# Patient Record
Sex: MALE | Race: WHITE | Employment: UNEMPLOYED | URBAN - METROPOLITAN AREA
[De-identification: names, ages, dates, MRNs, and addresses within clinical notes are randomized per-mention and may not be internally consistent; named-entity substitution may affect disease eponyms.]

---

## 2018-06-04 ENCOUNTER — HOSPITAL ENCOUNTER (INPATIENT)
Age: 58
LOS: 9 days | Discharge: HOME HEALTH | DRG: 640 | End: 2018-06-13
Attending: INTERNAL MEDICINE | Admitting: INTERNAL MEDICINE
Payer: COMMERCIAL

## 2018-06-04 DIAGNOSIS — K70.11 ASCITES DUE TO ALCOHOLIC HEPATITIS: Primary | ICD-10-CM

## 2018-06-04 PROBLEM — E11.10 DKA (DIABETIC KETOACIDOSES): Status: ACTIVE | Noted: 2018-06-04

## 2018-06-04 LAB
ADMINISTERED INITIALS, ADMINIT: NORMAL
D50 ADMINISTERED, D50ADM: 0 ML
D50 ORDER, D50ORD: 0 ML
GLUCOSE, GLC: 337 MG/DL
HIGH TARGET, HITG: 180 MG/DL
INSULIN ADMINSTERED, INSADM: 5.5 UNITS/HOUR
INSULIN ORDER, INSORD: 5.5 UNITS/HOUR
LOW TARGET, LOT: 140 MG/DL
MINUTES UNTIL NEXT BG, NBG: 60 MIN
MULTIPLIER, MUL: 0.02
ORDER INITIALS, ORDINIT: NORMAL

## 2018-06-04 PROCEDURE — 74011250636 HC RX REV CODE- 250/636: Performed by: INTERNAL MEDICINE

## 2018-06-04 PROCEDURE — 74011000258 HC RX REV CODE- 258: Performed by: INTERNAL MEDICINE

## 2018-06-04 PROCEDURE — 74011636637 HC RX REV CODE- 636/637: Performed by: INTERNAL MEDICINE

## 2018-06-04 PROCEDURE — 65610000006 HC RM INTENSIVE CARE

## 2018-06-04 PROCEDURE — 82962 GLUCOSE BLOOD TEST: CPT

## 2018-06-04 RX ORDER — SODIUM CHLORIDE 9 MG/ML
125 INJECTION, SOLUTION INTRAVENOUS CONTINUOUS
Status: DISCONTINUED | OUTPATIENT
Start: 2018-06-04 | End: 2018-06-04

## 2018-06-04 RX ORDER — MAGNESIUM SULFATE 100 %
4 CRYSTALS MISCELLANEOUS AS NEEDED
Status: DISCONTINUED | OUTPATIENT
Start: 2018-06-04 | End: 2018-06-13 | Stop reason: HOSPADM

## 2018-06-04 RX ORDER — DEXTROSE 50 % IN WATER (D50W) INTRAVENOUS SYRINGE
25-50 AS NEEDED
Status: DISCONTINUED | OUTPATIENT
Start: 2018-06-04 | End: 2018-06-13 | Stop reason: HOSPADM

## 2018-06-04 RX ADMIN — SODIUM CHLORIDE 5.5 UNITS/HR: 900 INJECTION, SOLUTION INTRAVENOUS at 23:26

## 2018-06-04 RX ADMIN — SODIUM CHLORIDE 125 ML/HR: 900 INJECTION, SOLUTION INTRAVENOUS at 23:06

## 2018-06-04 NOTE — IP AVS SNAPSHOT
[address] [address] [place] Patient: Devin Davis MRN: [id number] :1960 About your hospitalization You were admitted on:  2018 You last received care in the:  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 Currently on waiting list for PCP in [place]. Discharge Orders None A check nathaniel indicates which time of day the medication should be taken. My Medications START taking these medications Instructions Each Dose to Equal  
 Morning Noon Evening Bedtime  
 folic acid 1 mg tablet Commonly known as:  [employer] Start taking on:  2018 Your last dose was: Your next dose is: Take 1 Tab by mouth daily. 1 mg  
    
   
   
   
  
 glipiZIDE 10 mg tablet Commonly known as:  Alyssa Shown Your last dose was: Your next dose is: Take 1 Tab by mouth Before breakfast and dinner. 10 mg  
    
   
   
   
  
 lactulose 10 gram/15 mL solution Commonly known as:  Checo Ollie Your last dose was: Your next dose is: Take 30 mL by mouth two (2) times a day. Indications: Hepatic Encephalopathy, hold it if he has more than 6 BMs a day 30 mL  
    
   
   
   
  
 levothyroxine 25 mcg tablet Commonly known as:  SYNTHROID Start taking on:  2018 Your last dose was: Your next dose is: Take 1 Tab by mouth Daily (before breakfast). 25 mcg  
    
   
   
   
  
 metFORMIN 500 mg tablet Commonly known as:  GLUCOPHAGE Your last dose was: Your next dose is: Take 1 Tab by mouth two (2) times daily (with meals). 500 mg  
    
   
   
   
  
 midodrine 5 mg tablet Commonly known as:  Candance Passe Your last dose was: Your next dose is: Take 1 Tab by mouth two (2) times daily (with meals) for 30 days. 5 mg  
    
   
   
   
  
 pantoprazole 40 mg tablet Commonly known as:  PROTONIX Your last dose was: Your next dose is: Take 1 Tab by mouth Before breakfast and dinner. 40 mg  
    
   
   
   
  
 rifAXIMin 550 mg tablet Commonly known as:  Jovani Patiño Your last dose was: Your next dose is: Take 1 Tab by mouth two (2) times a day. Indications: Hepatic Encephalopathy 550 mg SITagliptin 50 mg tablet Commonly known as:  Deepti Melena Start taking on:  6/14/2018 Your last dose was: Your next dose is: Take 1 Tab by mouth daily. 50 mg  
    
   
   
   
  
 spironolactone 25 mg tablet Commonly known as:  ALDACTONE Start taking on:  6/14/2018 Your last dose was: Your next dose is: Take 0.5 Tabs by mouth daily. 12.5 mg  
    
   
   
   
  
 therapeutic multivitamin tablet Commonly known as:  [facility] Start taking on:  6/14/2018 Your last dose was: Your next dose is: Take 1 Tab by mouth daily. 1 Tab  
    
   
   
   
  
 thiamine 100 mg tablet Commonly known as:  B-1 Start taking on:  6/14/2018 Your last dose was: Your next dose is: Take 1 Tab by mouth daily. 100 mg  
    
   
   
   
  
 traMADol 50 mg tablet Commonly known as:  ULTRAM  
   
Your last dose was: Your next dose is: Take 1 Tab by mouth two (2) times daily as needed. Max Daily Amount: 100 mg. Indications: Pain 50 mg Where to Get Your Medications Information on where to get these meds will be given to you by the nurse or doctor. ! Ask your nurse or doctor about these medications  
  folic acid 1 mg tablet  
 glipiZIDE 10 mg tablet  
 lactulose 10 gram/15 mL solution  
 levothyroxine 25 mcg tablet  
 metFORMIN 500 mg tablet  
 midodrine 5 mg tablet  
 pantoprazole 40 mg tablet  
 rifAXIMin 550 mg tablet SITagliptin 50 mg tablet  
 spironolactone 25 mg tablet  
 therapeutic multivitamin tablet  
 thiamine 100 mg tablet  
 traMADol 50 mg tablet Opioid Education Prescription Opioids: What You Need to Know: 
 
Prescription opioids can be used to help relieve moderate-to-severe pain and are often prescribed following a surgery or injury, or for certain health conditions. These medications can be an important part of treatment but also come with serious risks. Opioids are strong pain medicines. Examples include hydrocodone, oxycodone, fentanyl, and morphine. Heroin is an example of an illegal opioid. It is important to work with your health care provider to make sure you are getting the safest, most effective care. WHAT ARE THE RISKS AND SIDE EFFECTS OF OPIOID USE? Prescription opioids carry serious risks of addiction and overdose, especially with prolonged use. An opioid overdose, often marked by slow breathing, can cause sudden death. The use of prescription opioids can have a number of side effects as well, even when taken as directed. · Tolerance-meaning you might need to take more of a medication for the same pain relief · Physical dependence-meaning you have symptoms of withdrawal when the medication is stopped. Withdrawal symptoms can include nausea, sweating, chills, diarrhea, stomach cramps, and muscle aches.   Withdrawal can last up to several weeks, depending on which drug you took and how long you took it. · Increased sensitivity to pain · Constipation · Nausea, vomiting, and dry mouth · Sleepiness and dizziness · Confusion · Depression · Low levels of testosterone that can result in lower sex drive, energy, and strength · Itching and sweating RISKS ARE GREATER WITH:      
· History of drug misuse, substance use disorder, or overdose · Mental health conditions (such as depression or anxiety) · Sleep apnea · Older age (72 years or older) · Pregnancy Avoid alcohol while taking prescription opioids. Also, unless specifically advised by your health care provider, medications to avoid include: · Benzodiazepines (such as Xanax or Valium) · Muscle relaxants (such as Soma or Flexeril) · Hypnotics (such as Ambien or Lunesta) · Other prescription opioids KNOW YOUR OPTIONS Talk to your health care provider about ways to manage your pain that don't involve prescription opioids. Some of these options may actually work better and have fewer risks and side effects. Options may include: 
· Pain relievers such as acetaminophen, ibuprofen, and naproxen · Some medications that are also used for depression or seizures · Physical therapy and exercise · Counseling to help patients learn how to cope better with triggers of pain and stress. · Application of heat or cold compress · Massage therapy · Relaxation techniques Be Informed Make sure you know the name of your medication, how much and how often to take it, and its potential risks & side effects. IF YOU ARE PRESCRIBED OPIOIDS FOR PAIN: 
· Never take opioids in greater amounts or more often than prescribed. Remember the goal is not to be pain-free but to manage your pain at a tolerable level. · Follow up with your primary care provider to: · Work together to create a plan on how to manage your pain. · Talk about ways to help manage your pain that don't involve prescription opioids. · Talk about any and all concerns and side effects. · Help prevent misuse and abuse. · Never sell or share prescription opioids · Help prevent misuse and abuse. · Store prescription opioids in a secure place and out of reach of others (this may include visitors, children, friends, and family). · Safely dispose of unused/unwanted prescription opioids: Find your community drug take-back program or your pharmacy mail-back program, or flush them down the toilet, following guidance from the Food and Drug Administration (www.fda.gov/Drugs/ResourcesForYou). · Visit www.cdc.gov/drugoverdose to learn about the risks of opioid abuse and overdose. · If you believe you may be struggling with addiction, tell your health care provider and ask for guidance or call [employer] at 8-563-350-IXPX. Discharge Instructions Diabetic Ketoacidosis (DKA): Care Instructions Your Care Instructions Diabetic ketoacidosis (DKA) happens when the body does not have enough insulin and can't get the sugar it needs for energy. When the body can't use sugar for energy, it starts to use fat for energy. This process makes fatty acids called ketones. The ketones build up in the blood and change the chemical balance in your body. This problem can be very dangerous and needs to be treated. Without treatment, it can lead to a coma or death. DKA occurs most often in people with type 1 diabetes. But people with type 2 diabetes also can get it. DKA can be caused by many things. It can happen if you don't take enough insulin. It can also happen if you have an infection or illness like the flu. Sometimes it happens if you are very dehydrated. DKA can only be treated with insulin and fluids. These are often given in a vein (IV). Follow-up care is a key part of your treatment and safety. Be sure to make and go to all appointments, and call your doctor if you are having problems. It's also a good idea to know your test results and keep a list of the medicines you take. How can you care for yourself at home? To reduce your chance of ketoacidosis: 
Take your insulin and other diabetes medicines on time and in the right dose. If an infection caused your DKA and your doctor prescribed antibiotics, take them as directed. Do not stop taking them just because you feel better. You need to take the full course of antibiotics. Test your blood sugar before meals and at bedtime or as often as your doctor advises. This is the best way to know when your blood sugar is high so you can treat it early. Watching for symptoms is not as helpful. This is because you may not have symptoms until your blood sugar is very high. Or you may not notice them. Teach others at work and at home how to check your blood sugar. Make sure that someone else knows how do it in case you can't. Wear or carry medical identification at all times. This is very important in case you are too sick or injured to speak for yourself. Talk to your doctor about when you can start to exercise again. Eat regular meals that spread your calories and carbohydrate throughout the day. This will help keep your blood sugar steady. When you are sick: 
Take your insulin and diabetes medicines. This is important even if you are vomiting and having trouble eating or drinking. Your blood sugar may go up because you are sick. If you are eating less than normal, you may need to change your dose of insulin. Talk with your doctor about a plan when you are well. Then you will know what to do when you are sick. Drink extra fluids to prevent dehydration. These include water, broth, and sugar-free drinks.  If you don't drink enough, the insulin from your shot may not get into your blood. So your blood sugar may go up. Try to eat as you normally do, with a focus on healthy food choices. Check your blood sugar at least every 3 to 4 hours. Check it more often if it's rising fast. If your doctor has told you to take an extra insulin dose for high blood sugar levels (for example, above 240 mg/dL) be sure to take the right amount. If you're not sure how much to take, call your doctor. Check your temperature and pulse often. If your temperature goes up, call your doctor. You may be getting worse. If you take insulin, check your urine or blood for ketones, especially when you have high blood sugar (for example, above 240 mg/dL). Call your doctor if your ketone level is moderate or high. If you know your blood sugar is high, treat it before it gets worse. If you missed your usual dose of insulin or other diabetes medicine, take the missed dose or take the amount your doctor told you to take if this happens. If you and your doctor decide on a dose of extra-fast-acting insulin, give yourself the right dose. If you take insulin and your doctor has not told you how much fast-acting insulin to take based on your blood sugar level, call your doctor. Drink extra water or sugar-free drinks to prevent dehydration. Wait 30 minutes after you take extra insulin or missed medicines. Then check your blood sugar again. If symptoms of high blood sugar get worse or your blood sugar level keeps rising, call your doctor. If you start to feel sleepy or confused, call 911. When should you call for help? Call 911 anytime you think you may need emergency care. For example, call if: You passed out (lost consciousness). You are confused or cannot think clearly. Your blood sugar is very high or very low. Watch closely for changes in your health, and be sure to contact your doctor if: 
Your blood sugar stays outside the level your doctor set for you. You have any problems. Where can you learn more? Go to http://carissa-thierry.info/. Meghann Loud in the search box to learn more about \"Diabetic Ketoacidosis (DKA): Care Instructions. \" Current as of: March 13, 2017 Content Version: 11.4 © 1064-7708 [email or web address]. Care instructions adapted under license by [employer] (which disclaims liability or warranty for this information). If you have questions about a medical condition or this instruction, always ask your healthcare professional. [address] any warranty or liability for your use of this information. Gastrointestinal Bleeding: Care Instructions Your Care Instructions The digestive or gastrointestinal tract goes from the mouth to the anus. It is often called the GI tract. Bleeding can happen anywhere in the GI tract. It may be caused by an ulcer, an infection, or cancer. It may also be caused by medicines such as aspirin or ibuprofen. Light bleeding may not cause any symptoms at first. But if you continue to bleed for a while, you may feel very weak or tired. Sudden, heavy bleeding means you need to see a doctor right away. This kind of bleeding can be very dangerous. But it can usually be cured or controlled. The doctor may do some tests to find the cause of your bleeding. Follow-up care is a key part of your treatment and safety. Be sure to make and go to all appointments, and call your doctor if you are having problems. It's also a good idea to know your test results and keep a list of the medicines you take. How can you care for yourself at home? · Be safe with medicines. Take your medicines exactly as prescribed. Call your doctor if you think you are having a problem with your medicine. You will get more details on the specific medicines your doctor prescribes.  
· Do not take aspirin or other anti-inflammatory medicines, such as naproxen (Aleve) or ibuprofen (Advil, Motrin), without talking to your doctor first. Ask your doctor if it is okay to use acetaminophen (Tylenol). · Do not drink alcohol. · The bleeding may make you lose iron. So it's important to eat foods that have a lot of iron. These include red meat, shellfish, poultry, and eggs. They also include beans, raisins, whole-grain breads, and leafy green vegetables. If you want help planning meals, you can make an appointment with a dietitian. When should you call for help? Call 911 anytime you think you may need emergency care. For example, call if: 
? · You have sudden, severe belly pain. ? · You vomit blood or what looks like coffee grounds. ? · You passed out (lost consciousness). ? · Your stools are maroon or very bloody. ?Call your doctor now or seek immediate medical care if: 
? · You are dizzy or lightheaded, or you feel like you may faint. ? · Your stools are black and look like tar, or they have streaks of blood. ? · You have belly pain. ? · You vomit or have nausea. ? · You have trouble swallowing, or it hurts when you swallow. ? Watch closely for changes in your health, and be sure to contact your doctor if: 
? · You do not get better as expected. Where can you learn more? Go to http://carissa-thierry.info/. Enter H439 in the search box to learn more about \"Gastrointestinal Bleeding: Care Instructions. \" Current as of: March 20, 2017 Content Version: 11.4 © 3708-0462 [employer]. Care instructions adapted under license by [employer] (which disclaims liability or warranty for this information). If you have questions about a medical condition or this instruction, always ask your healthcare professional. [address] [address] any warranty or liability for your use of this information. MyChart Announcement  We are excited to announce that we are making your provider's discharge notes available to you in [facility]. You will see these notes when they are completed and signed by the physician that discharged you from your recent hospital stay. If you have any questions or concerns about any information you see in [facility], please call the Health Information Department where you were seen or reach out to your Primary Care Provider for more information about your plan of care. Introducing \[facility] [place] [facility]\"" & HEALTH SERVICES! [facility] introduces [facility] patient portal. Now you can access parts of your medical record, email your doctor's office, and request medication refills online. 1. In your internet browser, go to [email or web address] 2. Click on the First Time User? Click Here link in the Sign In box. You will see the New Member Sign Up page. 3. Enter your [facility] Access Code exactly as it appears below. You will not need to use this code after youve completed the sign-up process. If you do not sign up before the expiration date, you must request a new code. · [facility] Access Code: [email or web address] Expires: 9/6/2018 10:08 AM 
 
4. Enter the last four digits of your Social Security Number (xxxx) and Date of Birth (mm/dd/yyyy) as indicated and click Submit. You will be taken to the next sign-up page. 5. Create a [facility] ID. This will be your [facility] login ID and cannot be changed, so think of one that is secure and easy to remember. 6. Create a [facility] password. You can change your password at any time. 7. Enter your Password Reset Question and Answer. This can be used at a later time if you forget your password. 8. Enter your e-mail address. You will receive e-mail notification when new information is available in [address] 9. Click Sign Up. You can now view and download portions of your medical record. 10. Click the Download Summary menu link to download a portable copy of your medical information. If you have questions, please visit the Frequently Asked Questions section of the MyChart website. Remember, [employer] is NOT to be used for urgent needs. For medical emergencies, dial 911. Now available from your iPhone and Android! Introducing Leeroy Kim As a [employer] patient, I wanted to make you aware of our electronic visit tool called Leeroy Kim. [employer] 24/7 allows you to connect within minutes with a medical provider 24 hours a day, seven days a week via a mobile device or tablet or logging into a secure website from your computer. You can access Leeroy Kim from anywhere in the [place]. A virtual visit might be right for you when you have a simple condition and feel like you just dont want to get out of bed, or cant get away from work for an appointment, when your regular [employer] provider is not available (evenings, weekends or holidays), or when youre out of town and need minor care. Electronic visits cost only $49 and if the [employer] 24/7 provider determines a prescription is needed to treat your condition, one can be electronically transmitted to a nearby pharmacy*. Please take a moment to enroll today if you have not already done so. The enrollment process is free and takes just a few minutes. To enroll, please download the [employer] 24/7 hitesh to your tablet or phone, or visit [email or web address] to enroll on your computer. And, as an [address] [place] [address] patient with a [employer] account, the results of your visits will be scanned into your electronic medical record and your primary care provider will be able to view the scanned results. We urge you to continue to see your regular [employer] provider for your ongoing medical care.   And while your primary care provider may not be the one available when you seek a Leeroy Kim virtual visit, the peace of mind you get from getting a real diagnosis real time can be priceless. For more information on Leeroy Shawfin, view our Frequently Asked Questions (FAQs) at [email or web address]. Sincerely, 
 
Nico Whatley MD 
Chief Medical Officer Maria Del Rosario Parsons *:  certain medications cannot be prescribed via Leeroy Alexanderlove Unresulted tests-please follow up with your PCP on these results Procedure/Test Authorizing Provider ALBUMIN, FLUID Hector Torres MD  
 AMMONIA Alban Mendez MD  
 AMMONIA January-Dayan Ogoy V, PA-C  
 CBC W/O DIFF Ronda Wiley MD  
 CBC W/O DIFF Alban Mendez MD  
 CBC WITH AUTOMATED DIFF Alban Mendez MD  
 CBC WITH AUTOMATED DIFF Cedrick Damon MD  
 CBC WITH AUTOMATED DIFF Cedrick Damon MD  
 CELL COUNT AND DIFF, BODY FLUID Joshua Moreno MD  
 CREATININE, UR, Tala Bowens MD  
 CULTURE, BLOOD January-Jill Ogoy V, PA-C  
 CULTURE, BLOOD January-Jill Ogoy V, PA-C  
 DRUG SCREEN, URINE January-Jill Ogoy V, PA-C Laya Velázquez MD  
 GLUCOSE, RANDOM Cedrick Damon MD  
 [facility] Historical Provider  HEMOGLOBIN A1C Yvonne Garcia MD  
 HEPATITIS PANEL, ACUTE January-Jill Ogoy V, PA-C  
 HGB & HCT Alban Mendez MD  
 IRON Erle Harry, MD MAGNESIUM Vincent Booze, MD Dawayne Opitz, MD Dawayne Opitz, MD  
 METABOLIC PANEL, BASIC Marcelino Self MD  
 METABOLIC PANEL, BASIC Marcelino Self MD  
 METABOLIC PANEL, Domenico Zhao MD  
 METABOLIC PANEL, Paris Freeman MD  
 METABOLIC PANEL, Brandie Desai MD  
 METABOLIC PANEL, Brandie Desai MD  
 METABOLIC PANEL, COMPREHENSIVE January-Dayan Ogoy V, PA-C  
 METABOLIC PANEL, Chandrika Melara MD  
 MRSA SCREEN - PCR (NASAL) January-Jill Ogoy V, PA-C  
 OSMOLALITY, SERUM/PLASMA Virgil Guzman, MD  
 OSMOLALITY, UR Virgil Guzman, MD Do Haji, MD Do Haji, MD Do Haji, MD Do Haji, MD Do Haji, MD Do Haji, MD Do Haji, MD Do Haji, MD Do Haji, MD  
 POTASSIUM, UR, Shira Tejada MD  
 PROTEIN TOTAL, FLUID Alfonso Martin MD  
 PROTHROMBIN TIME + INR Eloise Whiteside MD  
 PROTHROMBIN TIME + INR January-Jill Ogoy V, PA-C  
 PROTHROMBIN TIME + INR Jennie Veary, PA-C  
 PROTHROMBIN TIME + INR Mitchell Pack, PA-C  
 PTT January-Jill Ogoy V, PA-C  
 SODIUM, UR, Shira Tejada MD  
 T4, FREE January-Jill Ogoy V, PA-C  
 TSH Guillermina Acosta MD  
  ABD LTD Canda Net, PA-C  
 MD Margaret Solorio MD  
 XR CHEST PA LAT Scott Valenzuela MD  
  
Providers Seen During Your Hospitalization Provider Specialty Primary office phone Cristina Schumacher MD Internal Medicine [phone] Eloise Whiteside MD Infectious Diseases [phone] Rafael Dee MD Internal Medicine [phone] Scott Valenzuela MD Internal Medicine [phone] Your Primary Care Physician (PCP) Primary Care Physician Office Phone Office Fax NONE ** None ** ** None ** You are allergic to the following Allergen Reactions Bee Venom Protein (Honey Bee) Itching Recent Documentation Height Weight BMI  
  
  
 1.803 m 91.7 kg 28.2 kg/m2 Emergency Contacts Name Discharge Info [facility] [3[facility] Sister [23] [phone] Patient Belongings The following personal items are in your possession at time of discharge: 
  Dental Appliances: None  Visual Aid: None      Home Medications: None   Jewelry: None  Clothing: Footwear, Pants, Shirt, Socks, Undergarments    Other Valuables: None  Personal Items Sent to Safe:  (NONE) Please provide this summary of care documentation to your next provider. Signatures-by signing, you are acknowledging that this After Visit Summary has been reviewed with you and you have received a copy. Patient Signature:  ____________________________________________________________ Date:  ____________________________________________________________  
  
[facility] Provider Signature:  ____________________________________________________________ Date:  ____________________________________________________________

## 2018-06-04 NOTE — IP AVS SNAPSHOT
Summary of Care Report The Summary of Care report has been created to help improve care coordination. Users with access to [employer] or [employer] [address] (Web-based application) may access additional patient information including the Discharge Summary. If you are not currently a [facility] user and need more information, please call the number listed below in the [place] [address] section and ask to be connected with Medical Records. Facility Information Name Address Phone [place] [address] [facility] [address] [phone] Patient Information Patient Name Sex  Beverley Old ([id number]) Male 1960 Discharge Information Admitting Provider Service Area Unit Angeli Marquez MD / [address] Telemetry / [phone] Discharge Provider Discharge Date/Time Discharge Disposition Destination (none) 2018 (Pending) [facility] (none) Patient Language Language ENGLISH [13] Hospital Problems as of 2018  Reviewed: 2018  2:43 PM by Lynn Villarreal CRNA Class Noted - Resolved Last Modified POA Active Problems Type 2 diabetes mellitus with hyperosmolarity ([address]  2018 - Present 2018 by Angeli Marquez MD Unknown Entered by Angeli Marquez MD  
  Alcoholic cirrhosis ([address]  2018 - Present 2018 by Angeli Marquez MD Unknown Entered by Angeli Marquez MD  
  Alcohol withdrawal syndrome, with delirium ([address]  2018 - Present 2018 by Angeli Marquez MD Unknown Entered by Angeli Marquez MD  
  * (Principal)Hyponatremia  2018 - Present 2018 by Angeli Marquez MD Unknown Entered by Angeli Marquez MD  
  Nausea  2018 - Present 2018 by Angeli Marquez MD Unknown   Entered by Angeli Marquez MD  
 Other hemochromatosis  6/5/2018 - Present 6/5/2018 by Duc Hernandez MD Unknown Entered by Duc Hernandez MD  
  Weakness generalized  6/5/2018 - Present 6/5/2018 by Duc Hernandez MD Unknown Entered by Duc Hernandez MD  
  Ascites due to alcoholic hepatitis  9/9/3694 - Present 6/5/2018 by Duc Hernandez MD Unknown Entered by Duc Hernandez MD  
  Thrombocytopenia ([address]  6/5/2018 - Present 6/5/2018 by Duc Hernandez MD Unknown Entered by Duc Hernandez MD  
  
Non-Hospital Problems as of 6/13/2018  Reviewed: 6/6/2018  2:43 PM by Royal Daugherty CRNA None You are allergic to the following Allergen Reactions Bee Venom Protein (Honey Bee) Itching Current Discharge Medication List  
  
START taking these medications Dose & Instructions Dispensing Information Comments  
 folic acid 1 mg tablet Commonly known as:  [employer] Start taking on:  6/14/2018 Dose:  1 mg Take 1 Tab by mouth daily. Quantity:  30 Tab Refills:  0  
   
 glipiZIDE 10 mg tablet Commonly known as:  Kade Reap Dose:  10 mg Take 1 Tab by mouth Before breakfast and dinner. Quantity:  60 Tab Refills:  0  
   
 lactulose 10 gram/15 mL solution Commonly known as:  Maryla Mood Dose:  30 mL Take 30 mL by mouth two (2) times a day. Indications: Hepatic Encephalopathy, hold it if he has more than 6 BMs a day Quantity:  2000 mL Refills:  0  
   
 levothyroxine 25 mcg tablet Commonly known as:  SYNTHROID Start taking on:  6/14/2018 Dose:  25 mcg Take 1 Tab by mouth Daily (before breakfast). Quantity:  30 Tab Refills:  0  
   
 metFORMIN 500 mg tablet Commonly known as:  GLUCOPHAGE Dose:  500 mg Take 1 Tab by mouth two (2) times daily (with meals). Quantity:  60 Tab Refills:  0  
   
 midodrine 5 mg tablet Commonly known as:  Adore Gentile Dose:  5 mg Take 1 Tab by mouth two (2) times daily (with meals) for 30 days. Quantity:  60 Tab Refills:  0  
   
 pantoprazole 40 mg tablet Commonly known as:  PROTONIX Dose:  40 mg Take 1 Tab by mouth Before breakfast and dinner. Quantity:  60 Tab Refills:  0  
   
 rifAXIMin 550 mg tablet Commonly known as:  [place] Dose:  550 mg Take 1 Tab by mouth two (2) times a day. Indications: Hepatic Encephalopathy Quantity:  60 Tab Refills:  0 SITagliptin 50 mg tablet Commonly known as:  Noreene Sly Start taking on:  6/14/2018 Dose:  50 mg Take 1 Tab by mouth daily. Quantity:  30 Tab Refills:  0  
   
 spironolactone 25 mg tablet Commonly known as:  ALDACTONE Start taking on:  6/14/2018 Dose:  12.5 mg Take 0.5 Tabs by mouth daily. Quantity:  15 Tab Refills:  0  
   
 therapeutic multivitamin tablet Commonly known as:  [facility] Start taking on:  6/14/2018 Dose:  1 Tab Take 1 Tab by mouth daily. Quantity:  30 Tab Refills:  0  
   
 thiamine 100 mg tablet Commonly known as:  B-1 Start taking on:  6/14/2018 Dose:  100 mg Take 1 Tab by mouth daily. Quantity:  30 Tab Refills:  0  
   
 traMADol 50 mg tablet Commonly known as:  ULTRAM  
 Dose:  50 mg Take 1 Tab by mouth two (2) times daily as needed. Max Daily Amount: 100 mg. Indications: Pain Quantity:  10 Tab Refills:  0 Surgery Information ID Date/Time Status Primary Surgeon All Procedures Location [id number] 6/7/2018 Jaison Zaldivar MD ENDOSCOPY WITH POSSIBLE with gold probe [address] ENDOSCOPY Follow-up Information Follow up With Details Comments Contact Info Urgent Care/PCP  please follow-up at urgent care in 1 week. Currently on waiting list for PCP in [place]. Discharge Instructions Diabetic Ketoacidosis (DKA): Care Instructions Your Care Instructions Diabetic ketoacidosis (DKA) happens when the body does not have enough insulin and can't get the sugar it needs for energy. When the body can't use sugar for energy, it starts to use fat for energy. This process makes fatty acids called ketones. The ketones build up in the blood and change the chemical balance in your body. This problem can be very dangerous and needs to be treated. Without treatment, it can lead to a coma or death. DKA occurs most often in people with type 1 diabetes. But people with type 2 diabetes also can get it. DKA can be caused by many things. It can happen if you don't take enough insulin. It can also happen if you have an infection or illness like the flu. Sometimes it happens if you are very dehydrated. DKA can only be treated with insulin and fluids. These are often given in a vein (IV). Follow-up care is a key part of your treatment and safety. Be sure to make and go to all appointments, and call your doctor if you are having problems. It's also a good idea to know your test results and keep a list of the medicines you take. How can you care for yourself at home? To reduce your chance of ketoacidosis: 
Take your insulin and other diabetes medicines on time and in the right dose. If an infection caused your DKA and your doctor prescribed antibiotics, take them as directed. Do not stop taking them just because you feel better. You need to take the full course of antibiotics. Test your blood sugar before meals and at bedtime or as often as your doctor advises. This is the best way to know when your blood sugar is high so you can treat it early. Watching for symptoms is not as helpful. This is because you may not have symptoms until your blood sugar is very high. Or you may not notice them. Teach others at work and at home how to check your blood sugar. Make sure that someone else knows how do it in case you can't. Wear or carry medical identification at all times. This is very important in case you are too sick or injured to speak for yourself. Talk to your doctor about when you can start to exercise again. Eat regular meals that spread your calories and carbohydrate throughout the day. This will help keep your blood sugar steady. When you are sick: 
Take your insulin and diabetes medicines. This is important even if you are vomiting and having trouble eating or drinking. Your blood sugar may go up because you are sick. If you are eating less than normal, you may need to change your dose of insulin. Talk with your doctor about a plan when you are well. Then you will know what to do when you are sick. Drink extra fluids to prevent dehydration. These include water, broth, and sugar-free drinks. If you don't drink enough, the insulin from your shot may not get into your blood. So your blood sugar may go up. Try to eat as you normally do, with a focus on healthy food choices. Check your blood sugar at least every 3 to 4 hours. Check it more often if it's rising fast. If your doctor has told you to take an extra insulin dose for high blood sugar levels (for example, above 240 mg/dL) be sure to take the right amount. If you're not sure how much to take, call your doctor. Check your temperature and pulse often. If your temperature goes up, call your doctor. You may be getting worse. If you take insulin, check your urine or blood for ketones, especially when you have high blood sugar (for example, above 240 mg/dL). Call your doctor if your ketone level is moderate or high. If you know your blood sugar is high, treat it before it gets worse. If you missed your usual dose of insulin or other diabetes medicine, take the missed dose or take the amount your doctor told you to take if this happens. If you and your doctor decide on a dose of extra-fast-acting insulin, give yourself the right dose. If you take insulin and your doctor has not told you how much fast-acting insulin to take based on your blood sugar level, call your doctor. Drink extra water or sugar-free drinks to prevent dehydration. Wait 30 minutes after you take extra insulin or missed medicines. Then check your blood sugar again. If symptoms of high blood sugar get worse or your blood sugar level keeps rising, call your doctor. If you start to feel sleepy or confused, call 911. When should you call for help? Call 911 anytime you think you may need emergency care. For example, call if: You passed out (lost consciousness). You are confused or cannot think clearly. Your blood sugar is very high or very low. Watch closely for changes in your health, and be sure to contact your doctor if: 
Your blood sugar stays outside the level your doctor set for you. You have any problems. Where can you learn more? Go to http://carissa-thierry.info/. Mandi Cornejo in the search box to learn more about \"Diabetic Ketoacidosis (DKA): Care Instructions. \" Current as of: March 13, 2017 Content Version: 11.4 © 8686-1269 [employer]. Care instructions adapted under license by [employer] (which disclaims liability or warranty for this information). If you have questions about a medical condition or this instruction, always ask your healthcare professional. [place] [address] any warranty or liability for your use of this information. Gastrointestinal Bleeding: Care Instructions Your Care Instructions The digestive or gastrointestinal tract goes from the mouth to the anus. It is often called the GI tract. Bleeding can happen anywhere in the GI tract. It may be caused by an ulcer, an infection, or cancer. It may also be caused by medicines such as aspirin or ibuprofen. Light bleeding may not cause any symptoms at first. But if you continue to bleed for a while, you may feel very weak or tired. Sudden, heavy bleeding means you need to see a doctor right away.  This kind of bleeding can be very dangerous. But it can usually be cured or controlled. The doctor may do some tests to find the cause of your bleeding. Follow-up care is a key part of your treatment and safety. Be sure to make and go to all appointments, and call your doctor if you are having problems. It's also a good idea to know your test results and keep a list of the medicines you take. How can you care for yourself at home? · Be safe with medicines. Take your medicines exactly as prescribed. Call your doctor if you think you are having a problem with your medicine. You will get more details on the specific medicines your doctor prescribes. · Do not take aspirin or other anti-inflammatory medicines, such as naproxen (Aleve) or ibuprofen (Advil, Motrin), without talking to your doctor first. Ask your doctor if it is okay to use acetaminophen (Tylenol). · Do not drink alcohol. · The bleeding may make you lose iron. So it's important to eat foods that have a lot of iron. These include red meat, shellfish, poultry, and eggs. They also include beans, raisins, whole-grain breads, and leafy green vegetables. If you want help planning meals, you can make an appointment with a dietitian. When should you call for help? Call 911 anytime you think you may need emergency care. For example, call if: 
? · You have sudden, severe belly pain. ? · You vomit blood or what looks like coffee grounds. ? · You passed out (lost consciousness). ? · Your stools are maroon or very bloody. ?Call your doctor now or seek immediate medical care if: 
? · You are dizzy or lightheaded, or you feel like you may faint. ? · Your stools are black and look like tar, or they have streaks of blood. ? · You have belly pain. ? · You vomit or have nausea. ? · You have trouble swallowing, or it hurts when you swallow. ? Watch closely for changes in your health, and be sure to contact your doctor if: ? · You do not get better as expected. Where can you learn more? Go to http://carissa-thierry.info/. Enter M671 in the search box to learn more about \"Gastrointestinal Bleeding: Care Instructions. \" Current as of: March 20, 2017 Content Version: 11.4 © 8413-2377 [employer]. Care instructions adapted under license by [employer] (which disclaims liability or warranty for this information). If you have questions about a medical condition or this instruction, always ask your healthcare professional. [place] [address] any warranty or liability for your use of this information. Chart Review Routing History No Routing History on File

## 2018-06-05 ENCOUNTER — APPOINTMENT (OUTPATIENT)
Dept: ULTRASOUND IMAGING | Age: 58
DRG: 640 | End: 2018-06-05
Attending: INTERNAL MEDICINE
Payer: COMMERCIAL

## 2018-06-05 ENCOUNTER — APPOINTMENT (OUTPATIENT)
Dept: ULTRASOUND IMAGING | Age: 58
DRG: 640 | End: 2018-06-05
Attending: PHYSICIAN ASSISTANT
Payer: COMMERCIAL

## 2018-06-05 PROBLEM — K70.30 ALCOHOLIC CIRRHOSIS (HCC): Status: ACTIVE | Noted: 2018-06-05

## 2018-06-05 PROBLEM — R53.1 WEAKNESS GENERALIZED: Status: ACTIVE | Noted: 2018-06-05

## 2018-06-05 PROBLEM — R11.0 NAUSEA: Status: ACTIVE | Noted: 2018-06-05

## 2018-06-05 PROBLEM — E11.00 TYPE 2 DIABETES MELLITUS WITH HYPEROSMOLARITY (HCC): Status: ACTIVE | Noted: 2018-06-05

## 2018-06-05 PROBLEM — K70.11 ASCITES DUE TO ALCOHOLIC HEPATITIS: Status: ACTIVE | Noted: 2018-06-05

## 2018-06-05 PROBLEM — F10.931 ALCOHOL WITHDRAWAL SYNDROME, WITH DELIRIUM (HCC): Status: ACTIVE | Noted: 2018-06-05

## 2018-06-05 PROBLEM — E83.118 OTHER HEMOCHROMATOSIS: Status: ACTIVE | Noted: 2018-06-05

## 2018-06-05 PROBLEM — D69.6 THROMBOCYTOPENIA (HCC): Status: ACTIVE | Noted: 2018-06-05

## 2018-06-05 PROBLEM — E87.1 HYPONATREMIA: Status: ACTIVE | Noted: 2018-06-05

## 2018-06-05 LAB
ALBUMIN FLD-MCNC: <0.6 G/DL
ALBUMIN SERPL-MCNC: 2 G/DL (ref 3.4–5)
ALBUMIN SERPL-MCNC: 2 G/DL (ref 3.4–5)
ALBUMIN/GLOB SERPL: 0.6 {RATIO} (ref 0.8–1.7)
ALBUMIN/GLOB SERPL: 0.6 {RATIO} (ref 0.8–1.7)
ALP SERPL-CCNC: 95 U/L (ref 45–117)
ALP SERPL-CCNC: 95 U/L (ref 45–117)
ALT SERPL-CCNC: 73 U/L (ref 16–61)
ALT SERPL-CCNC: 74 U/L (ref 16–61)
AMPHET UR QL SCN: NEGATIVE
ANION GAP SERPL CALC-SCNC: 15 MMOL/L (ref 3–18)
ANION GAP SERPL CALC-SCNC: 15 MMOL/L (ref 3–18)
APPEARANCE FLD: ABNORMAL
APTT PPP: 43.8 SEC (ref 23–36.4)
AST SERPL-CCNC: 141 U/L (ref 15–37)
AST SERPL-CCNC: 144 U/L (ref 15–37)
BACTERIA SPEC CULT: ABNORMAL
BACTERIA SPEC CULT: ABNORMAL
BARBITURATES UR QL SCN: NEGATIVE
BASOPHILS # BLD: 0 K/UL (ref 0–0.06)
BASOPHILS NFR BLD: 0 % (ref 0–3)
BENZODIAZ UR QL: NEGATIVE
BILIRUB SERPL-MCNC: 7.6 MG/DL (ref 0.2–1)
BILIRUB SERPL-MCNC: 7.6 MG/DL (ref 0.2–1)
BUN SERPL-MCNC: 27 MG/DL (ref 7–18)
BUN SERPL-MCNC: 29 MG/DL (ref 7–18)
BUN/CREAT SERPL: 21 (ref 12–20)
BUN/CREAT SERPL: 24 (ref 12–20)
CALCIUM SERPL-MCNC: 7.9 MG/DL (ref 8.5–10.1)
CALCIUM SERPL-MCNC: 8 MG/DL (ref 8.5–10.1)
CANNABINOIDS UR QL SCN: NEGATIVE
CHLORIDE SERPL-SCNC: 80 MMOL/L (ref 100–108)
CHLORIDE SERPL-SCNC: 81 MMOL/L (ref 100–108)
CO2 SERPL-SCNC: 25 MMOL/L (ref 21–32)
CO2 SERPL-SCNC: 26 MMOL/L (ref 21–32)
COCAINE UR QL SCN: NEGATIVE
COLOR FLD: YELLOW
CREAT SERPL-MCNC: 1.19 MG/DL (ref 0.6–1.3)
CREAT SERPL-MCNC: 1.3 MG/DL (ref 0.6–1.3)
CREAT UR-MCNC: 61.3 MG/DL (ref 30–125)
DIFFERENTIAL METHOD BLD: ABNORMAL
EOSINOPHIL # BLD: 0 K/UL (ref 0–0.4)
EOSINOPHIL NFR BLD: 0 % (ref 0–5)
EOSINOPHIL NFR FLD MANUAL: 0 %
ERYTHROCYTE [DISTWIDTH] IN BLOOD BY AUTOMATED COUNT: 16 % (ref 11.6–14.5)
EST. AVERAGE GLUCOSE BLD GHB EST-MCNC: 160 MG/DL
FERRITIN SERPL-MCNC: 5677 NG/ML (ref 8–388)
GLOBULIN SER CALC-MCNC: 3.3 G/DL (ref 2–4)
GLOBULIN SER CALC-MCNC: 3.6 G/DL (ref 2–4)
GLUCOSE BLD STRIP.AUTO-MCNC: 199 MG/DL (ref 70–110)
GLUCOSE BLD STRIP.AUTO-MCNC: 268 MG/DL (ref 70–110)
GLUCOSE BLD STRIP.AUTO-MCNC: 268 MG/DL (ref 70–110)
GLUCOSE BLD STRIP.AUTO-MCNC: 314 MG/DL (ref 70–110)
GLUCOSE BLD STRIP.AUTO-MCNC: 319 MG/DL (ref 70–110)
GLUCOSE BLD STRIP.AUTO-MCNC: 337 MG/DL (ref 70–110)
GLUCOSE BLD STRIP.AUTO-MCNC: 358 MG/DL (ref 70–110)
GLUCOSE SERPL-MCNC: 250 MG/DL (ref 74–99)
GLUCOSE SERPL-MCNC: 282 MG/DL (ref 74–99)
HAV IGM SER QL: NEGATIVE
HBA1C MFR BLD: 7.2 % (ref 4.2–5.6)
HBV CORE IGM SER QL: NEGATIVE
HBV SURFACE AG SER QL: 0.24 INDEX
HBV SURFACE AG SER QL: NEGATIVE
HCT VFR BLD AUTO: 23.9 % (ref 36–48)
HCV AB SER IA-ACNC: 0.14 INDEX
HCV AB SERPL QL IA: NEGATIVE
HCV COMMENT,HCGAC: NORMAL
HDSCOM,HDSCOM: NORMAL
HGB BLD-MCNC: 8.9 G/DL (ref 13–16)
INR PPP: 3.5 (ref 0.8–1.2)
IRON SATN MFR SERPL: 57 %
IRON SERPL-MCNC: 73 UG/DL (ref 50–175)
LYMPHOCYTES # BLD: 1.4 K/UL (ref 0.8–3.5)
LYMPHOCYTES NFR BLD: 15 % (ref 20–51)
LYMPHOCYTES NFR FLD: 8 %
MACROPHAGES NFR FLD: 73 %
MAGNESIUM SERPL-MCNC: 2.1 MG/DL (ref 1.6–2.6)
MCH RBC QN AUTO: 35.9 PG (ref 24–34)
MCHC RBC AUTO-ENTMCNC: 37.2 G/DL (ref 31–37)
MCV RBC AUTO: 96.4 FL (ref 74–97)
METHADONE UR QL: NEGATIVE
MONOCYTES # BLD: 0.8 K/UL (ref 0–1)
MONOCYTES NFR BLD: 9 % (ref 2–9)
MONOCYTES NFR FLD: 8 %
MYELOCYTES NFR BLD MANUAL: 1 %
NEUTROPHILS NFR FLD: 11 %
NEUTS BAND # FLD: 0 %
NEUTS BAND NFR BLD MANUAL: 10 % (ref 0–5)
NEUTS SEG # BLD: 7 K/UL (ref 1.8–8)
NEUTS SEG NFR BLD: 65 % (ref 42–75)
NRBC BLD-RTO: 2 PER 100 WBC
NUC CELL # FLD: 151 /CU MM
OPIATES UR QL: NEGATIVE
PCP UR QL: NEGATIVE
PHOSPHATE SERPL-MCNC: 0.6 MG/DL (ref 2.5–4.9)
PLATELET # BLD AUTO: 52 K/UL (ref 135–420)
PLATELET COMMENTS,PCOM: ABNORMAL
PMV BLD AUTO: 11.3 FL (ref 9.2–11.8)
POTASSIUM SERPL-SCNC: 4 MMOL/L (ref 3.5–5.5)
POTASSIUM SERPL-SCNC: 4 MMOL/L (ref 3.5–5.5)
POTASSIUM UR-SCNC: 14 MMOL/L (ref 12–62)
PROT FLD-MCNC: 0.8 G/DL
PROT SERPL-MCNC: 5.3 G/DL (ref 6.4–8.2)
PROT SERPL-MCNC: 5.6 G/DL (ref 6.4–8.2)
PROTHROMBIN TIME: 33.9 SEC (ref 11.5–15.2)
RBC # BLD AUTO: 2.48 M/UL (ref 4.7–5.5)
RBC # FLD: 3366 /CU MM
RBC MORPH BLD: ABNORMAL
SERVICE CMNT-IMP: ABNORMAL
SODIUM SERPL-SCNC: 121 MMOL/L (ref 136–145)
SODIUM SERPL-SCNC: 121 MMOL/L (ref 136–145)
SODIUM UR-SCNC: 6 MMOL/L (ref 20–110)
SP1: NORMAL
SP2: NORMAL
SP3: NORMAL
SPECIMEN SOURCE FLD: ABNORMAL
SPECIMEN SOURCE FLD: NORMAL
SPECIMEN SOURCE FLD: NORMAL
TIBC SERPL-MCNC: 128 UG/DL (ref 250–450)
WBC # BLD AUTO: 9.3 K/UL (ref 4.6–13.2)
WBC MORPH BLD: ABNORMAL

## 2018-06-05 PROCEDURE — 89051 BODY FLUID CELL COUNT: CPT | Performed by: INTERNAL MEDICINE

## 2018-06-05 PROCEDURE — 80307 DRUG TEST PRSMV CHEM ANLYZR: CPT | Performed by: PHYSICIAN ASSISTANT

## 2018-06-05 PROCEDURE — 65610000006 HC RM INTENSIVE CARE

## 2018-06-05 PROCEDURE — 49083 ABD PARACENTESIS W/IMAGING: CPT

## 2018-06-05 PROCEDURE — 74011636637 HC RX REV CODE- 636/637: Performed by: INTERNAL MEDICINE

## 2018-06-05 PROCEDURE — 84100 ASSAY OF PHOSPHORUS: CPT | Performed by: PHYSICIAN ASSISTANT

## 2018-06-05 PROCEDURE — 77030020186 HC BOOT HL PROTCT SAGE -B

## 2018-06-05 PROCEDURE — 83935 ASSAY OF URINE OSMOLALITY: CPT | Performed by: INTERNAL MEDICINE

## 2018-06-05 PROCEDURE — 87641 MR-STAPH DNA AMP PROBE: CPT | Performed by: PHYSICIAN ASSISTANT

## 2018-06-05 PROCEDURE — 76705 ECHO EXAM OF ABDOMEN: CPT

## 2018-06-05 PROCEDURE — 80053 COMPREHEN METABOLIC PANEL: CPT | Performed by: INTERNAL MEDICINE

## 2018-06-05 PROCEDURE — 84300 ASSAY OF URINE SODIUM: CPT | Performed by: INTERNAL MEDICINE

## 2018-06-05 PROCEDURE — 85610 PROTHROMBIN TIME: CPT | Performed by: PHYSICIAN ASSISTANT

## 2018-06-05 PROCEDURE — 84133 ASSAY OF URINE POTASSIUM: CPT | Performed by: INTERNAL MEDICINE

## 2018-06-05 PROCEDURE — 74011000250 HC RX REV CODE- 250: Performed by: PHYSICIAN ASSISTANT

## 2018-06-05 PROCEDURE — 74011636637 HC RX REV CODE- 636/637: Performed by: PHYSICIAN ASSISTANT

## 2018-06-05 PROCEDURE — P9047 ALBUMIN (HUMAN), 25%, 50ML: HCPCS | Performed by: PHYSICIAN ASSISTANT

## 2018-06-05 PROCEDURE — 83540 ASSAY OF IRON: CPT | Performed by: PHYSICIAN ASSISTANT

## 2018-06-05 PROCEDURE — 74011250637 HC RX REV CODE- 250/637: Performed by: PHYSICIAN ASSISTANT

## 2018-06-05 PROCEDURE — 80074 ACUTE HEPATITIS PANEL: CPT | Performed by: PHYSICIAN ASSISTANT

## 2018-06-05 PROCEDURE — 83036 HEMOGLOBIN GLYCOSYLATED A1C: CPT | Performed by: INTERNAL MEDICINE

## 2018-06-05 PROCEDURE — 74011250637 HC RX REV CODE- 250/637: Performed by: NURSE PRACTITIONER

## 2018-06-05 PROCEDURE — 82570 ASSAY OF URINE CREATININE: CPT | Performed by: INTERNAL MEDICINE

## 2018-06-05 PROCEDURE — 74011250636 HC RX REV CODE- 250/636: Performed by: INTERNAL MEDICINE

## 2018-06-05 PROCEDURE — 82042 OTHER SOURCE ALBUMIN QUAN EA: CPT | Performed by: INTERNAL MEDICINE

## 2018-06-05 PROCEDURE — 82728 ASSAY OF FERRITIN: CPT | Performed by: PHYSICIAN ASSISTANT

## 2018-06-05 PROCEDURE — 85025 COMPLETE CBC W/AUTO DIFF WBC: CPT | Performed by: PHYSICIAN ASSISTANT

## 2018-06-05 PROCEDURE — 85730 THROMBOPLASTIN TIME PARTIAL: CPT | Performed by: PHYSICIAN ASSISTANT

## 2018-06-05 PROCEDURE — 74011250636 HC RX REV CODE- 250/636: Performed by: PHYSICIAN ASSISTANT

## 2018-06-05 PROCEDURE — 74011000258 HC RX REV CODE- 258: Performed by: INTERNAL MEDICINE

## 2018-06-05 PROCEDURE — 80053 COMPREHEN METABOLIC PANEL: CPT | Performed by: PHYSICIAN ASSISTANT

## 2018-06-05 PROCEDURE — 87040 BLOOD CULTURE FOR BACTERIA: CPT | Performed by: PHYSICIAN ASSISTANT

## 2018-06-05 PROCEDURE — 83735 ASSAY OF MAGNESIUM: CPT | Performed by: PHYSICIAN ASSISTANT

## 2018-06-05 PROCEDURE — 84157 ASSAY OF PROTEIN OTHER: CPT | Performed by: INTERNAL MEDICINE

## 2018-06-05 PROCEDURE — 36415 COLL VENOUS BLD VENIPUNCTURE: CPT | Performed by: PHYSICIAN ASSISTANT

## 2018-06-05 PROCEDURE — 36591 DRAW BLOOD OFF VENOUS DEVICE: CPT

## 2018-06-05 PROCEDURE — 74011000258 HC RX REV CODE- 258: Performed by: PHYSICIAN ASSISTANT

## 2018-06-05 PROCEDURE — 77030037878 HC DRSG MEPILEX >48IN BORD MOLN -B

## 2018-06-05 RX ORDER — INSULIN GLARGINE 100 [IU]/ML
10 INJECTION, SOLUTION SUBCUTANEOUS
Status: DISCONTINUED | OUTPATIENT
Start: 2018-06-05 | End: 2018-06-06

## 2018-06-05 RX ORDER — SODIUM CHLORIDE 0.9 % (FLUSH) 0.9 %
5-10 SYRINGE (ML) INJECTION AS NEEDED
Status: DISCONTINUED | OUTPATIENT
Start: 2018-06-05 | End: 2018-06-13 | Stop reason: HOSPADM

## 2018-06-05 RX ORDER — LORAZEPAM 1 MG/1
2 TABLET ORAL
Status: DISCONTINUED | OUTPATIENT
Start: 2018-06-05 | End: 2018-06-11

## 2018-06-05 RX ORDER — ONDANSETRON 2 MG/ML
4 INJECTION INTRAMUSCULAR; INTRAVENOUS
Status: DISCONTINUED | OUTPATIENT
Start: 2018-06-05 | End: 2018-06-13 | Stop reason: HOSPADM

## 2018-06-05 RX ORDER — LORAZEPAM 1 MG/1
1 TABLET ORAL
Status: DISCONTINUED | OUTPATIENT
Start: 2018-06-05 | End: 2018-06-11

## 2018-06-05 RX ORDER — FAMOTIDINE 20 MG/1
20 TABLET, FILM COATED ORAL 2 TIMES DAILY
Status: DISCONTINUED | OUTPATIENT
Start: 2018-06-05 | End: 2018-06-06

## 2018-06-05 RX ORDER — LORAZEPAM 2 MG/ML
3 INJECTION INTRAMUSCULAR
Status: DISCONTINUED | OUTPATIENT
Start: 2018-06-05 | End: 2018-06-11

## 2018-06-05 RX ORDER — INSULIN LISPRO 100 [IU]/ML
INJECTION, SOLUTION INTRAVENOUS; SUBCUTANEOUS EVERY 6 HOURS
Status: DISCONTINUED | OUTPATIENT
Start: 2018-06-05 | End: 2018-06-08

## 2018-06-05 RX ORDER — LORAZEPAM 2 MG/ML
2 INJECTION INTRAMUSCULAR
Status: DISCONTINUED | OUTPATIENT
Start: 2018-06-05 | End: 2018-06-11

## 2018-06-05 RX ORDER — FOLIC ACID 5 MG/ML
1 INJECTION, SOLUTION INTRAMUSCULAR; INTRAVENOUS; SUBCUTANEOUS DAILY
Status: DISCONTINUED | OUTPATIENT
Start: 2018-06-05 | End: 2018-06-07

## 2018-06-05 RX ORDER — SODIUM CHLORIDE 0.9 % (FLUSH) 0.9 %
5-10 SYRINGE (ML) INJECTION EVERY 8 HOURS
Status: DISCONTINUED | OUTPATIENT
Start: 2018-06-05 | End: 2018-06-13 | Stop reason: HOSPADM

## 2018-06-05 RX ORDER — ALBUMIN HUMAN 250 G/1000ML
12.5 SOLUTION INTRAVENOUS EVERY 6 HOURS
Status: COMPLETED | OUTPATIENT
Start: 2018-06-05 | End: 2018-06-07

## 2018-06-05 RX ORDER — LORAZEPAM 2 MG/ML
1 INJECTION INTRAMUSCULAR
Status: DISCONTINUED | OUTPATIENT
Start: 2018-06-05 | End: 2018-06-11

## 2018-06-05 RX ADMIN — Medication 10 ML: at 22:14

## 2018-06-05 RX ADMIN — LORAZEPAM 1 MG: 2 INJECTION INTRAMUSCULAR; INTRAVENOUS at 04:32

## 2018-06-05 RX ADMIN — INSULIN LISPRO 6 UNITS: 100 INJECTION, SOLUTION INTRAVENOUS; SUBCUTANEOUS at 02:00

## 2018-06-05 RX ADMIN — LACTULOSE 20 G: 20 SOLUTION ORAL at 17:03

## 2018-06-05 RX ADMIN — FAMOTIDINE 20 MG: 20 TABLET ORAL at 17:03

## 2018-06-05 RX ADMIN — INSULIN LISPRO 9 UNITS: 100 INJECTION, SOLUTION INTRAVENOUS; SUBCUTANEOUS at 13:30

## 2018-06-05 RX ADMIN — ALBUMIN (HUMAN) 12.5 G: 0.25 INJECTION, SOLUTION INTRAVENOUS at 09:32

## 2018-06-05 RX ADMIN — ALBUMIN (HUMAN) 12.5 G: 0.25 INJECTION, SOLUTION INTRAVENOUS at 17:02

## 2018-06-05 RX ADMIN — INSULIN LISPRO 3 UNITS: 100 INJECTION, SOLUTION INTRAVENOUS; SUBCUTANEOUS at 17:04

## 2018-06-05 RX ADMIN — INSULIN LISPRO 10 UNITS: 100 INJECTION, SOLUTION INTRAVENOUS; SUBCUTANEOUS at 06:32

## 2018-06-05 RX ADMIN — THIAMINE HYDROCHLORIDE 400 MG: 100 INJECTION, SOLUTION INTRAMUSCULAR; INTRAVENOUS at 22:14

## 2018-06-05 RX ADMIN — INSULIN GLARGINE 10 UNITS: 100 INJECTION, SOLUTION SUBCUTANEOUS at 22:12

## 2018-06-05 RX ADMIN — FOLIC ACID 1 MG: 5 INJECTION, SOLUTION INTRAMUSCULAR; INTRAVENOUS; SUBCUTANEOUS at 09:19

## 2018-06-05 RX ADMIN — THIAMINE HYDROCHLORIDE 400 MG: 100 INJECTION, SOLUTION INTRAMUSCULAR; INTRAVENOUS at 16:58

## 2018-06-05 RX ADMIN — RIFAXIMIN 550 MG: 550 TABLET ORAL at 17:03

## 2018-06-05 RX ADMIN — RIFAXIMIN 550 MG: 550 TABLET ORAL at 09:44

## 2018-06-05 RX ADMIN — Medication 10 ML: at 15:22

## 2018-06-05 RX ADMIN — SODIUM PHOSPHATE, MONOBASIC, MONOHYDRATE AND SODIUM PHOSPHATE, DIBASIC ANHYDROUS: 276; 142 INJECTION, SOLUTION INTRAVENOUS at 05:01

## 2018-06-05 RX ADMIN — LACTULOSE 20 G: 20 SOLUTION ORAL at 09:44

## 2018-06-05 RX ADMIN — FAMOTIDINE 20 MG: 20 TABLET ORAL at 15:20

## 2018-06-05 RX ADMIN — PHYTONADIONE 10 MG: 10 INJECTION, EMULSION INTRAMUSCULAR; INTRAVENOUS; SUBCUTANEOUS at 09:12

## 2018-06-05 RX ADMIN — THIAMINE HYDROCHLORIDE 400 MG: 100 INJECTION, SOLUTION INTRAMUSCULAR; INTRAVENOUS at 09:48

## 2018-06-05 RX ADMIN — ALBUMIN (HUMAN) 12.5 G: 0.25 INJECTION, SOLUTION INTRAVENOUS at 13:36

## 2018-06-05 RX ADMIN — Medication 10 ML: at 05:01

## 2018-06-05 RX ADMIN — Medication 10 ML: at 06:36

## 2018-06-05 RX ADMIN — INSULIN GLARGINE 10 UNITS: 100 INJECTION, SOLUTION SUBCUTANEOUS at 04:59

## 2018-06-05 NOTE — DIABETES MGMT
GLYCEMIC CONTROL PLAN OF CARE   Assessment/Recommendations:  Pt discussed in interdisciplinary rounds. Pt is a 62year old male with a past medical history significant for alcohol abuse, liver cirrhosis, and seizures. No history of diabetes noted. Blood glucose elevated upon admission and pt was briefly on insulin. Currently has order for 10 units of Lantus insulin nightly and correctional Lispro (advacned to very resistant scale). Monitor need to increase Lantus. Most recent blood glucose values:  6/5/2018 00:24 6/5/2018 06:08 6/5/2018 07:53 6/5/2018 11:48   268 (H) 358 (H) 314 (H) 268 (H)     Current A1C of 7.2% is equivalent to average blood glucose of 160 mg/dl over the past 2-3 months. Current hospital diabetes medications:   Lantus insulin 10 units every bedtime   Correctional Lispro insulin every 6 hours (very insulin resistant)    Home diabetes medications:  None noted    Diet:  NPO    Education:  Not appropriate at this time, pt drowsy with periods of confusion. Will follow up for education as pt becomes more alert and oriented.        Kyle Aguilar, [address] [place] [address], [address] [facility]

## 2018-06-05 NOTE — CONSULTS
[facility]  Pulmonary, Critical Care, and Sleep Medicine      Name: Katherine Sen MRN: [id number]   : 1960 Hospital: [address] [place] [address]   Date: 2018          Critical Care Initial Patient Consult    Requesting MD: Darlene [facility]  Reason for CC Consult: hyperglycemia     IMPRESSION:   · Worsening fatigue and nausea in setting of hx ETOH abuse, hemochromatosis, liver dysfunction, ascites  · Hyperglycemia, no known hx of DM, Hgb A1c pending  · Mildly elevated tropnin  · Elevated bilirubin/ jaundice, thrombocytopenia, coagulopathy in setting of ETOH cirrhosis/ hemochromatosis history   · Anemia, no active gross bleeding  · Hypoalbuminemia, anasarca, ascites, b/l LE edema   · Chronic hyponatremia  · Hx osteomyelitis, right 3rd toe      RECOMMENDATIONS:   · Resp - stable on room air. May have oxygen supplementation as needed, titrate for SpO2 goal at least 90%  · ID - obtain blood cultures. Trend temperature curve, WBC/ bands. Hold off on antibiotics unless signs and symptoms for infectious process are observed  · CVS - hemodynamically stable although BP marginal with systolic in high 21H to low 100s - unknown baseline. Consider echo should hemodynamics become a concern  · Heme/Onc- obtain iron profile, Vit B12, folate, PT/PTT. Trend H/H and platelets and transfuse as needed. Monitor for signs of active bleeding  · Metabolic - trend electrolytes - replace Phos IV  · Renal - no huerta - monitor renal indices closely. Agree with fluid restriction  · Endocrine - frequent glycemic checks. Agree with d/c insulin infusion - avoid hypoglycemia   · Neuro/ Pain/ Sedation -monitor for ETOH withdrawal using CIWA protocol - caution with ativan. Thiamine, folate supplementation   · GI - diet per primary. Strict aspiration precautions. Trend LFTs, bilirubin; obtain RUQ ultrasound. Recommend GI consult - to follow hyperbilirubinemia  · Prophylaxis - DVT, GI     Subjective/History:      This patient has been seen and evaluated at the request of [facility] for hyperglycemia on insulin infusion. Patient is a 62 y.o. male with medical hx significant for hemochromatosis, left toe osteomyelitis, platelet disorder, presented to [facility] urgent care for worsening fatigue x 1 week with associated insomnia, nausea and \"abdominal bloating\". Pt states he noticed b/l worsening lower extremity edema x 2 weeks. Pt denies chest pain, abdominal pain, cough, sob, vomiting, h/a, dizziness, lightheadedness. At the urgent care clinic, initial w/u significant for hyperglycemia with glucose > 350, elevated troponin from 0.13 to 0.44, low Na 120, Cl 78, Crea 1.38, T meir 7.7, , ALT 88. Pt was started on insulin gtt and transferred to [facility] ICU. Pt states he used to have scheduled phlebotomy monthly for hemochromatosis however reportedly has not been in the clinic for about 2 years. Pt admits to alcohol consumption however amount consumed inconsistent (daily to intermittent; drinks beer and liquor). Pt states he quit smoking some 20 years ago. Denies recreational drug use. Past Medical History:   Diagnosis Date    Alcohol abuse     Ascites     Hemochromatosis     Liver cirrhosis (HCC)     Seizures (HCC)     Thrombocytopenia (HCC)       No past surgical history on file.    Prior to Admission medications    Not on File     Current Facility-Administered Medications   Medication Dose Route Frequency    sodium chloride (NS) flush 5-10 mL  5-10 mL IntraVENous Q8H    thiamine (B-1) 400 mg in 0.9% sodium chloride 50 mL IVPB  400 mg IntraVENous TID    [START ON 6/8/2018] thiamine (B-1) 200 mg in 0.9% sodium chloride 50 mL IVPB  200 mg IntraVENous DAILY    folic acid (FOLVITE) 5 mg/mL injection 1 mg  1 mg IntraVENous DAILY    insulin lispro (HUMALOG) injection   SubCUTAneous Q6H    sodium chloride (NS) flush 5-10 mL  5-10 mL IntraVENous Q8H    insulin glargine (LANTUS) injection 10 Units  10 Units SubCUTAneous QHS     Allergies   Allergen Reactions    Bee Venom Protein (Honey Bee) Itching      Social History   Substance Use Topics    Smoking status: Not on file    Smokeless tobacco: Not on file    Alcohol use Not on file      No family history on file. Review of Systems:  A comprehensive review of systems was negative except for that written in the HPI. Objective:   Vital Signs:    Visit Vitals    BP 96/55    Pulse (!) 119    Temp 98.5 °F (36.9 °C)    Resp 27    Ht 5' 11\" (1.803 m)    Wt 86 kg (189 lb 9.5 oz)    SpO2 96%    BMI 26.44 kg/m2               Temp (24hrs), Av.5 °F (36.9 °C), Min:98.5 °F (36.9 °C), Max:98.5 °F (36.9 °C)       Intake/Output:   Last shift:       1901 -  0700  In: 0   Out: 300 [Urine:300]  Last 3 shifts:      Intake/Output Summary (Last 24 hours) at 18 0232  Last data filed at 18 0001   Gross per 24 hour   Intake                0 ml   Output              300 ml   Net             -300 ml       Physical Exam:    General:  Alert, cooperative, no distress, jaundiced; appears older than stated age. Head:  Normocephalic, without obvious abnormality, atraumatic. Eyes:  Pink palpebral conjunctivae, + icteric sclerae; EOMs intact. Nose: Nares normal. No drainage or sinus tenderness. Throat: Lips, mucosa, and tongue mildly dry   Neck: Supple, symmetrical, trachea midline, no adenopathy, thyroid: no enlargment/tenderness/nodules, no carotid bruit and no JVD. Back:   Symmetric, no curvature. Lungs:   Clear to auscultation bilaterally. Chest wall:  No tenderness or deformity. Heart:  Regular rate and rhythm, S1, S2 present   Abdomen:   Soft, distended, non-tender. Bowel sounds normal.    Extremities: Extremities atraumatic, no cyanosis; + b/l pitting LE edema. Pulses: 2+ and symmetric all extremities.    Skin: + multiple petechiae on torso, UE and LE extremities; multiple bruising to b/l UE; + jaundice; + onychomycosis of all fingernails; + amputation to R 3rd toe with sutures in place    Lymph nodes: Cervical, supraclavicular nodes normal.   Neurologic: Grossly nonfocal       Data:     Recent Results (from the past 24 hour(s))   GLUCOSTABILIZER    Collection Time: 06/04/18 10:48 PM   Result Value Ref Range    Glucose 337 mg/dL    Insulin order 5.5 units/hour    Insulin adminstered 5.5 units/hour    Multiplier 0.020     Low target 140 mg/dL    High target 180 mg/dL    D50 order 0.0 ml    D50 administered 0.00 ml    Minutes until next BG 60 min    Order initials lb     Administered initials lb    GLUCOSE, POC    Collection Time: 06/05/18 12:24 AM   Result Value Ref Range    Glucose (POC) 268 (H) 70 - 110 mg/dL   CBC WITH AUTOMATED DIFF    Collection Time: 06/05/18  1:30 AM   Result Value Ref Range    WBC 9.3 4.6 - 13.2 K/uL    RBC 2.48 (L) 4.70 - 5.50 M/uL    HGB 8.9 (L) 13.0 - 16.0 g/dL    HCT 23.9 (L) 36.0 - 48.0 %    MCV 96.4 74.0 - 97.0 FL    MCH 35.9 (H) 24.0 - 34.0 PG    MCHC 37.2 (H) 31.0 - 37.0 g/dL    RDW 16.0 (H) 11.6 - 14.5 %    PLATELET 52 (L) 446 - 420 K/uL    MPV 11.3 9.2 - 11.8 FL    NEUTROPHILS PENDING %    LYMPHOCYTES PENDING %    MONOCYTES PENDING %    EOSINOPHILS PENDING %    BASOPHILS PENDING %    ABS. NEUTROPHILS PENDING K/UL    ABS. LYMPHOCYTES PENDING K/UL    ABS. MONOCYTES PENDING K/UL    ABS. EOSINOPHILS PENDING K/UL    ABS.  BASOPHILS PENDING K/UL    DF PENDING    PHOSPHORUS    Collection Time: 06/05/18  1:30 AM   Result Value Ref Range    Phosphorus 0.6 (L) 2.5 - 4.9 MG/DL   METABOLIC PANEL, COMPREHENSIVE    Collection Time: 06/05/18  1:30 AM   Result Value Ref Range    Sodium 121 (L) 136 - 145 mmol/L    Potassium 4.0 3.5 - 5.5 mmol/L    Chloride 81 (L) 100 - 108 mmol/L    CO2 25 21 - 32 mmol/L    Anion gap 15 3.0 - 18 mmol/L    Glucose 250 (H) 74 - 99 mg/dL    BUN 27 (H) 7.0 - 18 MG/DL    Creatinine 1.30 0.6 - 1.3 MG/DL    BUN/Creatinine ratio 21 (H) 12 - 20      GFR est AA >60 >60 ml/min/1.73m2    GFR est non-AA 57 (L) >60 ml/min/1.73m2    Calcium 8.0 (L) 8.5 - 10.1 MG/DL    Bilirubin, total PENDING MG/DL    ALT (SGPT) PENDING U/L    AST (SGOT) 141 (H) 15 - 37 U/L    Alk.  phosphatase PENDING U/L    Protein, total PENDING g/dL    Albumin 2.0 (L) 3.4 - 5.0 g/dL    Globulin PENDING g/dL    A-G Ratio PENDING               Telemetry:normal sinus rhythm    Imaging:  CXR Results  (Last 48 hours)    None          CT Results  (Last 48 hours)    None                  January-Dayan BEATTY PA-C

## 2018-06-05 NOTE — CONSULTS
Consult Note  Consult requested by:dr Brittany Grider is a 62 y.o. male [facility] who is being seen on consult for hyponatremia  No chief complaint on file. Admission diagnosis: Hyponatremia     HPI: 62 y o white male with hx of alcoholic cirrhosis,hemochromatosis,transfer yesterday to [facility]. asked to evaluate for hyponatremia. no old labs available. pt can,t give any hx  Past Medical History:   Diagnosis Date    Alcohol abuse     Ascites     Hemochromatosis     Liver cirrhosis (HCC)     Seizures (HCC)     Thrombocytopenia (HCC)       No past surgical history on file. Social History     Social History    Marital status: SINGLE     Spouse name: N/A    Number of children: N/A    Years of education: N/A     Occupational History    Not on file. Social History Main Topics    Smoking status: Not on file    Smokeless tobacco: Not on file    Alcohol use Not on file    Drug use: Not on file    Sexual activity: Not on file     Other Topics Concern    Not on file     Social History Narrative    No narrative on file       No family history on file.   Allergies   Allergen Reactions    Bee Venom Protein (Honey Bee) Itching        Home Medications:     None       Current Facility-Administered Medications   Medication Dose Route Frequency    sodium chloride (NS) flush 5-10 mL  5-10 mL IntraVENous Q8H    sodium chloride (NS) flush 5-10 mL  5-10 mL IntraVENous PRN    LORazepam (ATIVAN) tablet 1 mg  1 mg Oral Q1H PRN    Or    LORazepam (ATIVAN) injection 1 mg  1 mg IntraVENous Q1H PRN    LORazepam (ATIVAN) tablet 2 mg  2 mg Oral Q1H PRN    Or    LORazepam (ATIVAN) injection 2 mg  2 mg IntraVENous Q1H PRN    LORazepam (ATIVAN) injection 3 mg  3 mg IntraVENous Q15MIN PRN    thiamine (B-1) 400 mg in 0.9% sodium chloride 50 mL IVPB  400 mg IntraVENous TID    [START ON 6/8/2018] thiamine (B-1) 200 mg in 0.9% sodium chloride 50 mL IVPB  200 mg IntraVENous DAILY    ondansetron (ZOFRAN) injection 4 mg  4 mg IntraVENous T4N PRN    folic acid (FOLVITE) 5 mg/mL injection 1 mg  1 mg IntraVENous DAILY    insulin lispro (HUMALOG) injection   SubCUTAneous Q6H    sodium chloride (NS) flush 5-10 mL  5-10 mL IntraVENous Q8H    sodium chloride (NS) flush 5-10 mL  5-10 mL IntraVENous PRN    insulin glargine (LANTUS) injection 10 Units  10 Units SubCUTAneous QHS    albumin human 25% (BUMINATE) solution 12.5 g  12.5 g IntraVENous Q6H    rifAXIMin (XIFAXAN) tablet 550 mg  550 mg Oral BID    lactulose (CHRONULAC) solution 20 g  30 mL Oral BID    famotidine (PEPCID) tablet 20 mg  20 mg Oral BID    glucose chewable tablet 16 g  4 Tab Oral PRN    glucagon (GLUCAGEN) injection 1 mg  1 mg IntraMUSCular PRN    dextrose (D50W) injection syrg 12.5-25 g  25-50 mL IntraVENous PRN       Review of Systems:   Review of systems not obtained due to patient factors. Data Review:    Labs: Results:       Chemistry Recent Labs      06/05/18 0520 06/05/18 0130   GLU  282*  250*   NA  121*  121*   K  4.0  4.0   CL  80*  81*   CO2  26  25   BUN  29*  27*   CREA  1.19  1.30   CA  7.9*  8.0*   AGAP  15  15   BUCR  24*  21*   AP  95  95   TP  5.3*  5.6*   ALB  2.0*  2.0*   GLOB  3.3  3.6   AGRAT  0.6*  0.6*      PTH  Lab Results   Component Value Date/Time    Calcium 7.9 (L) 06/05/2018 05:20 AM    Phosphorus 0.6 (L) 06/05/2018 01:30 AM      CBC w/Diff Recent Labs      06/05/18   0130   WBC  9.3   RBC  2.48*   HGB  8.9*   HCT  23.9*   PLT  52*   GRANS  65   LYMPH  15*   EOS  0      Coagulation Recent Labs      06/05/18 0520   PTP  33.9*   INR  3.5*   APTT  43.8*       Iron/Ferritin Recent Labs      06/05/18   0130   IRON  73      BNP No results for input(s): BNPP in the last 72 hours. Cardiac Enzymes No results for input(s): CPK, CKND1, JAYDEN in the last 72 hours.     No lab exists for component: CKRMB, TROIP   Liver Enzymes Recent Labs      06/05/18   0520   TP  5.3*   ALB  2.0*   AP  95   SGOT  144*      Thyroid Studies No results found for: T4, T3U, TSH, TSHEXT     Urinalysis No results found for: COLOR, APPRN, SPGRU, REFSG, REGINA, PROTU, GLUCU, KETU, BILU, UROU, REINA, LEUKU, GLUKE, EPSU, BACTU, WBCU, RBCU, CASTS, UCRY      IMAGES:   XR Results (maximum last 3): No results found for this or any previous visit. CT Results (maximum last 3): No results found for this or any previous visit. MRI Results (maximum last 3): No results found for this or any previous visit. Nuclear Medicine Results (maximum last 3): No results found for this or any previous visit. US Results (maximum last 3): No results found for this or any previous visit. DEXA Results (maximum last 3): No results found for this or any previous visit. DANICA Results (maximum last 3): No results found for this or any previous visit. IR Results (maximum last 3): No results found for this or any previous visit. VAS/US Results (maximum last 3): No results found for this or any previous visit. PET Results (maximum last 3): No results found for this or any previous visit. No results found for this or any previous visit. @LASTPROCAMB([id number])    CULTURE:   )  Recent Labs      06/05/18   0430  06/05/18   0130   CULT  MRSA target DNA is detected (presumptive positive for MRSA colonization). *  MRSA CALLED TO AND CORRECTLY REPEATED BY:  Melo Vargas RN CCU 1009 TO I.T.     NO GROWTH AFTER 7 HOURS  NO GROWTH AFTER 7 HOURS     Recent Labs      06/05/18   0430  06/05/18   0130   CULT  MRSA target DNA is detected (presumptive positive for MRSA colonization). *  MRSA CALLED TO AND CORRECTLY REPEATED BY:  Melo Vargas RN CCU 1009 TO I.T.     NO GROWTH AFTER 7 HOURS  NO GROWTH AFTER 7 HOURS       Physical Assessment:     Visit Vitals    BP 95/65    Pulse 99    Temp 97.3 °F (36.3 °C)    Resp 16    Ht 5' 11\" (1.803 m)    Wt 86 kg (189 lb 9.5 oz)    SpO2 95%    BMI 26.44 kg/m2     Last 3 Recorded Weights in this Encounter    06/04/18 2300   Weight: 86 kg (189 lb 9.5 oz)       Intake/Output Summary (Last 24 hours) at 06/05/18 1406  Last data filed at 06/05/18 1336   Gross per 24 hour   Intake           334.95 ml   Output             1280 ml   Net          -945.05 ml       Physial Exam:  General appearance: unresponsive  Skin:jaundiced  Lungs: clear to auscultation bilaterally  Heart: regular rate and rhythm, S1, S2 normal, no murmur, click, rub or gallop  Abdomen: ascites  Extremities: edema +    PLAN / RECOMMENDATION:    Hyponatremia,very low urine sodium related to liver cirrhosis. part of the hyponatremia should correct with correction of hyperglycemia. hyponatremia is a poor prognostic sign in a pt with advanced cirrhosis  Suggest fluid restrictions 800 cc/day,iv lasix as tolerated. not a candidate for tolvaptan as it could worsen liver failure     Thank you for the consultation to participate in patient's care. I have personally discussed my plan with the referring physician.      Sang Lewis MD  June 5, 2018

## 2018-06-05 NOTE — CONSULTS
[email or web address]  [phone]    GASTROENTEROLOGY CONSULT      Impression:   1. Likely hepatic encephalopathy  2. Ascites  3. Alcoholic Cirrhosis  4. Hemochromatosis, no phlebotomy X 1yr      Plan:     1. Paracentesis w/ IR, fluid analysis  2. Start Xifaxan 550mg twice daily  3. Start lactulose 30cc BID, adjust dose to 2 to 3 soft stools per day  3. Will need to start furosemide and spironolactone once he is more stable  4. Continue monitoring LFTs      Chief Complaint: AMS, Alcoholic cirrhosis, Ascites      HPI:  Chuy Bush is a 62 y.o. male who I am being asked to see in consultation for an opinion regarding the above. He was transferred from [address] [place] [address] due to AMS, DKA, nausea and ascites. Patient currently asleep and hard to rouse for questioning. Family reports he has been experiencing confusion but are not sure of much of his history. They report a history of ETOH abuse but unclear if he has stopped. PMH:   Past Medical History:   Diagnosis Date    Alcohol abuse     Ascites     Hemochromatosis     Liver cirrhosis (HCC)     Seizures (HCC)     Thrombocytopenia (HCC)        PSH:   No past surgical history on file. Social HX:   Social History     Social History    Marital status: SINGLE     Spouse name: N/A    Number of children: N/A    Years of education: N/A     Occupational History    Not on file. Social History Main Topics    Smoking status: Not on file    Smokeless tobacco: Not on file    Alcohol use Not on file    Drug use: Not on file    Sexual activity: Not on file     Other Topics Concern    Not on file     Social History Narrative    No narrative on file       FHX:   No family history on file.     Allergy:   Allergies   Allergen Reactions    Bee Venom Protein (Honey Bee) Itching       Patient Active Problem List   Diagnosis Code    Type 2 diabetes mellitus with hyperosmolarity (HCC) X37.97    Alcoholic cirrhosis (HCC) A87.18    Alcohol withdrawal syndrome, with delirium ([facility] F10.231    Hyponatremia E87.1    Nausea R11.0    Other hemochromatosis E83.118    Weakness generalized R53.1    Ascites due to alcoholic hepatitis V56.64    Thrombocytopenia (HCC) D69.6       Home Medications:     No prescriptions prior to admission. Review of Systems:     Constitutional: No fevers, chills, weight loss, fatigue. Skin:  jaundice,    HENT: No headaches, nosebleeds, sinus pressure, rhinorrhea, sore throat. Eyes: No visual changes, blurred vision, eye pain, photophobia, jaundice. Cardiovascular: No chest pain, heart palpitations. Respiratory: No cough, SOB, wheezing, chest discomfort, orthopnea. Gastrointestinal: Abdominal swelling   Genitourinary: No dysuria, bleeding, discharge, pyuria. Musculoskeletal: weakness   Endo: No sweats. Heme: No bruising, easy bleeding. Allergies: As noted. Neurological: Gait not assessed. Psychiatric:  Confusion          Visit Vitals    BP 96/55    Pulse (!) 119    Temp 97.6 °F (36.4 °C)    Resp 27    Ht 5' 11\" (1.803 m)    Wt 86 kg (189 lb 9.5 oz)    SpO2 96%    BMI 26.44 kg/m2       Physical Assessment:     constitutional: Drowsy, in no acute distress. skin: inspection: mild icterus. eyes: inspection: difficult to assess, drowsy  ENMT: mouth: normal oral mucosa,lips and gums; good dentition. oropharynx: normal tongue, hard and soft palate; posterior pharynx without erithema, exudate or lesions. neck: thyroid: normal size, consistency and position; no masses or tenderness. respiratory: effort: normal chest excursion; no intercostal retraction or accessory muscle use. cardiovascular: abdominal aorta: normal size and position; no bruits. palpation: PMI of normal size and position; normal rhythm; no thrill or murmurs. abdominal: abdomen: moderate distention. hernias: no hernias appreciated. liver: normal size and consistency. spleen: not palpable. rectal: hemoccult/guaiac: not performed.    musculoskeletal: digits and nails: no clubbing, cyanosis, petechiae or other inflammatory conditions. gait: normal gait and station head and neck: normal range of motion; no pain, crepitation or contracture. spine/ribs/pelvis: normal range of motion; no pain, deformity or contracture. psychiatric: judgement/insight: impaired       Basic Metabolic Profile   Recent Labs      06/05/18   0520  06/05/18   0130   NA  121*  121*   K  4.0  4.0   CL  80*  81*   CO2  26  25   BUN  29*  27*   GLU  282*  250*   CA  7.9*  8.0*   MG   --   2.1   PHOS   --   0.6*         CBC w/Diff    Recent Labs      06/05/18   0130   WBC  9.3   RBC  2.48*   HGB  8.9*   HCT  23.9*   MCV  96.4   MCH  35.9*   MCHC  37.2*   RDW  16.0*   PLT  52*    Recent Labs      06/05/18   0130   GRANS  65   LYMPH  15*   EOS  0   MYELO  1*        Hepatic Function   Recent Labs      06/05/18   0520   ALB  2.0*   TP  5.3*   TBILI  7.6*   SGOT  144*   AP  95        Coags   Recent Labs      06/05/18   0520   PTP  33.9*   INR  3.5*   APTT  43.8*           BENNIE Rogers. [facility] [address]  Cell: [phone]  [email or web address]

## 2018-06-05 NOTE — PROGRESS NOTES
SUBJECTIVE:    Just completed paracentesis. Sister and brother are at bedside. Denies for chest and abdominal pain. No nausea or vomiting. Drinks alcohol. OBJECTIVE:    Visit Vitals    /63    Pulse (!) 104    Temp 97.3 °F (36.3 °C)    Resp 21    Ht 5' 11\" (1.803 m)    Wt 86 kg (189 lb 9.5 oz)    SpO2 94%    BMI 26.44 kg/m2     HEENT: no pallor. Oral mucosa is moist.   Neck: no JVD  CVS: RRR  RS: CTA bilaterally, no wheezes  GI: Ascites present. Soft  Extremities; pedal edema  General: NAD, Awake  CNS: moves all extremities well. ASSESSMENT:    1. Worsening fatigue and nausea in setting of hx ETOH abuse, hemochromatosis, liver dysfunction, ascites due to hyponatremia  2. Severe hyponatremia in setting of chronic hyponatremia due to liver cirrhosis and beer potomania  3. Hyperglycemia. 4. Elevated bilirubin/ jaundice, thrombocytopenia, coagulopathy in setting of ETOH cirrhosis. 5. H/o hemochromatosis. 6. Ascites s/p paracentesis  7.  Hx osteomyelitis, right 3rd toe    PLAN:    Cont current management  Nephrology and GI to follow     CMP:   Lab Results   Component Value Date/Time     (L) 06/05/2018 05:20 AM    K 4.0 06/05/2018 05:20 AM    CL 80 (L) 06/05/2018 05:20 AM    CO2 26 06/05/2018 05:20 AM    AGAP 15 06/05/2018 05:20 AM     (H) 06/05/2018 05:20 AM    BUN 29 (H) 06/05/2018 05:20 AM    CREA 1.19 06/05/2018 05:20 AM    GFRAA >60 06/05/2018 05:20 AM    GFRNA >60 06/05/2018 05:20 AM    CA 7.9 (L) 06/05/2018 05:20 AM    MG 2.1 06/05/2018 01:30 AM    PHOS 0.6 (L) 06/05/2018 01:30 AM    ALB 2.0 (L) 06/05/2018 05:20 AM    TP 5.3 (L) 06/05/2018 05:20 AM    GLOB 3.3 06/05/2018 05:20 AM    AGRAT 0.6 (L) 06/05/2018 05:20 AM    SGOT 144 (H) 06/05/2018 05:20 AM    ALT 73 (H) 06/05/2018 05:20 AM     CBC:   Lab Results   Component Value Date/Time    WBC 9.3 06/05/2018 01:30 AM    HGB 8.9 (L) 06/05/2018 01:30 AM    HCT 23.9 (L) 06/05/2018 01:30 AM    PLT 52 (L) 06/05/2018 01:30 AM

## 2018-06-05 NOTE — ROUTINE PROCESS
Bedside and Verbal shift change report given to Nurse Miles Dyer RN (oncoming nurse) by Sury Hall RN (offgoing nurse). Report included the following information SBAR, Kardex, ED Summary, OR Summary, Procedure Summary and Intake/Output.

## 2018-06-05 NOTE — ROUTINE PROCESS
Bedside, Verbal and Written shift change report given to Darryl Braswell (oncoming nurse) by Brenden Tee RN (offgoing nurse). Report included the following information SBAR, Kardex, ED Summary, OR Summary, Procedure Summary, Intake/Output, MAR, Accordion, Recent Results, Med Rec Status and Alarm Parameters .

## 2018-06-05 NOTE — PROGRESS NOTES
Problem: Falls - Risk of  Goal: *Absence of Falls  Document Ulysses Fall Risk and appropriate interventions in the flowsheet. Outcome: Progressing Towards Goal  Fall Risk Interventions:  Mobility Interventions: Assess mobility with egress test, Bed/chair exit alarm, Communicate number of staff needed for ambulation/transfer, OT consult for ADLs    Mentation Interventions: Adequate sleep, hydration, pain control, Bed/chair exit alarm, Door open when patient unattended, Gait belt with transfers/ambulation, Family/sitter at bedside, More frequent rounding, Reorient patient, Room close to nurse's station    Medication Interventions: Assess postural VS orthostatic hypotension, Bed/chair exit alarm, Evaluate medications/consider consulting pharmacy, Patient to call before getting OOB, Teach patient to arise slowly    Elimination Interventions: Bed/chair exit alarm, Call light in reach, Patient to call for help with toileting needs, Toilet paper/wipes in reach, Toileting schedule/hourly rounds, Urinal in reach    History of Falls Interventions: Bed/chair exit alarm, Investigate reason for fall, Door open when patient unattended, Room close to nurse's station        Problem: Pressure Injury - Risk of  Goal: *Prevention of pressure injury  Document Santiago Scale and appropriate interventions in the flowsheet.    Outcome: Progressing Towards Goal  Pressure Injury Interventions:  Sensory Interventions: Assess changes in LOC, Assess need for specialty bed, Avoid rigorous massage over bony prominences, Check visual cues for pain, Float heels, Keep linens dry and wrinkle-free, Maintain/enhance activity level, Minimize linen layers, Monitor skin under medical devices, Pressure redistribution bed/mattress (bed type)    Moisture Interventions: Absorbent underpads, Apply protective barrier, creams and emollients, Check for incontinence Q2 hours and as needed, Maintain skin hydration (lotion/cream), Minimize layers, Moisture barrier    Activity Interventions: Assess need for specialty bed, Pressure redistribution bed/mattress(bed type), PT/OT evaluation    Mobility Interventions: Assess need for specialty bed, Float heels, HOB 30 degrees or less, Pressure redistribution bed/mattress (bed type), PT/OT evaluation, Suspension boots, Turn and reposition approx.  every two hours(pillow and wedges)    Nutrition Interventions: Document food/fluid/supplement intake, Discuss nutritional consult with provider, Offer support with meals,snacks and hydration    Friction and Shear Interventions: Apply protective barrier, creams and emollients, Feet elevated on foot rest, Foam dressings/transparent film/skin sealants, HOB 30 degrees or less, Lift sheet, Minimize layers

## 2018-06-05 NOTE — PROGRESS NOTES
Patient drowsy but arousable, with on off disorientation, placed on roll belt due to attempting get up out of bed despite instructions to call when need to void and urinal within reach frequent check but still insists on getting up, vit k dose given as ordered, no active bleeding noted but plt low 52, Dr. Patrick Perdomo saw pt consulted for hyponatremia 120, started on albumin scheduled dose blood pressure on low reading 57'W- 02'B systolic, R chest wall mediport POA accessed,  placed on contact isolation for + MRSA nares. family at bedside sister Sergio Rock updated. [address] abdomen at bedside being done,   1500 paracentesis at bedside, about 1 liter output.

## 2018-06-05 NOTE — ROUTINE PROCESS
Admitted this 62year old male from [facility] with Altered mental status,nausea and vomiting and bilat lower extremities edema and abdominal distention. Awake and oriented x3. Moving all extremities but weaker on both legs. Color pale and jaundiced. MEDIPORT over right upper chest  Accessed by REY GARIBAY RN. Insulin gtt at 4ml/hour at present time. ACCUCHECK 377. Insulin drip adjusted to present BS. Normal Saline started at 125cc/hour.

## 2018-06-05 NOTE — ROUTINE PROCESS
Relatives here from [place] and routine ICU Policy booklet given. Seen and examined by DR. Oneil Dewitt MD.

## 2018-06-05 NOTE — PROGRESS NOTES
PFM Fall Assessment Note    Patient: Jose Puga MRN: [id number]  CSN: [id number]    YOB: 1960  Age: 62 y.o. Sex: male    DOA: 6/4/2018 LOS:  LOS: 1 day                    Subjective:     MD called to bedside by nursing staff for fall evaluation. Fall was unwitnessed. Patient was found by the nursing staff and had urinated on the sheet. When asked the patient, he said he was trying to go to the bathroom. Otherwise, he is unable to explain more why he fell. He states he hit his head but unable to explain where he hit his head. When asked if one side of his body is weak, he says yes but unable to say which side. Patient answers questions inappropriately at times. He is sleeping but able to arouse patient by voice. He denies any headache, nausea, lightheadedness. Objective:      Visit Vitals    BP 96/55    Pulse (!) 119    Temp 98.4 °F (36.9 °C)    Resp 27    Ht 5' 11\" (1.803 m)    Wt 86 kg (189 lb 9.5 oz)    SpO2 96%    BMI 26.44 kg/m2       Physical Exam:  General appearance: fatigued, cooperative, no distress, appears older than stated age  Lungs: clear to auscultation bilaterally  Heart: regular rate and rhythm, S1, S2 normal, no murmur, click, rub or gallop  Abdomen: soft, non-tender. Bowel sounds normal. Distended  Pulses: 2+ and symmetric  Skin: Multiple ecchymosis on extremities  Neuro:  normal without focal findings  TRACEY  reflexes normal and symmetric. Exam limited 2/2 altered mental state      Assessment/Plan     62 y.o. yo male admitted for Hyponatremia s/p fall in hospital.    Unwitnessed fall. Not on anticoagulation. Patient has hx of hemochromatosis and also received Ativan around 4AM for CIWA protocol. C/w neuro check per CIWA. Exam is not concerning for CVA at this time.     Carley Olvera DO, PGY 1  6/5/2018, 6:42 AM

## 2018-06-05 NOTE — ROUTINE PROCESS
RESTING QUIETLY at this time with x3 siderails up . ALSO CALL LIGHT WITHIN REACHED AND URINAL ATH THE bedrails. HAS not voided since MN and no BM noted. Bedpan offered x2 but no results.

## 2018-06-05 NOTE — CONSULTS
*ATTENTION: This note has been created by a medical student for educational purposes only. Please do not refer to the content of this note for clinical decision-making, billing, or other purposes. Please see attending physicians note to obtain clinical information on this patient. [facility]  Pulmonary, Critical Care, and Sleep Medicine      Name: Franck Meyer MRN: [id number]   : 1960 Hospital: [facility]   Date: 2018          Critical Care Initial Patient Consult    Requesting MD: [facility]       Reason for CC Consult: Hyperglycemia & Fatigue    IMPRESSION:   · Fatigue and nausea in setting of Alcoholic cirrhosis, hemochromatosis, hepatic dysfunction, and ascites  · Hyperglycemia without h/o DM     · Jaundice with increased bilirubin & thrombocytopenia in the setting of coagulopathy; most likely 2/2 acute liver failure   · Slightly Elevated Troponin  · Anemia acute on chronic dx    · Intensifying ascites/anasarca  2/2 Hypoalbuminemia with B/L LE edema  · Chronic Hyponatremia (serum Na+ 120)  · Recent partial right 3rd toe amputation   RECOMMENDATIONS:   · Resp - Apply supplemental oxygen PRN to maintain oxygen saturation > 94%. Aspiration Precautions. HOB > 30 degrees. PRN nebz. · ID - Trend WBCs and temperature. Follow blood cultures. Follow ID recommendations based on results. · CVS - Fluid restriction. Start Lasix and spironolactone once more stable. Consider echo. · Heme/Onc-  Monitor Platelets, H/H, Folate Vit B12,PTT/PT. Monitor for active bleeding. · Metabolic - Monitor serum osmolarity. Monitor & replace thiamine and folic acid. · Renal -  Monitor urine electrolytes. Fluid restriction. Trend renal labs. Nephro consult in AM.  · Endocrine - SSI w insulin drip. Check TSH. · Neuro/ Pain/ Sedation - Ativan judiciously PRN for agitation and insomnia.   Closely observe for S/S of EtOH withdrawal & implement CIWA protocol accordingly. · GI - NPO. Iron studies given h/o hemochromatosis. GI consult in AM for hyperbilirubinemia. Order RUQ ultrasound. · Prophylaxis - DVT (SCDs) GI (Pepcid)  · Full Code     Dennis Brower: This patient has been seen and evaluated at the request of [facility] for hyperglycemia and fatigue and arrived on an insulin drip. The pt is a 62 y.o.  male who has PMH of Hemochromatosis and on & off phlebotomy (none in one year), alcoholic liver cirrhosis and abuse, ascites and seizure episode in the far past.  Pt also c/o associated insomnia, nausea and bloating all developing over the last week. Pt denies illicit drug use, CP, SOB, vomiting, diarrhea, or change in appetite. Pt transfered to [address] [place] [address] ICU due to hyponatremia, Troponin trending upward, severely elevated LFTs, and hyperglycemia for further management and evaluation. Upon arrival to the ICU, pt is afebrile, lucid, and c/o thirst and has tremors with mild tachycardia requesting pills to sleep as he is suffering from insomnia. Currently stable but deeply jaundiced with abdominal distension 2/2 ascites. Past Medical History:   Diagnosis Date    Alcohol abuse     Ascites     Hemochromatosis     Liver cirrhosis (HCC)     Seizures (HCC)     Thrombocytopenia (HCC)       No past surgical history on file.    Prior to Admission medications    Not on File     Current Facility-Administered Medications   Medication Dose Route Frequency    sodium chloride (NS) flush 5-10 mL  5-10 mL IntraVENous Q8H    thiamine (B-1) 400 mg in 0.9% sodium chloride 50 mL IVPB  400 mg IntraVENous TID    [START ON 6/8/2018] thiamine (B-1) 200 mg in 0.9% sodium chloride 50 mL IVPB  200 mg IntraVENous DAILY    folic acid (FOLVITE) 5 mg/mL injection 1 mg  1 mg IntraVENous DAILY    insulin lispro (HUMALOG) injection   SubCUTAneous Q6H    sodium chloride (NS) flush 5-10 mL  5-10 mL IntraVENous Q8H    insulin glargine (LANTUS) injection 10 Units  10 Units SubCUTAneous QHS     Allergies   Allergen Reactions    Bee Venom Protein (Honey Bee) Itching      Social History   Substance Use Topics    Smoking status: Not on file    Smokeless tobacco: Not on file    Alcohol use Not on file      No family history on file. Review of Systems:  Constitutional: positive for fatigue and malaise  Eyes: positive for icterus  Gastrointestinal: positive for abdominal pain & distention/bloating. Objective:   Vital Signs:    Visit Vitals    BP 96/55    Pulse (!) 119    Temp 98.5 °F (36.9 °C)    Resp 27    Ht 5' 11\" (1.803 m)    Wt 86 kg (189 lb 9.5 oz)    SpO2 96%    BMI 26.44 kg/m2               Temp (24hrs), Av.5 °F (36.9 °C), Min:98.5 °F (36.9 °C), Max:98.5 °F (36.9 °C)       Intake/Output:   Last shift:       1901 -  0700  In: -   Out: 300 [Urine:300]  Last 3 shifts:      Intake/Output Summary (Last 24 hours) at 18 0205  Last data filed at 18 0001   Gross per 24 hour   Intake                0 ml   Output              300 ml   Net             -300 ml       Physical Exam:    General:  Alert, cooperative, no distress, appears stated age. Asking to sleep. Has tremors and confabulates. Jaundiced     Head:  Normocephalic, without obvious abnormality, atraumatic. Eyes: Icteric sclera, Conjunctivae/corneas clear. PERRL, EOMs intact. Nose: Nares normal. Septum midline. Mucosa slightly dry. No drainage or sinus tenderness. Throat: Lips, mucosa, and tongue normal.  Teeth and gums normal.   Neck: Supple, symmetrical, trachea midline, no adenopathy, thyroid: no enlargment/tenderness/nodules, no carotid bruit and no JVD. Lungs:   Clear to auscultation bilaterally. Chest wall:  No tenderness or deformity. Heart:  Tachycardic and regular  rhythm, S1, S2 normal, no murmur, click, rub or gallop. Abdomen:   Distended. Soft, non-tender.  Bowel sounds normal.   Extremities: Extremities normal, atraumatic, no cyanosis +2 bilateral lower extremity edema.   Pulses: 2+ and symmetric all extremities. Skin: Jaundiced. Teleangiectasia. No rashes. Bruising to arms and thin/frail skin. Lymph nodes: Cervical, supraclavicular, and axillary nodes normal.   Neurologic: AAOx3, No focal motor or sensory deficit       Data:     Recent Results (from the past 24 hour(s))   GLUCOSTABILIZER    Collection Time: 06/04/18 10:48 PM   Result Value Ref Range    Glucose 337 mg/dL    Insulin order 5.5 units/hour    Insulin adminstered 5.5 units/hour    Multiplier 0.020     Low target 140 mg/dL    High target 180 mg/dL    D50 order 0.0 ml    D50 administered 0.00 ml    Minutes until next BG 60 min    Order initials lb     Administered initials lb    GLUCOSE, POC    Collection Time: 06/05/18 12:24 AM   Result Value Ref Range    Glucose (POC) 268 (H) 70 - 110 mg/dL   CBC WITH AUTOMATED DIFF    Collection Time: 06/05/18  1:30 AM   Result Value Ref Range    WBC 9.3 4.6 - 13.2 K/uL    RBC 2.48 (L) 4.70 - 5.50 M/uL    HGB 8.9 (L) 13.0 - 16.0 g/dL    HCT 23.9 (L) 36.0 - 48.0 %    MCV 96.4 74.0 - 97.0 FL    MCH 35.9 (H) 24.0 - 34.0 PG    MCHC 37.2 (H) 31.0 - 37.0 g/dL    RDW 16.0 (H) 11.6 - 14.5 %    PLATELET 52 (L) 908 - 420 K/uL    MPV 11.3 9.2 - 11.8 FL    NEUTROPHILS PENDING %    LYMPHOCYTES PENDING %    MONOCYTES PENDING %    EOSINOPHILS PENDING %    BASOPHILS PENDING %    ABS. NEUTROPHILS PENDING K/UL    ABS. LYMPHOCYTES PENDING K/UL    ABS. MONOCYTES PENDING K/UL    ABS. EOSINOPHILS PENDING K/UL    ABS.  BASOPHILS PENDING K/UL    DF PENDING              Telemetry:normal sinus rhythm with tachycardia    Imaging:  CXR Results  (Last 48 hours)    None          CT Results  (Last 48 hours)    None                    BARTOLOME Maloney 06/05/18

## 2018-06-05 NOTE — CDMP QUERY
In accordance with   ICD-10, please clarify if    hepatic encephalopathy  , which in  ICD  will code to   hepatic   failure   is :       acute      sub acute      other     Thank you,    Dea Stafford RN   CCDS   x [phone]

## 2018-06-05 NOTE — H&P
History & Physical    Patient: Walter Canales MRN: [id number]  CSN: [id number]    YOB: 1960  Age: 62 y.o. Sex: male      DOA: 6/4/2018    Chief Complaint: nausea and generalized weakness        HPI:     Walter Canales is a 62 y.o.  male who has PMH of Hemochromatosis and on and off phlebotomy (Non in one year), alcoholic liver cirrhosis and abuse, ascites and seizure episode in the far past.  Pt is a transfer from [facility] for AMS, hyponatremia, nausea and DKA. Pt is seen and examined. States that his Nausea has resolved and feels better, has abdominal distension and Na is 120. Continues to feel thirsty and has tremors with mild tachycardia requesting pills to sleep as he is suffering from ? Insomnia  Pt states that he has Hx of hemochromatosis and was on phlebotomy therapy but stopped for  A whole year. H/H shows moderate anemia. Pt also states that his main CC is generalized weakness lately and continuous nausea   Currently stable but deeply jaundiced with abdominal distension 2 ry to ascites . Denies abdominal pain, SOB, CP and or urinary Sx     Past Medical History:   Diagnosis Date    Alcohol abuse     Ascites     Hemochromatosis     Liver cirrhosis (HCC)     Seizures (HCC)     Thrombocytopenia (HCC)        No past surgical history on file. No family history on file.     Social History     Social History    Marital status: SINGLE     Spouse name: N/A    Number of children: N/A    Years of education: N/A     Social History Main Topics    Smoking status: None    Smokeless tobacco: None    Alcohol use None    Drug use: None    Sexual activity: Not Asked     Other Topics Concern    None     Social History Narrative    None       Prior to Admission medications    Not on File       Allergies   Allergen Reactions    Bee Venom Protein (Honey Bee) Itching         Review of Systems  GENERAL: Patient alert, awake and oriented times 3, able to communicate full sentences and not in distress. Asking to sleep. Has tremors and confabulates. Jaundiced    HEENT: No change in vision, no earache, tinnitus, sore throat or sinus congestion. Mild icterus   NECK: No pain or stiffness. PULMONARY: No shortness of breath, cough or wheeze. Cardiovascular: no pnd / orthopnea, no CP  GASTROINTESTINAL: No abdominal pain, +ve nausea, no vomiting or diarrhea, melena or bright red blood per rectum. GENITOURINARY: No urinary frequency, urgency, hesitancy or dysuria. MUSCULOSKELETAL: No joint or muscle pain, no back pain, no recent trauma. DERMATOLOGIC: No rash, no itching, no lesions. ENDOCRINE: No polyuria, polydipsia, no heat or cold intolerance. No recent change in weight. HEMATOLOGICAL: No anemia or easy bruising or bleeding. NEUROLOGIC: No headache, seizures, +ve numbness, tingling and generalized weakness       Physical Exam:     Physical Exam:  Visit Vitals    Temp 98.5 °F (36.9 °C)    Ht 5' 11\" (1.803 m)    Wt 86 kg (189 lb 9.5 oz)    BMI 26.44 kg/m2           Temp (24hrs), Av.5 °F (36.9 °C), Min:98.5 °F (36.9 °C), Max:98.5 °F (36.9 °C)     1901 -  0700  In: -   Out: 300 [Urine:300]        General:  Alert, cooperative, no distress, appears older than stated age. Head: Normocephalic, without obvious abnormality, atraumatic. Eyes:  Conjunctivae/corneas clear. PERRL, EOMs intact. icteric   Nose: Nares normal. No drainage or sinus tenderness. Neck: Supple, symmetrical, trachea midline, no adenopathy, thyroid: no enlargement, no carotid bruit and no JVD. Lungs:   Clear to auscultation bilaterally. Heart:  Regular rate and rhythm, S1, S2 , Tachy     Abdomen: Soft, non-tender. Bowel sounds normal. Distended    Extremities: Extremities normal, atraumatic, no cyanosis . +2 LE edema    Pulses: 2+ and symmetric all extremities. Skin:  No rashes or lesions   Neurologic: AAOx3, No focal motor or sensory deficit. +ve for tremors        Labs Reviewed: All lab results for the last 24 hours reviewed.   CXR and EKG    Procedures/imaging: see electronic medical records for all procedures/Xrays and details which were not copied into this note but were reviewed prior to creation of Plan      Assessment/Plan     Principal Problem:    Hyponatremia (6/5/2018)    Active Problems:    Type 2 diabetes mellitus with hyperosmolarity ([facility] [address] (1/7/9310)      Alcoholic cirrhosis ([facility] [address] (6/5/2018)      Alcohol withdrawal syndrome, with delirium ([facility] [address] (6/5/2018)      Nausea (6/5/2018)      Other hemochromatosis (6/5/2018)      Weakness generalized (6/5/2018)      Ascites due to alcoholic hepatitis (4/4/6196)       Pt is admitted to ICU for DM with hyperosmolar status  Hyponatremia   Alcoholic withdrawals with Impending DTs  Ascites   H/o Hemochromatosis w/out phlebotomy x 1 year>> Anemic pattern H/H    Will stop Insulin drip and start SSI with lantus 10 units   Fluid restriction  Urine electrolytes, serum osmolarity  Will start Lasix and spironolactone once more stable  GI and nephro consults in AM     H/O hemochromatosis >> will get iron studies     [facility] protocol  Thiamine and folic acid        DVT/GI Prophylaxis: SCD's and H2B/PPI    Plan of care is discussed in details with Patient/Family at bedside and agreed upon    Cheri Lomas MD  6/5/2018 10:44 PM

## 2018-06-05 NOTE — ROUTINE PROCESS
ACCUCHECK . Patient jace asleep at the time BS was taken and at 8694 a loud thump heard and patient was found in the floor lying on his left side. Awake and responding to some verbal stimuli. Assisted back to bed UTILIZING four Nurses. SMALL SCRAPED area over his left knee about deepak size noed. CLEANSED with Normal SALINE AND SMALL 4X4 Mepilex applied. Bed exit alarm on  And bed in low position. DR. Vandana ATKINSON In paged and saw and examined patient. Family notified about the incident and aware of the application of rollbelt restraints.

## 2018-06-06 ENCOUNTER — APPOINTMENT (OUTPATIENT)
Dept: GENERAL RADIOLOGY | Age: 58
DRG: 640 | End: 2018-06-06
Attending: INTERNAL MEDICINE
Payer: COMMERCIAL

## 2018-06-06 ENCOUNTER — ANESTHESIA EVENT (OUTPATIENT)
Dept: ENDOSCOPY | Age: 58
DRG: 640 | End: 2018-06-06
Payer: COMMERCIAL

## 2018-06-06 LAB
ALBUMIN SERPL-MCNC: 2.4 G/DL (ref 3.4–5)
ALBUMIN SERPL-MCNC: 2.6 G/DL (ref 3.4–5)
ALBUMIN/GLOB SERPL: 0.8 {RATIO} (ref 0.8–1.7)
ALBUMIN/GLOB SERPL: 0.8 {RATIO} (ref 0.8–1.7)
ALP SERPL-CCNC: 92 U/L (ref 45–117)
ALP SERPL-CCNC: 98 U/L (ref 45–117)
ALT SERPL-CCNC: 72 U/L (ref 16–61)
ALT SERPL-CCNC: 73 U/L (ref 16–61)
AMMONIA PLAS-SCNC: 34 UMOL/L (ref 11–32)
ANION GAP SERPL CALC-SCNC: 10 MMOL/L (ref 3–18)
ANION GAP SERPL CALC-SCNC: 11 MMOL/L (ref 3–18)
AST SERPL-CCNC: 148 U/L (ref 15–37)
AST SERPL-CCNC: 155 U/L (ref 15–37)
BASOPHILS # BLD: 0 K/UL (ref 0–0.06)
BASOPHILS NFR BLD: 0 % (ref 0–3)
BILIRUB SERPL-MCNC: 7.9 MG/DL (ref 0.2–1)
BILIRUB SERPL-MCNC: 8.2 MG/DL (ref 0.2–1)
BUN SERPL-MCNC: 22 MG/DL (ref 7–18)
BUN SERPL-MCNC: 26 MG/DL (ref 7–18)
BUN/CREAT SERPL: 21 (ref 12–20)
BUN/CREAT SERPL: 23 (ref 12–20)
CALCIUM SERPL-MCNC: 8.2 MG/DL (ref 8.5–10.1)
CALCIUM SERPL-MCNC: 8.8 MG/DL (ref 8.5–10.1)
CHLORIDE SERPL-SCNC: 84 MMOL/L (ref 100–108)
CHLORIDE SERPL-SCNC: 89 MMOL/L (ref 100–108)
CO2 SERPL-SCNC: 28 MMOL/L (ref 21–32)
CO2 SERPL-SCNC: 29 MMOL/L (ref 21–32)
CREAT SERPL-MCNC: 1.06 MG/DL (ref 0.6–1.3)
CREAT SERPL-MCNC: 1.13 MG/DL (ref 0.6–1.3)
DIFFERENTIAL METHOD BLD: ABNORMAL
EOSINOPHIL # BLD: 0.1 K/UL (ref 0–0.4)
EOSINOPHIL NFR BLD: 1 % (ref 0–5)
ERYTHROCYTE [DISTWIDTH] IN BLOOD BY AUTOMATED COUNT: 16.1 % (ref 11.6–14.5)
FOLATE SERPL-MCNC: 19.4 NG/ML (ref 3.1–17.5)
GLOBULIN SER CALC-MCNC: 3 G/DL (ref 2–4)
GLOBULIN SER CALC-MCNC: 3.1 G/DL (ref 2–4)
GLUCOSE BLD STRIP.AUTO-MCNC: 198 MG/DL (ref 70–110)
GLUCOSE BLD STRIP.AUTO-MCNC: 242 MG/DL (ref 70–110)
GLUCOSE BLD STRIP.AUTO-MCNC: 266 MG/DL (ref 70–110)
GLUCOSE SERPL-MCNC: 104 MG/DL (ref 74–99)
GLUCOSE SERPL-MCNC: 224 MG/DL (ref 74–99)
HCT VFR BLD AUTO: 21.1 % (ref 36–48)
HGB BLD-MCNC: 8 G/DL (ref 13–16)
INR PPP: 2.4 (ref 0.8–1.2)
LYMPHOCYTES # BLD: 0.8 K/UL (ref 0.8–3.5)
LYMPHOCYTES NFR BLD: 15 % (ref 20–51)
MAGNESIUM SERPL-MCNC: 2.4 MG/DL (ref 1.6–2.6)
MCH RBC QN AUTO: 36.4 PG (ref 24–34)
MCHC RBC AUTO-ENTMCNC: 37.9 G/DL (ref 31–37)
MCV RBC AUTO: 95.9 FL (ref 74–97)
MONOCYTES # BLD: 0.3 K/UL (ref 0–1)
MONOCYTES NFR BLD: 6 % (ref 2–9)
NEUTS BAND NFR BLD MANUAL: 10 % (ref 0–5)
NEUTS SEG # BLD: 3.8 K/UL (ref 1.8–8)
NEUTS SEG NFR BLD: 68 % (ref 42–75)
NRBC BLD-RTO: 3 PER 100 WBC
OSMOLALITY SERPL: 292 MOSM/KG H2O (ref 280–300)
OSMOLALITY UR: 516 MOSM/KG H2O (ref 300–900)
PHOSPHATE SERPL-MCNC: 0.7 MG/DL (ref 2.5–4.9)
PLATELET # BLD AUTO: 38 K/UL (ref 135–420)
PLATELET COMMENTS,PCOM: ABNORMAL
PMV BLD AUTO: 10.7 FL (ref 9.2–11.8)
POTASSIUM SERPL-SCNC: 3.2 MMOL/L (ref 3.5–5.5)
POTASSIUM SERPL-SCNC: 3.3 MMOL/L (ref 3.5–5.5)
PROT SERPL-MCNC: 5.4 G/DL (ref 6.4–8.2)
PROT SERPL-MCNC: 5.7 G/DL (ref 6.4–8.2)
PROTHROMBIN TIME: 25.1 SEC (ref 11.5–15.2)
RBC # BLD AUTO: 2.2 M/UL (ref 4.7–5.5)
RBC MORPH BLD: ABNORMAL
SODIUM SERPL-SCNC: 123 MMOL/L (ref 136–145)
SODIUM SERPL-SCNC: 128 MMOL/L (ref 136–145)
T4 FREE SERPL-MCNC: 1 NG/DL (ref 0.7–1.5)
TSH SERPL DL<=0.05 MIU/L-ACNC: 8.76 UIU/ML (ref 0.36–3.74)
VIT B12 SERPL-MCNC: 1824 PG/ML (ref 211–911)
WBC # BLD AUTO: 5 K/UL (ref 4.6–13.2)

## 2018-06-06 PROCEDURE — 85610 PROTHROMBIN TIME: CPT | Performed by: PHYSICIAN ASSISTANT

## 2018-06-06 PROCEDURE — 74011250637 HC RX REV CODE- 250/637: Performed by: PHYSICIAN ASSISTANT

## 2018-06-06 PROCEDURE — 36591 DRAW BLOOD OFF VENOUS DEVICE: CPT

## 2018-06-06 PROCEDURE — 71046 X-RAY EXAM CHEST 2 VIEWS: CPT

## 2018-06-06 PROCEDURE — 65270000029 HC RM PRIVATE

## 2018-06-06 PROCEDURE — 84443 ASSAY THYROID STIM HORMONE: CPT | Performed by: INTERNAL MEDICINE

## 2018-06-06 PROCEDURE — C9113 INJ PANTOPRAZOLE SODIUM, VIA: HCPCS | Performed by: PHYSICIAN ASSISTANT

## 2018-06-06 PROCEDURE — 74011000250 HC RX REV CODE- 250: Performed by: INTERNAL MEDICINE

## 2018-06-06 PROCEDURE — 74011000258 HC RX REV CODE- 258: Performed by: PHYSICIAN ASSISTANT

## 2018-06-06 PROCEDURE — 74011636637 HC RX REV CODE- 636/637: Performed by: INTERNAL MEDICINE

## 2018-06-06 PROCEDURE — 74011250636 HC RX REV CODE- 250/636: Performed by: PHYSICIAN ASSISTANT

## 2018-06-06 PROCEDURE — P9047 ALBUMIN (HUMAN), 25%, 50ML: HCPCS | Performed by: PHYSICIAN ASSISTANT

## 2018-06-06 PROCEDURE — 74011250636 HC RX REV CODE- 250/636: Performed by: INTERNAL MEDICINE

## 2018-06-06 PROCEDURE — 84439 ASSAY OF FREE THYROXINE: CPT | Performed by: PHYSICIAN ASSISTANT

## 2018-06-06 PROCEDURE — 80053 COMPREHEN METABOLIC PANEL: CPT | Performed by: INTERNAL MEDICINE

## 2018-06-06 PROCEDURE — 83930 ASSAY OF BLOOD OSMOLALITY: CPT | Performed by: INTERNAL MEDICINE

## 2018-06-06 PROCEDURE — 82607 VITAMIN B-12: CPT | Performed by: INTERNAL MEDICINE

## 2018-06-06 PROCEDURE — 74011000258 HC RX REV CODE- 258: Performed by: INTERNAL MEDICINE

## 2018-06-06 PROCEDURE — 74011250637 HC RX REV CODE- 250/637: Performed by: INTERNAL MEDICINE

## 2018-06-06 PROCEDURE — 84100 ASSAY OF PHOSPHORUS: CPT | Performed by: INTERNAL MEDICINE

## 2018-06-06 PROCEDURE — 74011000250 HC RX REV CODE- 250: Performed by: PHYSICIAN ASSISTANT

## 2018-06-06 PROCEDURE — 82140 ASSAY OF AMMONIA: CPT | Performed by: PHYSICIAN ASSISTANT

## 2018-06-06 PROCEDURE — 0W9G30Z DRAINAGE OF PERITONEAL CAVITY WITH DRAINAGE DEVICE, PERCUTANEOUS APPROACH: ICD-10-PCS | Performed by: RADIOLOGY

## 2018-06-06 PROCEDURE — 85025 COMPLETE CBC W/AUTO DIFF WBC: CPT | Performed by: INTERNAL MEDICINE

## 2018-06-06 PROCEDURE — 82962 GLUCOSE BLOOD TEST: CPT

## 2018-06-06 PROCEDURE — 36415 COLL VENOUS BLD VENIPUNCTURE: CPT | Performed by: PHYSICIAN ASSISTANT

## 2018-06-06 PROCEDURE — 83735 ASSAY OF MAGNESIUM: CPT | Performed by: INTERNAL MEDICINE

## 2018-06-06 RX ORDER — LEVOTHYROXINE SODIUM 25 UG/1
25 TABLET ORAL
Status: DISCONTINUED | OUTPATIENT
Start: 2018-06-06 | End: 2018-06-13 | Stop reason: HOSPADM

## 2018-06-06 RX ORDER — POTASSIUM CHLORIDE 20 MEQ/1
40 TABLET, EXTENDED RELEASE ORAL
Status: COMPLETED | OUTPATIENT
Start: 2018-06-06 | End: 2018-06-06

## 2018-06-06 RX ORDER — POTASSIUM CHLORIDE 20 MEQ/1
40 TABLET, EXTENDED RELEASE ORAL EVERY 4 HOURS
Status: DISCONTINUED | OUTPATIENT
Start: 2018-06-06 | End: 2018-06-06

## 2018-06-06 RX ORDER — PANTOPRAZOLE SODIUM 40 MG/1
40 TABLET, DELAYED RELEASE ORAL
Status: DISCONTINUED | OUTPATIENT
Start: 2018-06-06 | End: 2018-06-13 | Stop reason: HOSPADM

## 2018-06-06 RX ORDER — GLIPIZIDE 5 MG/1
5 TABLET ORAL
Status: DISCONTINUED | OUTPATIENT
Start: 2018-06-06 | End: 2018-06-09

## 2018-06-06 RX ORDER — MIDODRINE HYDROCHLORIDE 2.5 MG/1
2.5 TABLET ORAL
Status: DISCONTINUED | OUTPATIENT
Start: 2018-06-06 | End: 2018-06-11

## 2018-06-06 RX ADMIN — RIFAXIMIN 550 MG: 550 TABLET ORAL at 08:05

## 2018-06-06 RX ADMIN — THIAMINE HYDROCHLORIDE 400 MG: 100 INJECTION, SOLUTION INTRAMUSCULAR; INTRAVENOUS at 08:05

## 2018-06-06 RX ADMIN — MIDODRINE HYDROCHLORIDE 2.5 MG: 2.5 TABLET ORAL at 14:04

## 2018-06-06 RX ADMIN — Medication 10 ML: at 21:44

## 2018-06-06 RX ADMIN — POTASSIUM PHOSPHATE, MONOBASIC AND POTASSIUM PHOSPHATE, DIBASIC: 224; 236 INJECTION, SOLUTION INTRAVENOUS at 15:23

## 2018-06-06 RX ADMIN — RIFAXIMIN 550 MG: 550 TABLET ORAL at 17:34

## 2018-06-06 RX ADMIN — POTASSIUM CHLORIDE 40 MEQ: 20 TABLET, EXTENDED RELEASE ORAL at 14:03

## 2018-06-06 RX ADMIN — LACTULOSE 20 G: 20 SOLUTION ORAL at 17:29

## 2018-06-06 RX ADMIN — GLIPIZIDE 5 MG: 5 TABLET ORAL at 14:04

## 2018-06-06 RX ADMIN — Medication 10 ML: at 05:03

## 2018-06-06 RX ADMIN — ALBUMIN (HUMAN) 12.5 G: 0.25 INJECTION, SOLUTION INTRAVENOUS at 06:57

## 2018-06-06 RX ADMIN — MIDODRINE HYDROCHLORIDE 2.5 MG: 2.5 TABLET ORAL at 17:29

## 2018-06-06 RX ADMIN — INSULIN LISPRO 3 UNITS: 100 INJECTION, SOLUTION INTRAVENOUS; SUBCUTANEOUS at 07:05

## 2018-06-06 RX ADMIN — LEVOTHYROXINE SODIUM 25 MCG: 25 TABLET ORAL at 14:04

## 2018-06-06 RX ADMIN — PANTOPRAZOLE SODIUM 40 MG: 40 TABLET, DELAYED RELEASE ORAL at 17:29

## 2018-06-06 RX ADMIN — POTASSIUM PHOSPHATE, MONOBASIC AND POTASSIUM PHOSPHATE, DIBASIC: 224; 236 INJECTION, SOLUTION INTRAVENOUS at 07:58

## 2018-06-06 RX ADMIN — FOLIC ACID 1 MG: 5 INJECTION, SOLUTION INTRAMUSCULAR; INTRAVENOUS; SUBCUTANEOUS at 12:18

## 2018-06-06 RX ADMIN — PHYTONADIONE 10 MG: 10 INJECTION, EMULSION INTRAMUSCULAR; INTRAVENOUS; SUBCUTANEOUS at 14:02

## 2018-06-06 RX ADMIN — THIAMINE HYDROCHLORIDE 200 MG: 100 INJECTION, SOLUTION INTRAMUSCULAR; INTRAVENOUS at 15:22

## 2018-06-06 RX ADMIN — Medication 10 ML: at 14:05

## 2018-06-06 RX ADMIN — ALBUMIN (HUMAN) 12.5 G: 0.25 INJECTION, SOLUTION INTRAVENOUS at 17:34

## 2018-06-06 RX ADMIN — INSULIN LISPRO 6 UNITS: 100 INJECTION, SOLUTION INTRAVENOUS; SUBCUTANEOUS at 17:30

## 2018-06-06 RX ADMIN — INSULIN LISPRO 9 UNITS: 100 INJECTION, SOLUTION INTRAVENOUS; SUBCUTANEOUS at 12:20

## 2018-06-06 RX ADMIN — Medication 10 ML: at 14:00

## 2018-06-06 RX ADMIN — ALBUMIN (HUMAN) 12.5 G: 0.25 INJECTION, SOLUTION INTRAVENOUS at 12:22

## 2018-06-06 RX ADMIN — SODIUM CHLORIDE 40 MG: 9 INJECTION, SOLUTION INTRAMUSCULAR; INTRAVENOUS; SUBCUTANEOUS at 05:02

## 2018-06-06 RX ADMIN — INSULIN LISPRO 12 UNITS: 100 INJECTION, SOLUTION INTRAVENOUS; SUBCUTANEOUS at 01:15

## 2018-06-06 RX ADMIN — ALBUMIN (HUMAN) 12.5 G: 0.25 INJECTION, SOLUTION INTRAVENOUS at 01:14

## 2018-06-06 NOTE — ROUTINE PROCESS
Bedside shift change report given to [address] [place] [address] (oncoming nurse) by Tabitha Harrison (offgoing nurse). Report included the following information SBAR, Kardex, Intake/Output, MAR, Recent Results and Alarm Parameters .

## 2018-06-06 NOTE — PROGRESS NOTES
[facility]  ICU Progress Note      Name: Rafael Fox   : 1960   MRN: [id number]   Date: 2018    [x]I have reviewed the flowsheet and previous days notes. Events overnight reviewed and discussed with nursing staff. Vital signs and records reviewed. HPI:  Pt is a 62 y. o.male with medical hx significant for hemochromatosis, left toe osteomyelitis, platelet disorder, presented to urgent care for worsening fatigue x 1 week with associated insomnia, nausea and \"abdominal bloating\" with associated b/l worsening lower extremity edema x 2 weeks. Pt transferred from [facility] to [facility] for hyperglycemia on insulin gtt. Pt admits to alcohol consumption however amount consumed inconsistent. Family reports history of ETOH abuse but unclear if pt has stopped. Per chart, sister reports pt has been experiencing confusion but she is unsure of much of his history.       Subjective 18:  Overnight pt was noted to have 2 episodes of black tarry stools - protonix BID initiated. Yesterday morning, pt started to exhibit signs of agitation (attempting to climb out of bed and leading to a fall). GI recommended to start Lactulose and Xifaxan. S/p paracentesis with removal of 1L dark yellow fluid. Glucose levels remain elevated overnight. Afebrile and hemodynamically stable.        Medication Review:  · Pressors - N/A  · Sedation - Ativan PRN (per CIWA protocol)  · Antibiotics - N/A  · Pain - N/A  · GI/ DVT - GI Protonix BID  · Others - N/A    Safety Bundles: Glycemic Control     Vital Signs:    Visit Vitals    BP 92/56 (BP 1 Location: Right arm, BP Patient Position: At rest)    Pulse 96    Temp 98.6 °F (37 °C)    Resp 18    Ht 5' 11\" (1.803 m)    Wt 89.4 kg (197 lb 1.5 oz)    SpO2 100%    BMI 27.49 kg/m2       O2 Device: Room air       Temp (24hrs), Av.2 °F (36.8 °C), Min:97.3 °F (36.3 °C), Max:98.8 °F (37.1 °C)       Intake/Output:   Last shift:         Last 3 shifts:  1901 - 06/06 0700  In: 545 [P.O.:240; I.V.:305]  Out: 2985 [Urine:1985]    Intake/Output Summary (Last 24 hours) at 06/06/18 6280  Last data filed at 06/06/18 0100   Gross per 24 hour   Intake           424.95 ml   Output             2385 ml   Net         -1960.05 ml        Physical Exam:  General:  Alert, cooperative, no distress, jaundiced; appears older than stated age. Head:  Normocephalic, without obvious abnormality, atraumatic. Eyes:  + icteric sclerae; pink palpebral conjunctivae; EOMs intact. Nose: Nares normal. No drainage or sinus tenderness. Throat: Lips, mucosa, and tongue mildly dry   Neck: Supple, symmetrical, trachea midline   Lungs:  Clear to auscultation bilaterally. CV:  S1, S2 present; regular rate and rhythm  GI:  Abdomen soft, distended, non-tender; (+) active bowel sounds  Extremities:  +2 pulses on all extremities; no cyanosis; + b/l pitting edema. Skin:  + multiple petechiae on torso, UE and LE extremities; multiple bruising to b/l UE; +jaundice;    + onychomycosis of all fingernails; + amputation to R 3rd toe with sutures in place  Neurologic:  Easily awakened with verbal stimulation, intermittent confusion/ agitation  Devices:  No NGT/OGT, Central line/ PICC, ETT/tracheostomy, chest tube.       DATA:     Current Facility-Administered Medications   Medication Dose Route Frequency    pantoprazole (PROTONIX) 40 mg in sodium chloride 0.9% 10 mL injection  40 mg IntraVENous Q12H    potassium phosphate 21 mmol in 0.9% sodium chloride 250 mL infusion   IntraVENous ONCE    sodium chloride (NS) flush 5-10 mL  5-10 mL IntraVENous Q8H    sodium chloride (NS) flush 5-10 mL  5-10 mL IntraVENous PRN    LORazepam (ATIVAN) tablet 1 mg  1 mg Oral Q1H PRN    Or    LORazepam (ATIVAN) injection 1 mg  1 mg IntraVENous Q1H PRN    LORazepam (ATIVAN) tablet 2 mg  2 mg Oral Q1H PRN    Or    LORazepam (ATIVAN) injection 2 mg  2 mg IntraVENous Q1H PRN    LORazepam (ATIVAN) injection 3 mg  3 mg IntraVENous Q15MIN PRN    thiamine (B-1) 400 mg in 0.9% sodium chloride 50 mL IVPB  400 mg IntraVENous TID    [START ON 6/8/2018] thiamine (B-1) 200 mg in 0.9% sodium chloride 50 mL IVPB  200 mg IntraVENous DAILY    ondansetron (ZOFRAN) injection 4 mg  4 mg IntraVENous R8P PRN    folic acid (FOLVITE) 5 mg/mL injection 1 mg  1 mg IntraVENous DAILY    insulin lispro (HUMALOG) injection   SubCUTAneous Q6H    sodium chloride (NS) flush 5-10 mL  5-10 mL IntraVENous Q8H    sodium chloride (NS) flush 5-10 mL  5-10 mL IntraVENous PRN    insulin glargine (LANTUS) injection 10 Units  10 Units SubCUTAneous QHS    albumin human 25% (BUMINATE) solution 12.5 g  12.5 g IntraVENous Q6H    rifAXIMin (XIFAXAN) tablet 550 mg  550 mg Oral BID    lactulose (CHRONULAC) solution 20 g  30 mL Oral BID    glucose chewable tablet 16 g  4 Tab Oral PRN    glucagon (GLUCAGEN) injection 1 mg  1 mg IntraMUSCular PRN    dextrose (D50W) injection syrg 12.5-25 g  25-50 mL IntraVENous PRN         Labs: Results:       Chemistry Recent Labs      06/06/18   0420  06/05/18   2330  06/05/18   0520   GLU  104*  224*  282*   NA  128*  123*  121*   K  3.2*  3.3*  4.0   CL  89*  84*  80*   CO2  29  28  26   BUN  22*  26*  29*   CREA  1.06  1.13  1.19   CA  8.8  8.2*  7.9*   AGAP  10  11  15   BUCR  21*  23*  24*   AP  98  92  95   TP  5.7*  5.4*  5.3*   ALB  2.6*  2.4*  2.0*   GLOB  3.1  3.0  3.3   AGRAT  0.8  0.8  0.6*      CBC w/Diff Recent Labs      06/06/18   0420  06/05/18   0130   WBC  5.0  9.3   RBC  2.20*  2.48*   HGB  8.0*  8.9*   HCT  21.1*  23.9*   PLT  38*  52*   GRANS  68  65   LYMPH  15*  15*   EOS  1  0      Coagulation Recent Labs      06/05/18   0520   PTP  33.9*   INR  3.5*   APTT  43.8*       Liver Enzymes Recent Labs      06/06/18   0420   TP  5.7*   ALB  2.6*   AP  98   SGOT  155*      ABG No results found for: PH, PHI, PCO2, PCO2I, PO2, PO2I, HCO3, HCO3I, FIO2, FIO2I   Microbiology Recent Labs      06/05/18   0430  06/05/18   0130 CULT  MRSA target DNA is detected (presumptive positive for MRSA colonization). *  MRSA CALLED TO AND CORRECTLY REPEATED BY:  Simona Dougherty RN CCU 1009 TO I.T.     NO GROWTH AFTER 7 HOURS  NO GROWTH AFTER 7 HOURS          Telemetry: [x]Sinus []A-flutter []Paced    []A-fib []Multiple PVCs                      IMPRESSION:   · Ascites, b/l LE edema, suspicion for melena all point to evidence of portal hypertension in pt with strong hx of ETOH abuse  · Acute encephalopathy - possible ETOH withdrawal   · Hyperglycemia, new diagnosis of DM with Hgb A1c 7.2  · Elevated bilirubin/ jaundice, thrombocytopenia, coagulopathy with anasarca, ascites, b/l LE edema  · Anemia, no active gross bleeding  · Hypoalbuminemia- improving  · Chronic hyponatremia- improving  · hx ETOH abuse, hemochromatosis, liver dysfunction, ascites  · Hx osteomyelitis, right 3rd toe  · +MRSA colonization  · Electrolyte derangements - hypokalemia, hypophosphatemia      PLAN:   · Resp - stable on room air. Oxygen supplementation as needed, titrate for SpO2 goal at least 90%  · ID - follow blood cultures (negative to date). Monitor for signs of infection   · CVS - hemodynamically stable   · Heme/Onc- stable H/H; trend platelets. Monitor for signs of active bleeding  · Metabolic - replace KPhos and repeat levels post-replacement. · Renal - no huerta - monitor renal indices closely. Continue fluid restriction. Nephrology following. · Endocrine - frequent glycemic checks. Consider increasing lantus dose in addition to using very resistant insulin scale for glycemic coverage. Add FT4 to am lab  · Neuro/ Pain/ Sedation - continue monitor for ETOH withdrawal using CIWA protocol - caution with ativan. Thiamine, folate supplementation   · GI - regular diet as ordered. Strict aspiration precautions. GI recommendation to start furosemide and spironolactone once he more stable.    · Prophylaxis - DVT (N/A), GI (Protonix)  · Full Code          Earlis Lesches, DAVID, 06/06/18

## 2018-06-06 NOTE — PROGRESS NOTES
Problem: Falls - Risk of  Goal: *Absence of Falls  Document Ulysses Fall Risk and appropriate interventions in the flowsheet. Outcome: Progressing Towards Goal  Fall Risk Interventions:  Mobility Interventions: Assess mobility with egress test, Bed/chair exit alarm    Mentation Interventions: Adequate sleep, hydration, pain control, Bed/chair exit alarm, Door open when patient unattended, Eyeglasses and hearing aids, Familiar objects from home, Family/sitter at bedside, Increase mobility, More frequent rounding, Reorient patient, Room close to nurse's station    Medication Interventions: Assess postural VS orthostatic hypotension, Bed/chair exit alarm, Patient to call before getting OOB, Teach patient to arise slowly, Utilize gait belt for transfers/ambulation    Elimination Interventions: Bed/chair exit alarm, Call light in reach, Patient to call for help with toileting needs, Toilet paper/wipes in reach, Urinal in reach    History of Falls Interventions: Bed/chair exit alarm, Consult care management for discharge planning, Door open when patient unattended, Evaluate medications/consider consulting pharmacy, Room close to nurse's station        Problem: Pressure Injury - Risk of  Goal: *Prevention of pressure injury  Document Santiago Scale and appropriate interventions in the flowsheet. Outcome: Progressing Towards Goal  Pressure Injury Interventions:  Sensory Interventions: Assess changes in LOC, Check visual cues for pain, Discuss PT/OT consult with provider, Float heels, Keep linens dry and wrinkle-free, Maintain/enhance activity level, Minimize linen layers, Monitor skin under medical devices, Pad between skin to skin, Pressure redistribution bed/mattress (bed type), Turn and reposition approx.  every two hours (pillows and wedges if needed)    Moisture Interventions: Absorbent underpads, Apply protective barrier, creams and emollients, Assess need for specialty bed, Check for incontinence Q2 hours and as needed, Maintain skin hydration (lotion/cream), Minimize layers, Moisture barrier    Activity Interventions: Pressure redistribution bed/mattress(bed type)    Mobility Interventions: HOB 30 degrees or less, Pressure redistribution bed/mattress (bed type), PT/OT evaluation, Turn and reposition approx.  every two hours(pillow and wedges)    Nutrition Interventions: Document food/fluid/supplement intake, Discuss nutritional consult with provider, Offer support with meals,snacks and hydration    Friction and Shear Interventions: Apply protective barrier, creams and emollients, Foam dressings/transparent film/skin sealants, HOB 30 degrees or less, Minimize layers, Lift sheet

## 2018-06-06 NOTE — ROUTINE PROCESS
TRANSFER - OUT REPORT:    Verbal report given to Nurse Aaron STEVENS(name) on Fleet Ahumada  being transferred to [facility], RN  (unit) for routine progression of care       Report consisted of patients Situation, Background, Assessment and   Recommendations(SBAR). Information from the following report(s) SBAR, Kardex, ED Summary and Procedure Summary was reviewed with the receiving nurse. Lines:   Venous Access Device mediport Upper chest (subclavicular area, right (Active)   Central Line Being Utilized Yes 6/6/2018  8:00 AM   Criteria for Appropriate Use Limited/no vessel suitable for conventional peripheral access 6/6/2018  8:00 AM   Site Assessment Clean, dry, & intact 6/6/2018  8:00 AM   Date of Last Dressing Change 06/04/18 6/6/2018  8:00 AM   Dressing Status Clean, dry, & intact 6/6/2018  8:00 AM   Dressing Type Bacteriocidal;Disk with Chlorhexadine gluconate (CHG); Tape;Transparent 6/6/2018  8:00 AM   Action Taken Blood drawn 6/6/2018  8:00 AM   Positive Blood Return (Medial Site) Yes 6/5/2018  8:00 PM   Action Taken (Medial Site) Blood drawn;Flushed 6/5/2018  8:00 PM   Alcohol Cap Used Yes 6/6/2018  8:00 AM        Opportunity for questions and clarification was provided.       Patient transported with:   personal belongings, family

## 2018-06-06 NOTE — PROGRESS NOTES
*ATTENTION: This note has been created by a medical student for educational purposes only. Please do not refer to the content of this note for clinical decision-making, billing, or other purposes. Please see attending physicians note to obtain clinical information on this patient. [address] [facility]  ICU Progress Note      Name: Darian Gavin   : 1960   MRN: [id number]   Date: 2018 1:19 AM   [x]I have reviewed the flowsheet and previous days notes. Events overnight reviewed and discussed with nursing staff. Vital signs and records reviewed. HPI:  Pt is a 62 y. o.male with PMHx of alcoholic cirrhosis, hemochromatosis, and seizures. No history of diabetes however, blood glucose elevated upon admission and pt was briefly on insulin. Currently has order for 10 units of Lantus insulin nightly and correctional Lispro. He was transferred from [address] [place] [address] due to AMS, DKA, nausea and ascites. Pt admits to alcohol consumption however amount consumed inconsistent. Family reports history of ETOH abuse but unclear if pt has stopped. Per chart, sister reports pt has been experiencing confusion but she is unsure of much of his history.       Subjective 18:  Lactulose and Xifaxan started for hepatic encephalopathy. Elevated blood glucose persisting. Advanced to very resistant Lispro scale. Paracentesis w/ IR, fluid removal (1L) and analysis performed. Episode of black stool on bed sheet pads. ROS:A comprehensive review of systems was negative except for that written in the HPI.     Medication Review:  · Pressors - N/A  · Sedation - Ativan PRN  · Antibiotics - N/A  · Pain - N/A  · GI/ DVT - GI Protonix  · Others - N/A    Safety Bundles: Glycemic Control / Electrolyte Replacement Protocol    Vital Signs:    Visit Vitals    BP 95/55 (BP 1 Location: Right arm, BP Patient Position: At rest)    Pulse 92    Temp 98.6 °F (37 °C)    Resp 15    Ht 5' 11\" (1.803 m)    Wt 86 kg (189 lb 9.5 oz)    SpO2 99%    BMI 26.44 kg/m2       O2 Device: Room air       Temp (24hrs), Av °F (36.7 °C), Min:97.3 °F (36.3 °C), Max:98.8 °F (37.1 °C)       Intake/Output:   Last shift:      1901 -  07  In: -   Out: 125 [Urine:125]  Last 3 shifts: 701 - 1900  In: 401 [P.O.:240; I.V.:305]  Out: 2860 [Urine:1860]    Intake/Output Summary (Last 24 hours) at 18 0119  Last data filed at 18   Gross per 24 hour   Intake           544.95 ml   Output             2685 ml   Net         -2140.05 ml        Physical Exam:  General:  Alert, cooperative, no distress, jaundiced; appears older than stated age. Head:  Normocephalic, without obvious abnormality, atraumatic. Eyes:  + icteric sclerae; pink palpebral conjunctivae; EOMs intact. Nose: Nares normal. No drainage or sinus tenderness. Throat: Lips, mucosa, and tongue mildly dry   Neck: Supple, symmetrical, trachea midline   Lungs:  Clear to auscultation bilaterally. CV:  S1, S2 present; regular rate and rhythm  GI:  Abdomen soft, distended, non-tender; (+) active bowel sounds  Extremities:  +2 pulses on all extremities; no cyanosis; + b/l pitting edema. Skin:  + multiple petechiae on torso, E and LE extremities; multiple bruising to b/l UE; +U jaundice;    + onychomycosis of all fingernails; + amputation to R 3rd toe with sutures in place  Neurologic:  Non-focal.  Devices:  No NGT/OGT, Central line/ PICC, ETT/tracheostomy, chest tube.       DATA:     Current Facility-Administered Medications   Medication Dose Route Frequency    sodium chloride (NS) flush 5-10 mL  5-10 mL IntraVENous Q8H    sodium chloride (NS) flush 5-10 mL  5-10 mL IntraVENous PRN    LORazepam (ATIVAN) tablet 1 mg  1 mg Oral Q1H PRN    Or    LORazepam (ATIVAN) injection 1 mg  1 mg IntraVENous Q1H PRN    LORazepam (ATIVAN) tablet 2 mg  2 mg Oral Q1H PRN    Or    LORazepam (ATIVAN) injection 2 mg  2 mg IntraVENous Q1H PRN    LORazepam (ATIVAN) injection 3 mg  3 mg IntraVENous Q15MIN PRN    thiamine (B-1) 400 mg in 0.9% sodium chloride 50 mL IVPB  400 mg IntraVENous TID    [START ON 6/8/2018] thiamine (B-1) 200 mg in 0.9% sodium chloride 50 mL IVPB  200 mg IntraVENous DAILY    ondansetron (ZOFRAN) injection 4 mg  4 mg IntraVENous W6F PRN    folic acid (FOLVITE) 5 mg/mL injection 1 mg  1 mg IntraVENous DAILY    insulin lispro (HUMALOG) injection   SubCUTAneous Q6H    sodium chloride (NS) flush 5-10 mL  5-10 mL IntraVENous Q8H    sodium chloride (NS) flush 5-10 mL  5-10 mL IntraVENous PRN    insulin glargine (LANTUS) injection 10 Units  10 Units SubCUTAneous QHS    albumin human 25% (BUMINATE) solution 12.5 g  12.5 g IntraVENous Q6H    rifAXIMin (XIFAXAN) tablet 550 mg  550 mg Oral BID    lactulose (CHRONULAC) solution 20 g  30 mL Oral BID    famotidine (PEPCID) tablet 20 mg  20 mg Oral BID    glucose chewable tablet 16 g  4 Tab Oral PRN    glucagon (GLUCAGEN) injection 1 mg  1 mg IntraMUSCular PRN    dextrose (D50W) injection syrg 12.5-25 g  25-50 mL IntraVENous PRN         Labs: Results:       Chemistry Recent Labs      06/05/18   2330  06/05/18   0520  06/05/18   0130   GLU  224*  282*  250*   NA  123*  121*  121*   K  3.3*  4.0  4.0   CL  84*  80*  81*   CO2  28  26  25   BUN  26*  29*  27*   CREA  1.13  1.19  1.30   CA  8.2*  7.9*  8.0*   AGAP  11  15  15   BUCR  23*  24*  21*   AP  92  95  95   TP  5.4*  5.3*  5.6*   ALB  2.4*  2.0*  2.0*   GLOB  3.0  3.3  3.6   AGRAT  0.8  0.6*  0.6*      CBC w/Diff Recent Labs      06/05/18   0130   WBC  9.3   RBC  2.48*   HGB  8.9*   HCT  23.9*   PLT  52*   GRANS  65   LYMPH  15*   EOS  0      Coagulation Recent Labs      06/05/18   0520   PTP  33.9*   INR  3.5*   APTT  43.8*       Liver Enzymes Recent Labs      06/05/18   2330   TP  5.4*   ALB  2.4*   AP  92   SGOT  148*      ABG No results found for: PH, PHI, PCO2, PCO2I, PO2, PO2I, HCO3, HCO3I, FIO2, FIO2I   Microbiology Recent Labs      06/05/18 0430  06/05/18   0130   CULT  MRSA target DNA is detected (presumptive positive for MRSA colonization). *  MRSA CALLED TO AND CORRECTLY REPEATED BY:  Ken Gage RN CCU 1009 TO I.T.     NO GROWTH AFTER 7 HOURS  NO GROWTH AFTER 7 HOURS          Telemetry: [x]Sinus []A-flutter []Paced    []A-fib []Multiple PVCs                      IMPRESSION:   Hepatic encephalopathy with of hx ETOH abuse, hemochromatosis, liver dysfunction, ascites  Hyperglycemia, no hx DM, Hgb A1c 7.2; persisting- advanced to very resistant Lispro scale. Elevated bilirubin/ jaundice, thrombocytopenia, coagulopathy with anasarca, ascites, b/l LE edema. Anemia, no active gross bleeding. Hypoalbuminemia- improving. Chronic hyponatremia- improving. Hx osteomyelitis, right 3rd toe  +MRSA colonization. PLAN:   Resp - stable on room air. Oxygen supplementation as needed, titrate for SpO2 goal at least 90%  ID - Blood cultures (-) preliminary results. Trend temperature curve, WBC/ bands. Hold off on antibiotics unless signs and symptoms for infectious process are observed  CVS - hemodynamically stable although SBP at/near 100 at times. ? echo if HD become a concern. Heme/Onc- obtain iron profile, Vit B12, folate, PT/PTT. Trend H/H & platelets and transfuse PRN. Monitor for signs of active bleeding  Metabolic - trend electrolytes; replace Phos IV. Correct hyperglycemia & hypoNa should improve. Renal - no huerta - monitor renal indices closely. Con't fluid restriction. Nephrology following. Endocrine - frequent glycemic checks. Agree with very resistant Lispro scale - avoid hypoglycemia. Neuro/ Pain/ Sedation -monitor for ETOH withdrawal using CIWA protocol - caution with ativan. Thiamine, folate supplementation   GI - NPO. Strict aspiration precautions. Trend LFTs, bilirubin; RUQ US completed see note. Paracentesis completed - follow for results. Will need to start furosemide and spironolactone once he is more stable.  GI following. Prophylaxis - DVT (N/A), GI (Protonix)  Full Code.             The patient is: [x] acutely ill Risk of deterioration: [x] moderate    [] critically ill  [] high     My assessment/plan was discussed with:  [x]nursing []PT/OT    []respiratory therapy [x]January Debbie Muro PA-C   []family []       BARTOLOME Joseph  06/06/18  3:26 AM

## 2018-06-06 NOTE — PROGRESS NOTES
Pt noted to have black stool on pad and on bed sheets. No overt blood noted. Britni Hollis PAC at bedside. New orders received.

## 2018-06-06 NOTE — ROUTINE PROCESS
Assumed patient care. Received bedside shift report from Ed Lee. Patient in bed, awake, alert and oriented x3. Patient is off restraints, per off going shift RN Alcon Lara, patient was taken off restraints upon arrival to room. Denies pain or discomforts at this time. Call light placed within reach. Bed in low position, bed alarm turned on for safety. Emptied urinal. Personal items placed in reach. 5:35 AM - Kept patient NPO after midnight for EGD today pending lab results. Patient notified and sister Emory Garcia, agrees with the plans. 7:32 AM - Bedside shift change report given to Monalisa Patel (oncoming nurse) by Carlin Severe RN (offgoing nurse). Report given with SBAR, Kardex, Intake/Output, MAR and Recent Results.

## 2018-06-06 NOTE — PROGRESS NOTES
attended the interdisciplinary rounds for Kain Ferreira, who is a 62 y. o.,male. Patients Primary Language is: [place]. According to the patients EMR Latter day Affiliation is: No preference. The reason the Patient came to the hospital is:   Patient Active Problem List    Diagnosis Date Noted    Type 2 diabetes mellitus with hyperosmolarity ([facility] 61/34/8413    Alcoholic cirrhosis ([facility] 87/11/4111    Alcohol withdrawal syndrome, with delirium ([facility] 06/05/2018    Hyponatremia 06/05/2018    Nausea 06/05/2018    Other hemochromatosis 06/05/2018    Weakness generalized 06/05/2018    Ascites due to alcoholic hepatitis 38/45/0351    Thrombocytopenia ([facility] 06/05/2018      Plan:  Chaplains will continue to follow and will provide pastoral care on an as needed/requested basis.  recommends bedside caregivers page  on duty if patient shows signs of acute spiritual or emotional distress.     [address]  Board Certified [address] [place] [address]   [phone]

## 2018-06-06 NOTE — PROGRESS NOTES
[email or web address]  [phone]    Gastroenterology follow up-Progress note    Impression:  1. Decompensated alcoholic cirrhosis  2. Ascites s/p paracentesis, fluid neg  3. New onset melena, ? Esophageal varices as source of blood, Last EGD over a year ago, has been followed by GI in [place]  4. Hemochromatosis, no phlebotomy X 1 yr    Plan:  1. Monitor for SBP  2. Will plan for EGD tomorrow if platelets above 50, will recheck CBC and INR  3. Continue xifaxan and lactulose  4. Start furosemide and spironolactone when stable    Stressed importance of ETOH cessation with patient and sister, behavior is life threatening if he does not stop. Cannot be transplant candidate without ETOH cessation for at least 6 months. Chief Complaint: Decompensated alcoholic cirrhosis      Subjective:  Fatigue, denies abdominal pain but notes remote hx gastric ulcers    ROS: Denies any fevers, chills, rash.      Eyes: conjunctiva normal, EOM normal   Neck: ROM normal, supple and trachea normal   Cardiovascular: heart normal, intact distal pulses, normal rate and regular rhythm   Pulmonary/Chest Wall: breath sounds normal and effort normal   Abdominal: appearance normal, bowel sounds normal and soft, non-acute, non-tender     Patient Active Problem List   Diagnosis Code    Type 2 diabetes mellitus with hyperosmolarity (HCC) Z79.68    Alcoholic cirrhosis (HCC) D11.24    Alcohol withdrawal syndrome, with delirium ([facility] [address] [facility] F10.231    Hyponatremia E87.1    Nausea R11.0    Other hemochromatosis E83.118    Weakness generalized R53.1    Ascites due to alcoholic hepatitis V21.17    Thrombocytopenia (HCC) D69.6         Visit Vitals    BP (!) 72/53    Pulse 98    Temp 98.7 °F (37.1 °C)    Resp 17    Ht 5' 11\" (1.803 m)    Wt 89.4 kg (197 lb 1.5 oz)    SpO2 91%    BMI 27.49 kg/m2           Intake/Output Summary (Last 24 hours) at 06/06/18 1125  Last data filed at 06/06/18 2071   Gross per 24 hour   Intake           600.02 ml   Output 1925 ml   Net         -1324.98 ml       CBC w/Diff    Lab Results   Component Value Date/Time    WBC 5.0 06/06/2018 04:20 AM    RBC 2.20 (L) 06/06/2018 04:20 AM    HGB 8.0 (L) 06/06/2018 04:20 AM    HCT 21.1 (L) 06/06/2018 04:20 AM    MCV 95.9 06/06/2018 04:20 AM    MCH 36.4 (H) 06/06/2018 04:20 AM    MCHC 37.9 (H) 06/06/2018 04:20 AM    RDW 16.1 (H) 06/06/2018 04:20 AM    PLT 38 (L) 06/06/2018 04:20 AM    Lab Results   Component Value Date/Time    GRANS 68 06/06/2018 04:20 AM    LYMPH 15 (L) 06/06/2018 04:20 AM    EOS 1 06/06/2018 04:20 AM    BANDS 10 (H) 06/06/2018 04:20 AM    BASOS 0 06/06/2018 04:20 AM    MYELO 1 (H) 06/05/2018 01:30 AM      Basic Metabolic Profile   Recent Labs      06/06/18   0420   NA  128*   K  3.2*   CL  89*   CO2  29   BUN  22*   CA  8.8   MG  2.4   PHOS  0.7*        Hepatic Function    Lab Results   Component Value Date/Time    ALB 2.6 (L) 06/06/2018 04:20 AM    TP 5.7 (L) 06/06/2018 04:20 AM    AP 98 06/06/2018 04:20 AM    Lab Results   Component Value Date/Time    SGOT 155 (H) 06/06/2018 04:20 AM          Coags   Recent Labs      06/05/18   0520   PTP  33.9*   INR  3.5*   APTT  43.8*               BENNIE Rogers    [facility]. [email or web address]  Phone: [phone]  Pager: [phone]

## 2018-06-06 NOTE — DIABETES MGMT
Diabetes Patient/Family Education Record    Factors That  May Influence Patients Ability  to Learn or  Comply with Recommendations   []   Language barrier    []   Cultural needs   []   Motivation    []   Cognitive limitation    []   Physical   []   Education    []   Physiological factors   []   Hearing/vision/speaking impairment   []   Hindu beliefs    []   Financial factors   []  Other:   [x]  No factors identified at this time.      Person Instructed:   [x]   Patient   [x]   Family   []  Other     Preference for Learning:   [x]   Verbal   [x]   Written   []  Demonstration     Level of Comprehension & Competence:    []  Good                                      [] Fair                                     []  Poor                             [x]  Needs Reinforcement   [x]  Teachback completed    Education Component: pt newly diagnosed, began diabetes education, pt being transferred to another unit, will follow up   []  Medication management, including how to administer insulin (if appropriate) and potential medication interactions    [x]  Nutritional management    []  Exercise   []  Signs, symptoms, and treatment of hyperglycemia and hypoglycemia   [] Prevention, recognition and treatment of hyperglycemia and hypoglycemia   []  Importance of blood glucose monitoring and how to obtain a blood glucose meter    []  Instruction on use of the blood glucose meter   [x]  Discuss the importance of HbA1C monitoring    []  Sick day guidelines   []  Proper use and disposal of lancets, needles, syringes or insulin pens (if appropriate)   []  Potential long-term complications (retinopathy, kidney disease, neuropathy, foot care)   [] Information about whom to contact in case of emergency or for more information    [x]  Goal:  Patient/family will demonstrate understanding of Diabetes Self Management Skills by: 6/13/18  Plan for post-discharge education or self-management support:    [x] Outpatient class schedule provided            [] Patient Declined    [] Scheduled for outpatient classes (date) _______     Suzan Cummins RD, CDE  pgr [phone]

## 2018-06-06 NOTE — PROGRESS NOTES
NUTRITION    Nutrition Consult: General Nutrition Management & Supplements     RECOMMENDATIONS / PLAN:     - Add supplements: Glucerna Shake TID (to replace beverage at meal). - Continue RD inpatient monitoring and evaluation. NUTRITION INTERVENTIONS & DIAGNOSIS:     [x] Meals/snacks: modified composition  [x] Medical food supplement therapy: initiate   [x] Collaboration and referral of nutrition care: interdisciplinary rounds     Nutrition Diagnosis: Inadequate oral intake related to decreased appetite as evidenced by pt consuming 50% or less of recent meals. ASSESSMENT:     Pt with poor appetite and poor meal intake. S/p paracentesis yesterday, removed 1 L. Lactulose started, pt now with melena. Fluid restriction for hyponatremia, history of alcohol abuse. Average po intake adequate to meet patients estimated nutritional needs:   [] Yes     [x] No   [] Unable to determine at this time    Diet: DIET REGULAR FR 800ML; No Conc.  Sweets      Food Allergies: NKFA  Current Appetite:   [] Good     [] Fair     [x] Poor     [] Other:  Appetite/meal intake prior to admission:   [] Good     [] Fair     [] Poor     [x] Other: unknown  Feeding Limitations:  [] Swallowing difficulty    [] Chewing difficulty    [] Other:  Current Meal Intake: Patient Vitals for the past 100 hrs:   % Diet Eaten   06/06/18 0919 20 %     BM: 6/6, loose    Skin Integrity: abrasions to bilateral arms and knee   Edema: ascites; 1-2+ generalized, 1+ sacral, 1-2+ pitting LEs    Pertinent Medications: Reviewed: folic acid, thiamine, lactulose, zofran, 20 mmol K Phos    Recent Labs      06/06/18   0420  06/05/18   2330  06/05/18   0520  06/05/18   0130   NA  128*  123*  121*  121*   K  3.2*  3.3*  4.0  4.0   CL  89*  84*  80*  81*   CO2  29  28  26  25   GLU  104*  224*  282*  250*   BUN  22*  26*  29*  27*   CREA  1.06  1.13  1.19  1.30   CA  8.8  8.2*  7.9*  8.0*   MG  2.4   --    --   2.1   PHOS  0.7*   --    --   0.6*   ALB  2.6*  2.4* 2. 0*  2.0*   SGOT  155*  148*  144*  141*   ALT  73*  72*  73*  74*       Intake/Output Summary (Last 24 hours) at 06/06/18 1407  Last data filed at 06/06/18 1351   Gross per 24 hour   Intake           600.02 ml   Output             2005 ml   Net         -1404.98 ml       Anthropometrics:  Ht Readings from Last 1 Encounters:   06/04/18 5' 11\" (1.803 m)     Last 3 Recorded Weights in this Encounter    06/04/18 2300 06/06/18 0400   Weight: 86 kg (189 lb 9.5 oz) 89.4 kg (197 lb 1.5 oz)     Body mass index is 27.49 kg/(m^2). Weight History:   Weight Metrics 6/6/2018 6/4/2018   Weight 197 lb 1.5 oz -   BMI - 27.49 kg/m2        Admitting Diagnosis: symptomatic ascites  DKA (diabetic ketoacidoses) (HCC)  dx  Pertinent PMHx: alcohol abuse, liver cirrhosis, ascites, hemochromatosis, seizures     Education Needs:        [x] None identified  [] Identified - Not appropriate at this time  []  Identified and addressed - refer to education log  Learning Limitations:   [x] None identified  [] Identified    Cultural, Faith & ethnic food preferences:  [x] None identified    [] Identified and addressed     ESTIMATED NUTRITION NEEDS:     Calories: 1023-9939 kcal (MSJx1.2-1.3) based on  [x] Actual BW 89 kg     [] IBW   Protein:  gm (0.8-1.2 gm/kg) based on  [x] Actual BW      [] IBW   Fluid: 1 mL/kcal     MONITORING & EVALUATION:     Nutrition Goal(s):   1. Po intake of meals will meet >75% of patient estimated nutritional needs within the next 7 days.   Outcome:  [] Met/Ongoing    []  Not Met    [x] New/Initial Goal     Monitoring:   [x] Food and beverage intake   [x] Diet order   [x] Nutrition-focused physical findings   [x] Treatment/therapy   [] Weight   [] Enteral nutrition intake        Previous Recommendations (for follow-up assessments only):     []   Implemented       []   Not Implemented (RD to address)      [] No Longer Appropriate     [] No Recommendation Made     Discharge Planning: low sodium diet   [x] Participated in care planning, discharge planning, & interdisciplinary rounds as appropriate      Suzie Treadwell, [address] [place] [address], [address] [place]    Pager: [phone]

## 2018-06-06 NOTE — PROCEDURES
RADIOLOGY POST PARACENTESIS NOTE     June 6, 2018       12:05 PM     Preoperative Diagnosis:   Ascites    Postoperative Diagnosis:  Same. :  Ivy García PA-C    Assistant:  None. Type of Anesthesia: 1% plain lidocaine    Procedure/Description:  US-Guided paracentesis    Findings:  Using ultrasound guidance the largest pocket of peritoneal fluid was localized and marked at the right lower quadrant. The patient was prepped and draped in the usual fashion. 1% Lidocaine was infiltrated locally. A 5 Japanese over the needle catheter was advanced into the peritoneal cavity and dark yellow colored fluid was aspirated. Once fluid was easily aspirated, the needle was removed leaving the catheter in place. The catheter was connected to vacuum containers and 1 liter of ascitic fluid was removed.     Estimated blood Loss:  Minimal    Specimen Removed:   Yes    Complications: None    Condition: Stable    Discharge Plan:  continue present therapy    Ilana Alfaro

## 2018-06-06 NOTE — PROGRESS NOTES
SUBJECTIVE:    Feeling somewhat better today. Denies for chest and abdominal pain. No nausea or vomiting. Drinks alcohol but not the way her sister says per patient. OBJECTIVE:    Visit Vitals    BP 94/55    Pulse 90    Temp 97.7 °F (36.5 °C)    Resp 16    Ht 5' 11\" (1.803 m)    Wt 89.4 kg (197 lb 1.5 oz)    SpO2 95%    BMI 27.49 kg/m2     HEENT: no pallor. Oral mucosa is moist. Icterus present. Neck: no JVD  CVS: RRR  RS: CTA bilaterally, no wheezes  GI: Ascites present. Soft, BS +  Extremities; pedal edema, right third toe suture in situ   General: NAD, Awake  CNS: moves all extremities well. ASSESSMENT:    1. Worsening fatigue and nausea in setting of hx ETOH abuse, hemochromatosis, liver dysfunction, ascites due to hyponatremia, improving  2. Severe hyponatremia in setting of chronic hyponatremia due to liver cirrhosis and beer potomania  3. DM with A1c of 7.2.  4. Elevated bilirubin/ jaundice, thrombocytopenia, coagulopathy in setting of ETOH cirrhosis. 5. H/o hemochromatosis. 6. Ascites s/p paracentesis  7. Hx osteomyelitis, right 3rd toe  8.  Hypothyroidism     PLAN:    Cont current management  Replete K and phos  Add Synthroid and midodrine  PT/OT ordered  CXR ordered  Dietician to follow   Can be discharged in 1-2 days if remains stable     CMP:   Lab Results   Component Value Date/Time     (L) 06/06/2018 04:20 AM    K 3.2 (L) 06/06/2018 04:20 AM    CL 89 (L) 06/06/2018 04:20 AM    CO2 29 06/06/2018 04:20 AM    AGAP 10 06/06/2018 04:20 AM     (H) 06/06/2018 04:20 AM    BUN 22 (H) 06/06/2018 04:20 AM    CREA 1.06 06/06/2018 04:20 AM    GFRAA >60 06/06/2018 04:20 AM    GFRNA >60 06/06/2018 04:20 AM    CA 8.8 06/06/2018 04:20 AM    MG 2.4 06/06/2018 04:20 AM    PHOS 0.7 (L) 06/06/2018 04:20 AM    ALB 2.6 (L) 06/06/2018 04:20 AM    TP 5.7 (L) 06/06/2018 04:20 AM    GLOB 3.1 06/06/2018 04:20 AM    AGRAT 0.8 06/06/2018 04:20 AM    SGOT 155 (H) 06/06/2018 04:20 AM    ALT 73 (H) 06/06/2018 04:20 AM     CBC:   Lab Results   Component Value Date/Time    WBC 5.0 06/06/2018 04:20 AM    HGB 8.0 (L) 06/06/2018 04:20 AM    HCT 21.1 (L) 06/06/2018 04:20 AM    PLT 38 (L) 06/06/2018 04:20 AM

## 2018-06-06 NOTE — PROGRESS NOTES
RENAL DAILY PROGRESS NOTE    Patient: Roz Nicholas               Sex: male          DOA: 6/4/2018 10:34 PM        YOB: 1960      Age:  62 y.o.        LOS:  LOS: 2 days     Subjective:     Roz Nicholas is a 62 y.o.  who presents with symptomatic ascites  DKA (diabetic ketoacidoses) ([facility] [address] asked to evaluate for hyponatremia. admitted with cirrhosis ,ascites  Chief complains:  - Reviewed last 24 hrs events     Current Facility-Administered Medications   Medication Dose Route Frequency    pantoprazole (PROTONIX) 40 mg in sodium chloride 0.9% 10 mL injection  40 mg IntraVENous Q12H    potassium phosphate 21 mmol in 0.9% sodium chloride 250 mL infusion   IntraVENous ONCE    sodium chloride (NS) flush 5-10 mL  5-10 mL IntraVENous Q8H    sodium chloride (NS) flush 5-10 mL  5-10 mL IntraVENous PRN    LORazepam (ATIVAN) tablet 1 mg  1 mg Oral Q1H PRN    Or    LORazepam (ATIVAN) injection 1 mg  1 mg IntraVENous Q1H PRN    LORazepam (ATIVAN) tablet 2 mg  2 mg Oral Q1H PRN    Or    LORazepam (ATIVAN) injection 2 mg  2 mg IntraVENous Q1H PRN    LORazepam (ATIVAN) injection 3 mg  3 mg IntraVENous Q15MIN PRN    thiamine (B-1) 400 mg in 0.9% sodium chloride 50 mL IVPB  400 mg IntraVENous TID    [START ON 6/8/2018] thiamine (B-1) 200 mg in 0.9% sodium chloride 50 mL IVPB  200 mg IntraVENous DAILY    ondansetron (ZOFRAN) injection 4 mg  4 mg IntraVENous S4Q PRN    folic acid (FOLVITE) 5 mg/mL injection 1 mg  1 mg IntraVENous DAILY    insulin lispro (HUMALOG) injection   SubCUTAneous Q6H    sodium chloride (NS) flush 5-10 mL  5-10 mL IntraVENous Q8H    sodium chloride (NS) flush 5-10 mL  5-10 mL IntraVENous PRN    insulin glargine (LANTUS) injection 10 Units  10 Units SubCUTAneous QHS    albumin human 25% (BUMINATE) solution 12.5 g  12.5 g IntraVENous Q6H    rifAXIMin (XIFAXAN) tablet 550 mg  550 mg Oral BID    lactulose (CHRONULAC) solution 20 g  30 mL Oral BID    glucose chewable tablet 16 g  4 Tab Oral PRN    glucagon (GLUCAGEN) injection 1 mg  1 mg IntraMUSCular PRN    dextrose (D50W) injection syrg 12.5-25 g  25-50 mL IntraVENous PRN       Objective:     Visit Vitals    BP (!) 72/53    Pulse 98    Temp 98.7 °F (37.1 °C)    Resp 17    Ht 5' 11\" (1.803 m)    Wt 89.4 kg (197 lb 1.5 oz)    SpO2 91%    BMI 27.49 kg/m2       Intake/Output Summary (Last 24 hours) at 06/06/18 1114  Last data filed at 06/06/18 1904   Gross per 24 hour   Intake           600.02 ml   Output             2285 ml   Net         -1684.98 ml       Physical Examination:     GEN:lethargic  RS: Chest is bilateral equal,   abd ascites  Extremities: + edema, no cyanosis, skin is warm on touch      Data Review:      Labs:     Hematology: Recent Labs      06/06/18   0420  06/05/18   0130   WBC  5.0  9.3   HGB  8.0*  8.9*   HCT  21.1*  23.9*     Chemistry: Recent Labs      06/06/18   0420  06/05/18   2330  06/05/18   0520  06/05/18   0130   BUN  22*  26*  29*  27*   CREA  1.06  1.13  1.19  1.30   CA  8.8  8.2*  7.9*  8.0*   ALB  2.6*  2.4*  2.0*  2.0*   K  3.2*  3.3*  4.0  4.0   NA  128*  123*  121*  121*   CL  89*  84*  80*  81*   CO2  29  28  26  25   PHOS  0.7*   --    --   0.6*   GLU  104*  224*  282*  250*        Images:    XR (Most Recent). CXR reviewed by me and compared with previous CXR No results found for this or any previous visit. CT (Most Recent) No results found for this or any previous visit. EKG No results found for this or any previous visit. I have personally reviewed the old medical records and patient's labs    Plan / Recommendation:      1. Hyponatremia,unknown baseline,related to cirrhosis,alcohol,hyperglycemia. improving. continue fluid restrictions  2.hypokalemia,hypophosphatemia,replace    D/w Dr. Andrew Metcalf MD  Nephrology  6/6/2018

## 2018-06-07 ENCOUNTER — ANESTHESIA (OUTPATIENT)
Dept: ENDOSCOPY | Age: 58
DRG: 640 | End: 2018-06-07
Payer: COMMERCIAL

## 2018-06-07 LAB
ALBUMIN SERPL-MCNC: 2.9 G/DL (ref 3.4–5)
ALBUMIN/GLOB SERPL: 1 {RATIO} (ref 0.8–1.7)
ALP SERPL-CCNC: 115 U/L (ref 45–117)
ALT SERPL-CCNC: 74 U/L (ref 16–61)
AMMONIA PLAS-SCNC: 47 UMOL/L (ref 11–32)
ANION GAP SERPL CALC-SCNC: 11 MMOL/L (ref 3–18)
AST SERPL-CCNC: 159 U/L (ref 15–37)
BASOPHILS # BLD: 0 K/UL (ref 0–0.06)
BASOPHILS NFR BLD: 0 % (ref 0–2)
BILIRUB SERPL-MCNC: 8.1 MG/DL (ref 0.2–1)
BUN SERPL-MCNC: 19 MG/DL (ref 7–18)
BUN/CREAT SERPL: 17 (ref 12–20)
CALCIUM SERPL-MCNC: 8.8 MG/DL (ref 8.5–10.1)
CHLORIDE SERPL-SCNC: 90 MMOL/L (ref 100–108)
CO2 SERPL-SCNC: 27 MMOL/L (ref 21–32)
CREAT SERPL-MCNC: 1.1 MG/DL (ref 0.6–1.3)
DIFFERENTIAL METHOD BLD: ABNORMAL
EOSINOPHIL # BLD: 0 K/UL (ref 0–0.4)
EOSINOPHIL NFR BLD: 0 % (ref 0–5)
ERYTHROCYTE [DISTWIDTH] IN BLOOD BY AUTOMATED COUNT: 16.5 % (ref 11.6–14.5)
GLOBULIN SER CALC-MCNC: 2.9 G/DL (ref 2–4)
GLUCOSE BLD STRIP.AUTO-MCNC: 220 MG/DL (ref 70–110)
GLUCOSE BLD STRIP.AUTO-MCNC: 256 MG/DL (ref 70–110)
GLUCOSE BLD STRIP.AUTO-MCNC: 275 MG/DL (ref 70–110)
GLUCOSE BLD STRIP.AUTO-MCNC: 308 MG/DL (ref 70–110)
GLUCOSE SERPL-MCNC: 216 MG/DL (ref 74–99)
HCT VFR BLD AUTO: 22.8 % (ref 36–48)
HGB BLD-MCNC: 8.2 G/DL (ref 13–16)
INR PPP: 2.6 (ref 0.8–1.2)
LYMPHOCYTES # BLD: 1.1 K/UL (ref 0.9–3.6)
LYMPHOCYTES NFR BLD: 22 % (ref 21–52)
MCH RBC QN AUTO: 36.1 PG (ref 24–34)
MCHC RBC AUTO-ENTMCNC: 36 G/DL (ref 31–37)
MCV RBC AUTO: 100.4 FL (ref 74–97)
MONOCYTES # BLD: 0.7 K/UL (ref 0.05–1.2)
MONOCYTES NFR BLD: 15 % (ref 3–10)
NEUTS SEG # BLD: 3 K/UL (ref 1.8–8)
NEUTS SEG NFR BLD: 63 % (ref 40–73)
PHOSPHATE SERPL-MCNC: 1.3 MG/DL (ref 2.5–4.9)
PLATELET # BLD AUTO: 46 K/UL (ref 135–420)
PMV BLD AUTO: 10.7 FL (ref 9.2–11.8)
POTASSIUM SERPL-SCNC: 3.6 MMOL/L (ref 3.5–5.5)
PROT SERPL-MCNC: 5.8 G/DL (ref 6.4–8.2)
PROTHROMBIN TIME: 26.9 SEC (ref 11.5–15.2)
RBC # BLD AUTO: 2.27 M/UL (ref 4.7–5.5)
SODIUM SERPL-SCNC: 128 MMOL/L (ref 136–145)
WBC # BLD AUTO: 4.8 K/UL (ref 4.6–13.2)

## 2018-06-07 PROCEDURE — P9047 ALBUMIN (HUMAN), 25%, 50ML: HCPCS | Performed by: PHYSICIAN ASSISTANT

## 2018-06-07 PROCEDURE — 74011636637 HC RX REV CODE- 636/637: Performed by: NURSE ANESTHETIST, CERTIFIED REGISTERED

## 2018-06-07 PROCEDURE — 77030018846 HC SOL IRR STRL H20 ICUM -A: Performed by: INTERNAL MEDICINE

## 2018-06-07 PROCEDURE — 74011250636 HC RX REV CODE- 250/636: Performed by: INTERNAL MEDICINE

## 2018-06-07 PROCEDURE — 74011000258 HC RX REV CODE- 258: Performed by: INTERNAL MEDICINE

## 2018-06-07 PROCEDURE — 77030037878 HC DRSG MEPILEX >48IN BORD MOLN -B

## 2018-06-07 PROCEDURE — 74011250636 HC RX REV CODE- 250/636: Performed by: PHYSICIAN ASSISTANT

## 2018-06-07 PROCEDURE — 85610 PROTHROMBIN TIME: CPT | Performed by: INTERNAL MEDICINE

## 2018-06-07 PROCEDURE — 76040000019: Performed by: INTERNAL MEDICINE

## 2018-06-07 PROCEDURE — 74011636637 HC RX REV CODE- 636/637: Performed by: INTERNAL MEDICINE

## 2018-06-07 PROCEDURE — 74011250637 HC RX REV CODE- 250/637: Performed by: INTERNAL MEDICINE

## 2018-06-07 PROCEDURE — 82962 GLUCOSE BLOOD TEST: CPT

## 2018-06-07 PROCEDURE — 74011250636 HC RX REV CODE- 250/636: Performed by: NURSE ANESTHETIST, CERTIFIED REGISTERED

## 2018-06-07 PROCEDURE — 36415 COLL VENOUS BLD VENIPUNCTURE: CPT | Performed by: INTERNAL MEDICINE

## 2018-06-07 PROCEDURE — 82140 ASSAY OF AMMONIA: CPT | Performed by: INTERNAL MEDICINE

## 2018-06-07 PROCEDURE — 74011250637 HC RX REV CODE- 250/637: Performed by: PHYSICIAN ASSISTANT

## 2018-06-07 PROCEDURE — 65270000029 HC RM PRIVATE

## 2018-06-07 PROCEDURE — 76060000031 HC ANESTHESIA FIRST 0.5 HR: Performed by: INTERNAL MEDICINE

## 2018-06-07 PROCEDURE — 77030008565 HC TBNG SUC IRR ERBE -B: Performed by: INTERNAL MEDICINE

## 2018-06-07 PROCEDURE — 77030010857 HC CATH PRB GLD BSC -C: Performed by: INTERNAL MEDICINE

## 2018-06-07 PROCEDURE — 85025 COMPLETE CBC W/AUTO DIFF WBC: CPT | Performed by: INTERNAL MEDICINE

## 2018-06-07 PROCEDURE — 84100 ASSAY OF PHOSPHORUS: CPT | Performed by: INTERNAL MEDICINE

## 2018-06-07 PROCEDURE — 0W3P8ZZ CONTROL BLEEDING IN GASTROINTESTINAL TRACT, VIA NATURAL OR ARTIFICIAL OPENING ENDOSCOPIC: ICD-10-PCS | Performed by: INTERNAL MEDICINE

## 2018-06-07 PROCEDURE — 80053 COMPREHEN METABOLIC PANEL: CPT | Performed by: INTERNAL MEDICINE

## 2018-06-07 PROCEDURE — 74011000250 HC RX REV CODE- 250: Performed by: INTERNAL MEDICINE

## 2018-06-07 PROCEDURE — 74011000250 HC RX REV CODE- 250: Performed by: NURSE ANESTHETIST, CERTIFIED REGISTERED

## 2018-06-07 PROCEDURE — 74011250636 HC RX REV CODE- 250/636

## 2018-06-07 RX ORDER — INSULIN LISPRO 100 [IU]/ML
INJECTION, SOLUTION INTRAVENOUS; SUBCUTANEOUS ONCE
Status: DISCONTINUED | OUTPATIENT
Start: 2018-06-07 | End: 2018-06-07 | Stop reason: HOSPADM

## 2018-06-07 RX ORDER — PROPOFOL 10 MG/ML
INJECTION, EMULSION INTRAVENOUS AS NEEDED
Status: DISCONTINUED | OUTPATIENT
Start: 2018-06-07 | End: 2018-06-07 | Stop reason: HOSPADM

## 2018-06-07 RX ORDER — DEXTROSE 50 % IN WATER (D50W) INTRAVENOUS SYRINGE
25-50 AS NEEDED
Status: DISCONTINUED | OUTPATIENT
Start: 2018-06-07 | End: 2018-06-07 | Stop reason: HOSPADM

## 2018-06-07 RX ORDER — MAGNESIUM SULFATE 100 %
4 CRYSTALS MISCELLANEOUS AS NEEDED
Status: DISCONTINUED | OUTPATIENT
Start: 2018-06-07 | End: 2018-06-07 | Stop reason: HOSPADM

## 2018-06-07 RX ORDER — SODIUM CHLORIDE, SODIUM LACTATE, POTASSIUM CHLORIDE, CALCIUM CHLORIDE 600; 310; 30; 20 MG/100ML; MG/100ML; MG/100ML; MG/100ML
125 INJECTION, SOLUTION INTRAVENOUS CONTINUOUS
Status: DISCONTINUED | OUTPATIENT
Start: 2018-06-07 | End: 2018-06-07 | Stop reason: HOSPADM

## 2018-06-07 RX ORDER — FENTANYL CITRATE 50 UG/ML
50 INJECTION, SOLUTION INTRAMUSCULAR; INTRAVENOUS AS NEEDED
Status: DISCONTINUED | OUTPATIENT
Start: 2018-06-07 | End: 2018-06-07 | Stop reason: HOSPADM

## 2018-06-07 RX ORDER — SODIUM CHLORIDE 0.9 % (FLUSH) 0.9 %
5-10 SYRINGE (ML) INJECTION EVERY 8 HOURS
Status: DISCONTINUED | OUTPATIENT
Start: 2018-06-07 | End: 2018-06-07 | Stop reason: HOSPADM

## 2018-06-07 RX ORDER — SODIUM CHLORIDE 0.9 % (FLUSH) 0.9 %
5-10 SYRINGE (ML) INJECTION AS NEEDED
Status: DISCONTINUED | OUTPATIENT
Start: 2018-06-07 | End: 2018-06-07 | Stop reason: HOSPADM

## 2018-06-07 RX ORDER — SODIUM CHLORIDE, SODIUM LACTATE, POTASSIUM CHLORIDE, CALCIUM CHLORIDE 600; 310; 30; 20 MG/100ML; MG/100ML; MG/100ML; MG/100ML
INJECTION, SOLUTION INTRAVENOUS
Status: DISCONTINUED | OUTPATIENT
Start: 2018-06-07 | End: 2018-06-07 | Stop reason: HOSPADM

## 2018-06-07 RX ORDER — SODIUM CHLORIDE, SODIUM LACTATE, POTASSIUM CHLORIDE, CALCIUM CHLORIDE 600; 310; 30; 20 MG/100ML; MG/100ML; MG/100ML; MG/100ML
75 INJECTION, SOLUTION INTRAVENOUS CONTINUOUS
Status: DISCONTINUED | OUTPATIENT
Start: 2018-06-07 | End: 2018-06-07 | Stop reason: HOSPADM

## 2018-06-07 RX ORDER — INSULIN LISPRO 100 [IU]/ML
INJECTION, SOLUTION INTRAVENOUS; SUBCUTANEOUS ONCE
Status: COMPLETED | OUTPATIENT
Start: 2018-06-07 | End: 2018-06-07

## 2018-06-07 RX ORDER — SUCRALFATE 1 G/1
1 TABLET ORAL
Status: DISCONTINUED | OUTPATIENT
Start: 2018-06-07 | End: 2018-06-11

## 2018-06-07 RX ADMIN — FAMOTIDINE 20 MG: 10 INJECTION INTRAVENOUS at 13:48

## 2018-06-07 RX ADMIN — GLIPIZIDE 5 MG: 5 TABLET ORAL at 18:04

## 2018-06-07 RX ADMIN — ALBUMIN (HUMAN) 12.5 G: 0.25 INJECTION, SOLUTION INTRAVENOUS at 00:28

## 2018-06-07 RX ADMIN — LEVOTHYROXINE SODIUM 25 MCG: 25 TABLET ORAL at 18:04

## 2018-06-07 RX ADMIN — SODIUM CHLORIDE, SODIUM LACTATE, POTASSIUM CHLORIDE, CALCIUM CHLORIDE: 600; 310; 30; 20 INJECTION, SOLUTION INTRAVENOUS at 13:26

## 2018-06-07 RX ADMIN — SUCRALFATE 1 G: 1 TABLET ORAL at 22:24

## 2018-06-07 RX ADMIN — SODIUM CHLORIDE, SODIUM LACTATE, POTASSIUM CHLORIDE, AND CALCIUM CHLORIDE 75 ML/HR: 600; 310; 30; 20 INJECTION, SOLUTION INTRAVENOUS at 13:48

## 2018-06-07 RX ADMIN — PANTOPRAZOLE SODIUM 40 MG: 40 TABLET, DELAYED RELEASE ORAL at 18:05

## 2018-06-07 RX ADMIN — SUCRALFATE 1 G: 1 TABLET ORAL at 18:04

## 2018-06-07 RX ADMIN — INSULIN LISPRO 12 UNITS: 100 INJECTION, SOLUTION INTRAVENOUS; SUBCUTANEOUS at 00:27

## 2018-06-07 RX ADMIN — INSULIN LISPRO 9 UNITS: 100 INJECTION, SOLUTION INTRAVENOUS; SUBCUTANEOUS at 13:48

## 2018-06-07 RX ADMIN — LACTULOSE 20 G: 20 SOLUTION ORAL at 18:03

## 2018-06-07 RX ADMIN — Medication 10 ML: at 05:31

## 2018-06-07 RX ADMIN — PROPOFOL 100 MG: 10 INJECTION, EMULSION INTRAVENOUS at 14:00

## 2018-06-07 RX ADMIN — RIFAXIMIN 550 MG: 550 TABLET ORAL at 18:04

## 2018-06-07 RX ADMIN — MIDODRINE HYDROCHLORIDE 2.5 MG: 2.5 TABLET ORAL at 18:05

## 2018-06-07 RX ADMIN — FOLIC ACID: 5 INJECTION, SOLUTION INTRAMUSCULAR; INTRAVENOUS; SUBCUTANEOUS at 10:46

## 2018-06-07 RX ADMIN — THIAMINE HYDROCHLORIDE 200 MG: 100 INJECTION, SOLUTION INTRAMUSCULAR; INTRAVENOUS at 10:17

## 2018-06-07 NOTE — PROGRESS NOTES
7:18 PM Beside shift change report given by Reyes Garcia RN  to RUTH ANN Mendes RN. Report included the information from the [facility] I/O, and recent results. Denies pain or discomfort. Call light within reach. Bed in low position. Alarm turned on. Mild agitation- patient wants to move around in bed.     8:03 PM Patient currently sleeping; no signs of distress/agitation or pain. 8:11 PM vitals taken- MEWS Score of 4 due to elevated pulse of 112. Patient has been trending decreased blood pressure and has had an increase in pulse since last set of vitals. CIWHA score of 1. Patient denies having shortness of breath or any other symptoms. Will continue to monitor patient and contact provider if any symptoms arise. 9:19 PM Patient resting, watching TV; denies having any pain. Vitals taken. MEW Score 1- Nasal Canula placed back on nose for patient. Given ice water. 10:25 PM given medications; linens/gown/hygiene care by CNA. 11:44 PM Patient currently repositioned in bed; denies having any pain    12:11 AM patient currently watching TV; denies having any pain or distress. 12:38 AM: given medications; admits to having pain in the lower back at 5/10 that is chronic; denies wanting medication at this moment. Urinal emptied; repositioned in the bed.     1:01 AM patient requests pain medication. 2:03 AM denies having any pain; urinal used. 2:35 AM labs drawn. 2:54 AM Contacted Dr. Miguel Sesay and informed about patient's c/o pain. Orders placed for patient. 3:36 AM: medications given    3:58 AM Reassessment    5:14 AM Received call from lab regarding drawn labs [facility]. Will re-drawn labs for patient. 5:56 AM Patient sleeping; no signs of distress or pain noted. 6:31 AM Patient sleeping; no signs of distress or pain. 7:48 AM Beside shift change report given by RUTH ANN Mendes RN to Ale Ngo RN. Report included the information from the [facility] I/[facility], and recent results. Patient denies having any pain at the moment.

## 2018-06-07 NOTE — PROGRESS NOTES
Reason for Admission:   Patient was transferred from [facility] with AMS and low NA                   RRAT Score:          15           Plan for utilizing home health:      HH vs skilled nursing                    Likelihood of Readmission:  Low/green                         Transition of Care Plan: This writer confirmed demographic information with patient and sister Kaitlynn Lopez [phone]. Patient having difficulty remembering specifics. Patient states he has insurance but not card or info with him. Sister states she lives 6 hours away but that she will provide patient transportation home at D/C. Patient has a parttime assistant at his home who could also provide transportation to appts. Patient uses a cane. [facility] has been in to see patient about applying for Medicaid. Dr. Cookie Blackman in to see patient and d/c options shared with here. OT and PT still to see patient    Care Management Interventions  Palliative Care Criteria Met (RRAT>21 & CHF Dx)?: No  Mode of Transport at Discharge:  Other (see comment) (sister Kaitlynn Lopez)  Transition of Care Consult (CM Consult): Discharge Planning  Current Support Network: Lives Alone  Confirm Follow Up Transport: Friends  Plan discussed with Pt/Family/Caregiver: Yes  [address] Provided?: No  Discharge Location  Discharge Placement: Skilled nursing facility

## 2018-06-07 NOTE — ROUTINE PROCESS
TRANSFER - OUT REPORT:    Verbal report given to [facility] for Tommy Auguste on Cristiana Herrera  being transferred to room 359 for routine progression of care       Report consisted of patients Situation, Background, Assessment and   Recommendations(SBAR). Information from the following report(s) SBAR, Procedure Summary and MAR was reviewed with the receiving nurse. Opportunity for questions and clarification was provided.       Patient transported with:   O2 @ 3 liters  Registered Nurse  [facility]

## 2018-06-07 NOTE — PROGRESS NOTES
RENAL DAILY PROGRESS NOTE    Patient: Jeovany Jama               Sex: male          DOA: 6/4/2018 10:34 PM        YOB: 1960      Age:  62 y.o.        LOS:  LOS: 3 days     Subjective:     Jeovany Jama is a 62 y.o.  who presents with symptomatic ascites  DKA (diabetic ketoacidoses) ([facility] [address]  dx.   asked to evaluate for hyponatremia. admitted with cirrhosis ,ascites  Chief complains:  - Reviewed last 24 hrs events     Current Facility-Administered Medications   Medication Dose Route Frequency    lactated Ringers infusion  75 mL/hr IntraVENous CONTINUOUS    sodium chloride (NS) flush 5-10 mL  5-10 mL IntraVENous Q8H    sodium chloride (NS) flush 5-10 mL  5-10 mL IntraVENous PRN    famotidine (PF) (PEPCID) 20 mg in sodium chloride 0.9% 10 mL injection  20 mg IntraVENous ONCE    insulin lispro (HUMALOG) injection   SubCUTAneous ONCE    glucose chewable tablet 16 g  4 Tab Oral PRN    glucagon (GLUCAGEN) injection 1 mg  1 mg IntraMUSCular PRN    dextrose (D50W) injection syrg 12.5-25 g  25-50 mL IntraVENous PRN    folic acid (FOLVITE) 1 mg in 0.9% sodium chloride 50 mL ivpb   IntraVENous DAILY    potassium phosphate 20 mmol in 0.9% sodium chloride 250 mL infusion   IntraVENous ONCE    midodrine (PROAMITINE) tablet 2.5 mg  2.5 mg Oral TID WITH MEALS    levothyroxine (SYNTHROID) tablet 25 mcg  25 mcg Oral ACB    pantoprazole (PROTONIX) tablet 40 mg  40 mg Oral ACB&D    thiamine (B-1) 200 mg in 0.9% sodium chloride 50 mL IVPB  200 mg IntraVENous DAILY    glipiZIDE (GLUCOTROL) tablet 5 mg  5 mg Oral ACB    sodium chloride (NS) flush 5-10 mL  5-10 mL IntraVENous Q8H    sodium chloride (NS) flush 5-10 mL  5-10 mL IntraVENous PRN    LORazepam (ATIVAN) tablet 1 mg  1 mg Oral Q1H PRN    Or    LORazepam (ATIVAN) injection 1 mg  1 mg IntraVENous Q1H PRN    LORazepam (ATIVAN) tablet 2 mg  2 mg Oral Q1H PRN    Or    LORazepam (ATIVAN) injection 2 mg  2 mg IntraVENous Q1H PRN    LORazepam (ATIVAN) injection 3 mg  3 mg IntraVENous Q15MIN PRN    ondansetron (ZOFRAN) injection 4 mg  4 mg IntraVENous Q6H PRN    insulin lispro (HUMALOG) injection   SubCUTAneous Q6H    sodium chloride (NS) flush 5-10 mL  5-10 mL IntraVENous Q8H    sodium chloride (NS) flush 5-10 mL  5-10 mL IntraVENous PRN    rifAXIMin (XIFAXAN) tablet 550 mg  550 mg Oral BID    lactulose (CHRONULAC) solution 20 g  30 mL Oral BID    glucose chewable tablet 16 g  4 Tab Oral PRN    glucagon (GLUCAGEN) injection 1 mg  1 mg IntraMUSCular PRN    dextrose (D50W) injection syrg 12.5-25 g  25-50 mL IntraVENous PRN       Objective:     Visit Vitals    BP 99/54 (BP 1 Location: Right arm, BP Patient Position: At rest)    Pulse 97    Temp 98.1 °F (36.7 °C)    Resp 20    Ht 5' 11\" (1.803 m)    Wt 85.1 kg (187 lb 9.6 oz)    SpO2 97%    BMI 26.16 kg/m2       Intake/Output Summary (Last 24 hours) at 06/07/18 1251  Last data filed at 06/07/18 0936   Gross per 24 hour   Intake           367.08 ml   Output             1375 ml   Net         -1007.92 ml       Physical Examination:     GEN:lethargic  RS: Chest is bilateral equal,   abd ascites  Extremities: + edema, no cyanosis, skin is warm on touch      Data Review:      Labs:     Hematology:   Recent Labs      06/07/18   0231  06/06/18   0420  06/05/18   0130   WBC  4.8  5.0  9.3   HGB  8.2*  8.0*  8.9*   HCT  22.8*  21.1*  23.9*     Chemistry:   Recent Labs      06/07/18   0231  06/06/18   0420  06/05/18   2330  06/05/18   0520  06/05/18   0130   BUN  19*  22*  26*  29*  27*   CREA  1.10  1.06  1.13  1.19  1.30   CA  8.8  8.8  8.2*  7.9*  8.0*   ALB  2.9*  2.6*  2.4*  2.0*  2.0*   K  3.6  3.2*  3.3*  4.0  4.0   NA  128*  128*  123*  121*  121*   CL  90*  89*  84*  80*  81*   CO2  27  29  28  26  25   PHOS  1.3*  0.7*   --    --   0.6*   GLU  216*  104*  224*  282*  250*        Images:    XR (Most Recent).  CXR reviewed by me and compared with previous CXR   Results from [facility] on 06/04/18   XR CHEST PA LAT   Narrative Chest x-rays, PA and lateral views:        INDICATION:    Shortness of breath. Ascites. DKA. History of diabetes and hemochromatosis. COMPARISON STUDY: None. FINDINGS:    Lungs are moderately hypoventilated. There is mild cardiomegaly. Pulmonary  vascularity is minimally engorged/congested. There is no overt pulmonary edema. There is right IJ Port-A-Cath and extended to upper most portion of right  atrium. At the bases of both lung there are mild streaky opacities, indicating mild  atelectatic changes without obvious confluent pneumonia. On the lateral view there is no evidence of pleural effusion in the posterior  tibial artery is. There is no pneumothorax. Mild multilevel spondylosis in thoracic spine. Impression IMPRESSION:    Moderate pulmonary hypoventilation. Mild cardiomegaly or borderline cardiac size, with minimal pulmonary vascular  congestion, without pulmonary edema. Mild streaky bibasilar atelectasis/fibrotic changes. No evidence of confluent pneumonia or pleural effusion. CT (Most Recent) No results found for this or any previous visit. EKG No results found for this or any previous visit. I have personally reviewed the old medical records and patient's labs    Plan / Recommendation:      1. Hyponatremia,unknown baseline,related to cirrhosis,alcohol,hyperglycemia. improving. continue fluid restrictions  2.hypokalemia,hypophosphatemia,ordered iv kphos    D/w Dr.libby Iván Rodriguez MD  Nephrology  6/7/2018

## 2018-06-07 NOTE — ANESTHESIA POSTPROCEDURE EVALUATION
Post-Anesthesia Evaluation & Assessment    Visit Vitals    BP (!) 87/63    Pulse 86    Temp 36.5 °C (97.7 °F)    Resp 17    Ht 5' 11\" (1.803 m)    Wt 85.1 kg (187 lb 9.6 oz)    SpO2 100%    BMI 26.16 kg/m2       Post-operative hydration adequate. Pain score (VAS): 0 Pain Scale 1: Numeric (0 - 10) (06/07/18 1423)  Pain Intensity 1: 0 (06/07/18 1423)   Managed. Mental status & Level of consciousness: returned to baseline    Neurological status: returned to baseline    Pulmonary status: airway patent, oxygen given as needed. Complications related to anesthesia: none    Patient has met all discharge requirements.     Additional comments:        Derrick Walden MD  June 7, 2018

## 2018-06-07 NOTE — PROGRESS NOTES
Admit Date: 6/4/2018  Date of Service: 6/7/2018    Reason for follow-up: AMS      Assessment:         Cirrhosis with ascites due to alcohol and hemochromatosis   -  S/p paracentesis 6/5- no evidence of peritonitis at this time   - hepatitis A/B/C panel negative; renal function currently normal   - INR of 3.5-2.6   - ammonia 47  Hemorrhagic gastritis-  S/p EGD on 6/7; biopsies obtained; H. Pylori pending  Severe hyponatremia in setting of chronic hyponatremia due to liver cirrhosis and beer potomania; on free fluid restriction of 800 ml/day   DM:  Hb A1C 7.2; on SSI- glucose remains elevated  Anemia of chronic disease  Thrombocytopenia-  Associated with cirrhosis  Hx of OM of the right 3rd toe-  Retained sutures    Plan:   Continue with Carafate, rifaximin, protonix, lactulose, Thiamin/Folate supplements  800 cc fluid restriction for now  SSI  Awaiting PT/OT evals to help determine placement  Follow CBC, CMP  Will remove sutures from toe    Extensive discussion with the patient and family regarding current treatments, SNF, cessation of alcohol, possible complications of cirrhosis and general medical therapies. All questions answered to the best of my abilities. Current Antibtiocs:   none  Lines:     I spent 60 minutes with the patient in face-to-face consultation, of which greater than 50% was spent in counseling and coordination of care as described above. Case discussed with:  [x]Patient  [x]Family  [x]Nursing  [x]Case Management  DVT Prophylaxis:  []Lovenox  []Hep SQ  []SCDs  []Coumadin   []On Heparin gtt  I have independently examined the patient and reviewed all lab studies and imgaing as well as review of nursing notes and physican notes from the past 24 hours. The plan of care has been discussed with the patient and all questions are answered. Fortunato Esparza D.O.   Pager [phone]      Allergies   Allergen Reactions    Bee Venom Protein (Honey Bee) Itching           Subjective:      Pt seen and examined. Family at bedside. Patient is reportedly more awake and alert than he has been for a while. Family repots that he is intermittently confused. Patient notes that he has some abdominal pain as well as back and joint pain. Hungry and thirsty. Objective:        Visit Vitals    BP 93/57 (BP 1 Location: Right arm, BP Patient Position: At rest)    Pulse 100    Temp 98.5 °F (36.9 °C)    Resp 20    Ht 5' 11\" (1.803 m)    Wt 85.1 kg (187 lb 9.6 oz)    SpO2 96%    BMI 26.16 kg/m2     Temp (24hrs), Av.1 °F (36.7 °C), Min:97.3 °F (36.3 °C), Max:98.6 °F (37 °C)        General:   awake alert and oriented, non-toxic   Skin:    dry and warm, thin, scattered bruising, dry flaky skin on feet and between toes, long toe and fingernails   HEENT:  + scleral icterus or pallor; oral mucosa moist, lips moist   Lymph Nodes:   not assessed today   Lungs:   non, labored; bilaterally clear to aspiration- no crackles wheezes rales or rhonchi, diminished at bases   Heart:  tachycardic, s1 and s2; no murmurs rubs or gallops; no edema, + pedal pulses   Abdomen:  soft, protuberant, not tense, + fluid wave, active bowel sounds, non-tender   Genitourinary:  no scrotal edema   Extremities:   average muscle tone; no contractures, no joint effusions, right 3rd toe  With amputation at DIP- sutures still in place   Neurologic:  No gross focal motor or sensory abnormalities; CN 2-12 intact; Follows commands. Psychiatric:   appropriate and interactive.        Labs: Results:   Chemistry Recent Labs      18   0231  18   0420  18   2330   GLU  216*  104*  224*   NA  128*  128*  123*   K  3.6  3.2*  3.3*   CL  90*  89*  84*   CO2  27  29  28   BUN  19*  22*  26*   CREA  1.10  1.06  1.13   CA  8.8  8.8  8.2*   AGAP  11  10  11   BUCR  17  21*  23*   AP  115  98  92   TP  5.8*  5.7*  5.4*   ALB  2.9*  2.6*  2.4*   GLOB  2.9  3.1  3.0   AGRAT  1.0  0.8  0.8      CBC w/Diff Recent Labs      18   0231  18 0420  18   0130   WBC  4.8  5.0  9.3   RBC  2.27*  2.20*  2.48*   HGB  8.2*  8.0*  8.9*   HCT  22.8*  21.1*  23.9*   PLT  46*  38*  52*   GRANS  63  68  65   LYMPH  22  15*  15*   EOS  0  1  0        No results found for: SDES Lab Results   Component Value Date/Time    Culture result: (A) 2018 04:30 AM     MRSA target DNA is detected (presumptive positive for MRSA colonization). Culture result:  2018 04:30 AM     MRSA CALLED TO AND CORRECTLY REPEATED BY:  Benjamín Obrien RN CCU 1009 TO I.T. Culture result: NO GROWTH 2 DAYS 2018 01:30 AM    Culture result: NO GROWTH 2 DAYS 2018 01:30 AM      Results for Anibal Garcia (MRN [id number]) as of 2018 17:23   Ref. Range 2018 14:40   BODY FLUID TYPE Latest Units:   ASCITIC FLUID   FLUID APPEARANCE Latest Units:   HAZY   FLUID COLOR Latest Units:   YELLOW   FLUID RBC CT. Latest Ref Range: NRRE /cu mm 3366 (A)   FLUID NUCLEATED CELLS Latest Ref Range: NRRE /cu mm 151 (A)   FLD NEUTROPHILS Latest Units: % 11   FLD BANDS Latest Units: % 0   FLD LYMPHS Latest Units: % 8   FLD MONOCYTES Latest Units: % 8   FLD EOSINS Latest Units: % 0   MACROPHAGE Latest Units: % 73   Fluid Type: Latest Units:   ASCITIC FLUID   Protein total, body fld. Latest Units: g/dL 0.8   Fluid Type: Latest Units:   ASCITIC FLUID   Albumin, body fld. Latest Units: g/dL <0.6       Imagin/6 CXR: Moderate pulmonary hypoventilation.     Mild cardiomegaly or borderline cardiac size, with minimal pulmonary vascular  congestion, without pulmonary edema.     Mild streaky bibasilar atelectasis/fibrotic changes.     No evidence of confluent pneumonia or pleural effusion.

## 2018-06-07 NOTE — PROGRESS NOTES
OT order received and chart reviewed. Patient off the floor for a procedure. Will continue to follow and see patient when available/ as appropriate.   Thank you for the referral.  Emma Ding MS OTR/L

## 2018-06-07 NOTE — ANESTHESIA PREPROCEDURE EVALUATION
Anesthetic History   No history of anesthetic complications            Review of Systems / Medical History  Patient summary reviewed and pertinent labs reviewed    Pulmonary          Smoker         Neuro/Psych     seizures        Comments: etoh abuse  H/o DT's Cardiovascular                  Exercise tolerance: <4 METS     GI/Hepatic/Renal           Liver disease    Comments: Cirrhosis  ascites Endo/Other    Diabetes: type 2    Anemia    Comments: Resolving DKA and hyponatremia  Low platelets Other Findings   Comments: Documentation of current medication  Current medications obtained, documented and obtained? YES      Risk Factors for Postoperative nausea/vomiting:       History of postoperative nausea/vomiting? NO       Female? NO       Motion sickness? NO       Intended opioid administration for postoperative analgesia? NO      Smoking Abstinence:  Current Smoker? YES  Elective Surgery? YES  Seen preoperatively by anesthesiologist or proxy prior to day of surgery? YES  Pt abstained from smoking 24 hours prior to anesthesia?  YES    Preventive care/screening for High Blood Pressure:  Aged 18 years and older: YES  Screened for high blood pressure: YES  Patients with high blood pressure referred to primary care provider   for BP management: YES                 Physical Exam    Airway  Mallampati: III  TM Distance: > 6 cm  Neck ROM: normal range of motion   Mouth opening: Normal     Cardiovascular  Regular rate and rhythm,  S1 and S2 normal,  no murmur, click, rub, or gallop             Dental    Dentition: Poor dentition     Pulmonary  Breath sounds clear to auscultation               Abdominal  GI exam deferred       Other Findings            Anesthetic Plan    ASA: 4  Anesthesia type: MAC          Induction: Intravenous  Anesthetic plan and risks discussed with: Patient

## 2018-06-07 NOTE — PROCEDURES
[facility]  [address] [facility], [address]     Endoscopic Gastroduodenoscopy Procedure Note    [place]  1960  [id number]    Indication: GI bleeding     : Ruddy Woodruff MD    Referring Provider:  None    Anesthesia/Sedation:  MAC anesthesia Propofol      Procedure Details     After infomed consent was obtained for the procedure, with all risks and benefits of procedure explained the patient was taken to the endoscopy suite and placed in the left lateral decubitus position. Following sequential administration of sedation as per above, the endoscope was inserted into the mouth and advanced under direct vision to third portion of the duodenum. A careful inspection was made as the gastroscope was withdrawn, including a retroflexed view of the proximal stomach; findings and interventions are described below. Findings:   Esophagus:normal  Stomach:  Multiple areas of hemorrhagic gastritis, anh in antrum  Duodenum/jejunum: normal    Therapies:  Bicap used to cauterize 2 areas of oozing    Specimens: * No specimens in log *           Complications:   None; patient tolerated the procedure well. EBL:  None. Impression:    hemorrhagic gastritis      Recommendations:  -Continue acid suppression. , -Clear liquid diet and advance as tolerated.   Add carafate  Check stool for H pylori  Monitor H&H, Tx as needed     Ruddy Woodruff MD  6/7/2018  2:20 PM

## 2018-06-07 NOTE — PROGRESS NOTES
PT eval order received and chart reviewed. Unable to see patient at this time as patient off unit at this time. Will follow up as patient schedule allows. Thank you for this referral. Shadia Godfrey, PT, DPT.

## 2018-06-07 NOTE — PROGRESS NOTES
Bedside shift change report given to this RN (oncoming nurse) by Alexander Markham (offgoing nurse). Report included the following information SBAR and Kardex.

## 2018-06-08 LAB
ANION GAP SERPL CALC-SCNC: 8 MMOL/L (ref 3–18)
BUN SERPL-MCNC: 14 MG/DL (ref 7–18)
BUN/CREAT SERPL: 14 (ref 12–20)
CALCIUM SERPL-MCNC: 8.4 MG/DL (ref 8.5–10.1)
CHLORIDE SERPL-SCNC: 93 MMOL/L (ref 100–108)
CO2 SERPL-SCNC: 30 MMOL/L (ref 21–32)
CREAT SERPL-MCNC: 0.98 MG/DL (ref 0.6–1.3)
GLUCOSE BLD STRIP.AUTO-MCNC: 212 MG/DL (ref 70–110)
GLUCOSE BLD STRIP.AUTO-MCNC: 238 MG/DL (ref 70–110)
GLUCOSE BLD STRIP.AUTO-MCNC: 241 MG/DL (ref 70–110)
GLUCOSE BLD STRIP.AUTO-MCNC: 262 MG/DL (ref 70–110)
GLUCOSE BLD STRIP.AUTO-MCNC: 331 MG/DL (ref 70–110)
GLUCOSE SERPL-MCNC: 200 MG/DL (ref 74–99)
INR PPP: 2.3 (ref 0.8–1.2)
MAGNESIUM SERPL-MCNC: 2.2 MG/DL (ref 1.6–2.6)
PHOSPHATE SERPL-MCNC: 1.2 MG/DL (ref 2.5–4.9)
POTASSIUM SERPL-SCNC: 3.5 MMOL/L (ref 3.5–5.5)
PROTHROMBIN TIME: 24.4 SEC (ref 11.5–15.2)
SODIUM SERPL-SCNC: 131 MMOL/L (ref 136–145)

## 2018-06-08 PROCEDURE — 97530 THERAPEUTIC ACTIVITIES: CPT

## 2018-06-08 PROCEDURE — 74011000250 HC RX REV CODE- 250: Performed by: INTERNAL MEDICINE

## 2018-06-08 PROCEDURE — 82962 GLUCOSE BLOOD TEST: CPT

## 2018-06-08 PROCEDURE — 74011000258 HC RX REV CODE- 258: Performed by: INTERNAL MEDICINE

## 2018-06-08 PROCEDURE — 85610 PROTHROMBIN TIME: CPT | Performed by: INTERNAL MEDICINE

## 2018-06-08 PROCEDURE — 97161 PT EVAL LOW COMPLEX 20 MIN: CPT

## 2018-06-08 PROCEDURE — 74011250636 HC RX REV CODE- 250/636: Performed by: INTERNAL MEDICINE

## 2018-06-08 PROCEDURE — 74011636637 HC RX REV CODE- 636/637: Performed by: INTERNAL MEDICINE

## 2018-06-08 PROCEDURE — 84100 ASSAY OF PHOSPHORUS: CPT | Performed by: INTERNAL MEDICINE

## 2018-06-08 PROCEDURE — 97116 GAIT TRAINING THERAPY: CPT

## 2018-06-08 PROCEDURE — 83735 ASSAY OF MAGNESIUM: CPT | Performed by: INTERNAL MEDICINE

## 2018-06-08 PROCEDURE — 65270000029 HC RM PRIVATE

## 2018-06-08 PROCEDURE — 74011250637 HC RX REV CODE- 250/637: Performed by: PHYSICIAN ASSISTANT

## 2018-06-08 PROCEDURE — 74011250637 HC RX REV CODE- 250/637: Performed by: INTERNAL MEDICINE

## 2018-06-08 PROCEDURE — 77010033678 HC OXYGEN DAILY

## 2018-06-08 PROCEDURE — 80048 BASIC METABOLIC PNL TOTAL CA: CPT | Performed by: INTERNAL MEDICINE

## 2018-06-08 RX ORDER — SPIRONOLACTONE 25 MG/1
50 TABLET ORAL DAILY
Status: DISCONTINUED | OUTPATIENT
Start: 2018-06-08 | End: 2018-06-12

## 2018-06-08 RX ORDER — INSULIN LISPRO 100 [IU]/ML
INJECTION, SOLUTION INTRAVENOUS; SUBCUTANEOUS
Status: DISCONTINUED | OUTPATIENT
Start: 2018-06-08 | End: 2018-06-13 | Stop reason: HOSPADM

## 2018-06-08 RX ORDER — SODIUM,POTASSIUM PHOSPHATES 280-250MG
1 POWDER IN PACKET (EA) ORAL 4 TIMES DAILY
Status: COMPLETED | OUTPATIENT
Start: 2018-06-08 | End: 2018-06-08

## 2018-06-08 RX ORDER — KETOROLAC TROMETHAMINE 15 MG/ML
15 INJECTION, SOLUTION INTRAMUSCULAR; INTRAVENOUS ONCE
Status: COMPLETED | OUTPATIENT
Start: 2018-06-08 | End: 2018-06-08

## 2018-06-08 RX ORDER — TRAMADOL HYDROCHLORIDE 50 MG/1
50 TABLET ORAL
Status: DISCONTINUED | OUTPATIENT
Start: 2018-06-08 | End: 2018-06-11

## 2018-06-08 RX ADMIN — Medication 10 ML: at 15:34

## 2018-06-08 RX ADMIN — INSULIN LISPRO 6 UNITS: 100 INJECTION, SOLUTION INTRAVENOUS; SUBCUTANEOUS at 11:46

## 2018-06-08 RX ADMIN — TRAMADOL HYDROCHLORIDE 50 MG: 50 TABLET, FILM COATED ORAL at 12:40

## 2018-06-08 RX ADMIN — LACTULOSE 20 G: 20 SOLUTION ORAL at 08:32

## 2018-06-08 RX ADMIN — PANTOPRAZOLE SODIUM 40 MG: 40 TABLET, DELAYED RELEASE ORAL at 08:32

## 2018-06-08 RX ADMIN — LEVOTHYROXINE SODIUM 25 MCG: 25 TABLET ORAL at 08:32

## 2018-06-08 RX ADMIN — POTASSIUM & SODIUM PHOSPHATES POWDER PACK 280-160-250 MG 1 PACKET: 280-160-250 PACK at 08:33

## 2018-06-08 RX ADMIN — POTASSIUM & SODIUM PHOSPHATES POWDER PACK 280-160-250 MG 1 PACKET: 280-160-250 PACK at 12:40

## 2018-06-08 RX ADMIN — SPIRONOLACTONE 50 MG: 25 TABLET ORAL at 15:33

## 2018-06-08 RX ADMIN — Medication 10 ML: at 21:45

## 2018-06-08 RX ADMIN — PANTOPRAZOLE SODIUM 40 MG: 40 TABLET, DELAYED RELEASE ORAL at 15:33

## 2018-06-08 RX ADMIN — POTASSIUM & SODIUM PHOSPHATES POWDER PACK 280-160-250 MG 1 PACKET: 280-160-250 PACK at 21:38

## 2018-06-08 RX ADMIN — INSULIN LISPRO 9 UNITS: 100 INJECTION, SOLUTION INTRAVENOUS; SUBCUTANEOUS at 17:22

## 2018-06-08 RX ADMIN — SUCRALFATE 1 G: 1 TABLET ORAL at 21:38

## 2018-06-08 RX ADMIN — Medication 10 ML: at 03:36

## 2018-06-08 RX ADMIN — LACTULOSE 20 G: 20 SOLUTION ORAL at 17:21

## 2018-06-08 RX ADMIN — RIFAXIMIN 550 MG: 550 TABLET ORAL at 17:21

## 2018-06-08 RX ADMIN — Medication 10 ML: at 00:49

## 2018-06-08 RX ADMIN — FOLIC ACID: 5 INJECTION, SOLUTION INTRAMUSCULAR; INTRAVENOUS; SUBCUTANEOUS at 08:33

## 2018-06-08 RX ADMIN — RIFAXIMIN 550 MG: 550 TABLET ORAL at 08:32

## 2018-06-08 RX ADMIN — POTASSIUM & SODIUM PHOSPHATES POWDER PACK 280-160-250 MG 1 PACKET: 280-160-250 PACK at 17:21

## 2018-06-08 RX ADMIN — Medication 10 ML: at 00:47

## 2018-06-08 RX ADMIN — MIDODRINE HYDROCHLORIDE 2.5 MG: 2.5 TABLET ORAL at 08:32

## 2018-06-08 RX ADMIN — MIDODRINE HYDROCHLORIDE 2.5 MG: 2.5 TABLET ORAL at 11:45

## 2018-06-08 RX ADMIN — Medication 10 ML: at 06:40

## 2018-06-08 RX ADMIN — THIAMINE HYDROCHLORIDE 200 MG: 100 INJECTION, SOLUTION INTRAMUSCULAR; INTRAVENOUS at 08:33

## 2018-06-08 RX ADMIN — MIDODRINE HYDROCHLORIDE 2.5 MG: 2.5 TABLET ORAL at 17:21

## 2018-06-08 RX ADMIN — INSULIN LISPRO 6 UNITS: 100 INJECTION, SOLUTION INTRAVENOUS; SUBCUTANEOUS at 07:37

## 2018-06-08 RX ADMIN — INSULIN LISPRO 12 UNITS: 100 INJECTION, SOLUTION INTRAVENOUS; SUBCUTANEOUS at 00:44

## 2018-06-08 RX ADMIN — Medication 10 ML: at 03:37

## 2018-06-08 RX ADMIN — GLIPIZIDE 5 MG: 5 TABLET ORAL at 08:32

## 2018-06-08 RX ADMIN — SUCRALFATE 1 G: 1 TABLET ORAL at 15:33

## 2018-06-08 RX ADMIN — SUCRALFATE 1 G: 1 TABLET ORAL at 11:45

## 2018-06-08 RX ADMIN — KETOROLAC TROMETHAMINE 15 MG: 15 INJECTION, SOLUTION INTRAMUSCULAR; INTRAVENOUS at 03:36

## 2018-06-08 RX ADMIN — INSULIN LISPRO 6 UNITS: 100 INJECTION, SOLUTION INTRAVENOUS; SUBCUTANEOUS at 21:39

## 2018-06-08 RX ADMIN — SUCRALFATE 1 G: 1 TABLET ORAL at 08:32

## 2018-06-08 RX ADMIN — Medication 10 ML: at 06:30

## 2018-06-08 NOTE — PROGRESS NOTES
Problem: Mobility Impaired (Adult and Pediatric)  Goal: *Acute Goals and Plan of Care (Insert Text)  Physical Therapy Goals  Initiated 6/8/2018 and to be accomplished within 7 day(s)  1. Patient will move from supine to sit and sit to supine , scoot up and down and roll side to side in bed with modified independence. 2.  Patient will transfer from bed to chair and chair to bed with modified independence using the least restrictive device. 3.  Patient will perform sit to stand with modified independence. 4.  Patient will ambulate with supervision/set-up for >150 feet with the least restrictive device. 5.  Patient will ascend/descend 7 stairs with 1 handrail(s) with supervision/set-up. physical Therapy EVALUATION    Patient: Jose Puga (20 y.o. male)  Date: 6/8/2018  Primary Diagnosis: symptomatic ascites  DKA (diabetic ketoacidoses) (HCC)  dx  Procedure(s) (LRB):  ENDOSCOPY WITH POSSIBLE with gold probe (N/A) 1 Day Post-Op   Precautions: Fall      ASSESSMENT :  Patient is 58yo M admitted to hospital for symptomatic ascites and presents today alert and agreeable to therpay and was supine in bed upon arrival. Patient is A&Ox4, though demonstrates decreased insight into deficits during session. Patient transferred to EOB and required cues to log roll to decrease poor body mechanics as patient attempting to use body mechanics using momentum to get to EOB. Patient performed objective assessment then stood with minimal dizziness which dissipated with several mins in standing. Patient then ambulated 20ft before taking seated rest break. Patient returned to supine for rest and reports he is fatigued; he returned to standing for 2nd ambulation trial of 60ft. At conclusion of session patient transferred to bed and was left in chair position with bed alarm activated and call bell by his side. Patient and family educated on need to have staff member assist for any OOB activity and they acknowledged understanding. Educated on role of PT, goals moving forward, and on continuing to ambulate with nursing staff several times per day. Patient demos decreased strength, mobility, and endurance and will benefit from skilled intervention to address the above impairments. Patients rehabilitation potential is considered to be Good  Factors which may influence rehabilitation potential include:   []         None noted  [x]         Mental ability/status  [x]         Medical condition  [x]         Home/family situation and support systems  [x]         Safety awareness  [x]         Pain tolerance/management  []         Other:      PLAN :  Recommendations and Planned Interventions:  [x]           Bed Mobility Training             [x]    Neuromuscular Re-Education  [x]           Transfer Training                   []    Orthotic/Prosthetic Training  [x]           Gait Training                          []    Modalities  [x]           Therapeutic Exercises          []    Edema Management/Control  [x]           Therapeutic Activities            [x]    Patient and Family Training/Education  []           Other (comment):    Frequency/Duration: Patient will be followed by physical therapy 1-2 times per day/4-7 days per week to address goals. Discharge Recommendations: Home Health  Further Equipment Recommendations for Discharge: N/A     G-CODES     Mobility  Current  CI= 1-19%   Goal  CI= 1-19%.   The severity rating is based on the Level of Assistance required for Functional Mobility and ADLs.        G-CODES     Eval Complexity: History: MEDIUM  Complexity : 1-2 comorbidities / personal factors will impact the outcome/ POC Exam:LOW Complexity : 1-2 Standardized tests and measures addressing body structure, function, activity limitation and / or participation in recreation  Presentation: LOW Complexity : Stable, uncomplicated  Clinical Decision Making:Low Complexity   Overall Complexity:LOW     SUBJECTIVE:   Patient stated I want to get up.    OBJECTIVE DATA SUMMARY:     Past Medical History:   Diagnosis Date    Alcohol abuse     Ascites     Hemochromatosis     Liver cirrhosis ([facility] [address] [facility]     Seizures ([facility] [address] [facility]     Thrombocytopenia ([facility] [address]    History reviewed. No pertinent surgical history. Barriers to Learning/Limitations: None  Compensate with: N/A  Prior Level of Function/Home Situation: Patient lives alone in 2 story home with FFSU and 7STE with HR. Patient has assistant at home, however he reports he hired him to drive him to appointments and do construction around his home. Patient has no AD/DME. Home Situation  Home Environment: Private residence  # Steps to Enter: 5  One/Two Story Residence: Two story  # of Interior Steps: 13  Height of Each Step (in): 5 inches  Interior Rails: Left  Lift Chair Available: No  Living Alone: No  Support Systems: Family member(s)  Patient Expects to be Discharged to[de-identified] Private residence  Current DME Used/Available at Home: None  Critical Behavior:   A&Ox4   Strength:    Strength: Generally decreased, functional (BLE)   Tone & Sensation:   Tone: Normal (BLE)   Sensation: Intact (BLE)   Range Of Motion:  AROM: Within functional limits (BLE)   Functional Mobility:  Bed Mobility:   Supine to Sit: Stand-by assistance  Sit to Supine: Stand-by assistance  Scooting: Stand-by assistance (HOB in trendelenberg)  Transfers:  Sit to Stand: Stand-by assistance (x2 transfers)  Stand to Sit: Stand-by assistance    Balance:   Sitting: Intact  Standing: Impaired; With support  Standing - Static: Fair  Standing - Dynamic : Fair  Ambulation/Gait Training:  Distance (ft): 80 Feet (ft) (20ft, 60ft)  Assistive Device: Walker, rolling  Ambulation - Level of Assistance: Contact guard assistance    Base of Support: Narrowed  Speed/Mai: Slow  Interventions: Verbal cues; Visual/Demos  Pain:  Pt reports 0/10 pain or discomfort prior to treatment.    Pt reports 0/10 pain or discomfort post treatment.      Activity Tolerance:   Patient tolerated activity well and was left resting with call bell by his side  Please refer to the flowsheet for vital signs taken during this treatment. After treatment:   []         Patient left in no apparent distress sitting up in chair  [x]         Patient left in no apparent distress in bed  [x]         Call bell left within reach  [x]         Nursing notified Joshua Lopez)  [x]         Caregiver present  [x]         Bed alarm activated  []         SCDs in place to B LE     COMMUNICATION/EDUCATION:   [x]         Fall prevention education was provided and the patient/caregiver indicated understanding. [x]         Patient/family have participated as able in goal setting and plan of care. [x]         Patient/family agree to work toward stated goals and plan of care. []         Patient understands intent and goals of therapy, but is neutral about his/her participation. []         Patient is unable to participate in goal setting and plan of care.     Thank you for this referral.  Verlan Rinne, PT   Time Calculation: 38 mins

## 2018-06-08 NOTE — PROGRESS NOTES
[email or web address]  [phone]    Gastroenterology follow up-Progress note    Impression:  1. Decompensated alcoholic cirrhosis  2. Melena resolved - hemorrhagic gastritis on EGD, no varices  3. Ascites s/p paracentesis, fluid negative, abdomen more enlarged, mild distention today  4. Hemochromatosis, no phlembotomy X 1 year    Plan:  1. Repeat LFTs  2. Start spironolactone 50mg once daily  3. Continue xifaxan and lactulose as previously ordered  4. Consider repeat paracentesis w/ fluid analysis  5. Continue PPI and carafate    If LFTs and h/h stable will be ok for discharge and OP f/u pending PT/OT evals. Chief Complaint: Decompensated cirrhosis      Subjective:  Mild abdominal, back and shoulder pain today all occurring with movement    ROS: Denies any fevers, chills, rash.      Eyes: conjunctiva normal, EOM normal   Neck: ROM normal, supple and trachea normal   Cardiovascular: heart normal, intact distal pulses, normal rate and regular rhythm   Pulmonary/Chest Wall: breath sounds normal and effort normal   Abdominal: Mild distention     Patient Active Problem List   Diagnosis Code    Type 2 diabetes mellitus with hyperosmolarity (HCC) C45.06    Alcoholic cirrhosis (HCC) J81.10    Alcohol withdrawal syndrome, with delirium ([facility] [address] F10.231    Hyponatremia E87.1    Nausea R11.0    Other hemochromatosis E83.118    Weakness generalized R53.1    Ascites due to alcoholic hepatitis Q17.60    Thrombocytopenia (HCC) D69.6         Visit Vitals    BP 95/59 (BP 1 Location: Right arm, BP Patient Position: At rest)    Pulse 95    Temp 97.2 °F (36.2 °C)    Resp 18    Ht 5' 11\" (1.803 m)    Wt 88.3 kg (194 lb 11.2 oz)    SpO2 97%    BMI 27.16 kg/m2           Intake/Output Summary (Last 24 hours) at 06/08/18 1339  Last data filed at 06/08/18 1241   Gross per 24 hour   Intake             1380 ml   Output              900 ml   Net              480 ml       CBC w/Diff    Lab Results   Component Value Date/Time WBC 4.8 06/07/2018 02:31 AM    RBC 2.27 (L) 06/07/2018 02:31 AM    HGB 8.2 (L) 06/07/2018 02:31 AM    HCT 22.8 (L) 06/07/2018 02:31 AM    .4 (H) 06/07/2018 02:31 AM    MCH 36.1 (H) 06/07/2018 02:31 AM    MCHC 36.0 06/07/2018 02:31 AM    RDW 16.5 (H) 06/07/2018 02:31 AM    PLT 46 (L) 06/07/2018 02:31 AM    Lab Results   Component Value Date/Time    GRANS 63 06/07/2018 02:31 AM    LYMPH 22 06/07/2018 02:31 AM    EOS 0 06/07/2018 02:31 AM    BANDS 10 (H) 06/06/2018 04:20 AM    BASOS 0 06/07/2018 02:31 AM    MYELO 1 (H) 06/05/2018 01:30 AM      Basic Metabolic Profile   Recent Labs      06/08/18   0300   NA  131*   K  3.5   CL  93*   CO2  30   BUN  14   CA  8.4*   MG  2.2   PHOS  1.2*        Hepatic Function    Lab Results   Component Value Date/Time    ALB 2.9 (L) 06/07/2018 02:31 AM    TP 5.8 (L) 06/07/2018 02:31 AM     06/07/2018 02:31 AM    Lab Results   Component Value Date/Time    SGOT 159 (H) 06/07/2018 02:31 AM          Coags   Recent Labs      06/08/18   0532  06/07/18   0231   PTP  24.4*  26.9*   INR  2.3*  2.6*               BENNIE Rogers    [facility]. [email or web address]  Phone: [phone]  Pager: [phone]

## 2018-06-08 NOTE — PROGRESS NOTES
Problem: Falls - Risk of  Goal: *Absence of Falls  Document Ulysses Fall Risk and appropriate interventions in the flowsheet. Outcome: Progressing Towards Goal  Fall Risk Interventions:  Mobility Interventions: Communicate number of staff needed for ambulation/transfer, Patient to call before getting OOB, Bed/chair exit alarm, Strengthening exercises (ROM-active/passive)    Mentation Interventions: Adequate sleep, hydration, pain control, Bed/chair exit alarm, More frequent rounding, Update white board    Medication Interventions: Bed/chair exit alarm, Patient to call before getting OOB, Teach patient to arise slowly    Elimination Interventions: Bed/chair exit alarm, Call light in reach, Patient to call for help with toileting needs, Urinal in reach, Toileting schedule/hourly rounds, Toilet paper/wipes in reach    History of Falls Interventions: Bed/chair exit alarm        Problem: Pressure Injury - Risk of  Goal: *Prevention of pressure injury  Document Santiago Scale and appropriate interventions in the flowsheet.    Outcome: Progressing Towards Goal  Pressure Injury Interventions:  Sensory Interventions: Assess changes in LOC, Check visual cues for pain, Maintain/enhance activity level    Moisture Interventions: Absorbent underpads, Check for incontinence Q2 hours and as needed, Maintain skin hydration (lotion/cream), Offer toileting Q_hr    Activity Interventions: Increase time out of bed, PT/OT evaluation    Mobility Interventions: HOB 30 degrees or less, PT/OT evaluation    Nutrition Interventions: Document food/fluid/supplement intake    Friction and Shear Interventions: Foam dressings/transparent film/skin sealants

## 2018-06-08 NOTE — ROUTINE PROCESS
Received report from [facility]. Pt AAOx3, NAD, breathing non labored, on room air, HOB up. IV site clean, dry and intact. Bed at the lowest level on lock position, call bell w/i reach. Bed alarm on. Bedside and Verbal shift change report given to RODNEY Saavedra (oncoming nurse) by me (offgoing nurse). Report included the following information SBAR, Kardex, Intake/Output, MAR and Recent Results.

## 2018-06-08 NOTE — DIABETES MGMT
Diabetes Patient/Family Education Record    Factors That  May Influence Patients Ability  to Learn or  Comply with Recommendations   []   Language barrier    []   Cultural needs   []   Motivation    []   Cognitive limitation    []   Physical   []   Education    []   Physiological factors   []   Hearing/vision/speaking impairment   []   Pentecostal beliefs    []   Financial factors   []  Other:   [x]  No factors identified at this time.      Person Instructed:   [x]   Patient   [x]   Family   []  Other     Preference for Learning:   [x]   Verbal   [x]   Written   [x]  Demonstration     Level of Comprehension & Competence:    []  Good                                      [x] Fair                                     []  Poor                             []  Needs Reinforcement   [x]  Teachback completed    Education Component:   [x]  Medication management, including how to administer insulin (if appropriate) and potential medication interactions    [x]  Nutritional management    [x]  Exercise   [x]  Signs, symptoms, and treatment of hyperglycemia and hypoglycemia   [x] Prevention, recognition and treatment of hyperglycemia and hypoglycemia   [x]  Importance of blood glucose monitoring and how to obtain a blood glucose meter    [x]  Instruction on use of the blood glucose meter   [x]  Discuss the importance of HbA1C monitoring    []  Sick day guidelines   []  Proper use and disposal of lancets, needles, syringes or insulin pens (if appropriate)   []  Potential long-term complications (retinopathy, kidney disease, neuropathy, foot care)   [x] Information about whom to contact in case of emergency or for more information    [x]  Goal:  Patient/family will demonstrate understanding of Diabetes Self Management Skills by: 6/14/18  Plan for post-discharge education or self-management support:    [x] Outpatient class schedule provided            [] Patient Declined    [] Scheduled for outpatient classes (date) _______     Tyrone Houser RD, CDE  [facility] [phone]

## 2018-06-08 NOTE — PROGRESS NOTES
Admit Date: 6/4/2018  Date of Service: 6/8/2018    Reason for follow-up: AMS      Assessment:         Cirrhosis with ascites due to alcohol and hemochromatosis   -  S/p paracentesis 6/5- no evidence of peritonitis at this time   - hepatitis A/B/C panel negative; renal function currently normal   - INR of 3.5-2.6   - ammonia 47  Hemorrhagic gastritis-  S/p EGD on 6/7; biopsies obtained; H. Pylori pending  Severe hyponatremia in setting of chronic hyponatremia due to liver cirrhosis and beer potomania; on free fluid restriction of 800 ml/day   DM:  Hb A1C 7.2; on SSI- glucose remains elevated  Anemia of chronic disease  Thrombocytopenia-  Associated with cirrhosis  Hx of OM of the right 3rd toe-  Retained sutures    Plan:   Continue with Carafate, rifaximin, protonix, lactulose, Thiamin/Folate supplements  800 cc fluid restriction for now  SSI  Awaiting PT/OT evals to help determine placement  Follow CBC, CMP  Will remove sutures from toe  Ultram prn for pain    Extensive discussion with the patient and family regarding current treatments, SNF, cessation of alcohol, possible complications of cirrhosis and general medical therapies. All questions answered to the best of my abilities. Current Antibtiocs:   none  Lines:     I spent 45 minutes with the patient in face-to-face consultation, of which greater than 50% was spent in counseling and coordination of care as described above. Case discussed with:  [x]Patient  [x]Family  [x]Nursing  [x]Case Management  DVT Prophylaxis:  []Lovenox  []Hep SQ  [x]SCDs  []Coumadin   []On Heparin gtt  I have independently examined the patient and reviewed all lab studies and imgaing as well as review of nursing notes and physican notes from the past 24 hours. The plan of care has been discussed with the patient and all questions are answered. Bao Chappell D.O.   Pager [phone]      Allergies   Allergen Reactions    Bee Venom Protein (Honey Bee) Itching Subjective:      Pt seen and examined. Family at bedside. Feeling better today. Having some abdominal pain as well as shoulder pain. Asking for pain medications. Thirsty. Intermittently confused. Waiting for PT to evaluate. Objective:        Visit Vitals    BP 95/59 (BP 1 Location: Right arm, BP Patient Position: At rest)    Pulse 95    Temp 97.2 °F (36.2 °C)    Resp 18    Ht 5' 11\" (1.803 m)    Wt 88.3 kg (194 lb 11.2 oz)    SpO2 97%    BMI 27.16 kg/m2     Temp (24hrs), Av °F (36.7 °C), Min:97.2 °F (36.2 °C), Max:98.5 °F (36.9 °C)        General:   awake alert and oriented, non-toxic   Skin:    dry and warm, thin, scattered bruising, dry flaky skin on feet and between toes, long toe and fingernails   HEENT:  + scleral icterus or pallor; oral mucosa moist, lips moist   Lymph Nodes:   not assessed today   Lungs:   non, labored; bilaterally clear to aspiration- no crackles wheezes rales or rhonchi, diminished at bases   Heart:  tachycardic, s1 and s2; no murmurs rubs or gallops; no edema, + pedal pulses   Abdomen:  soft, protuberant, not tense, + fluid wave, active bowel sounds, non-tender   Genitourinary:  no scrotal edema   Extremities:   average muscle tone; no contractures, no joint effusions, right 3rd toe  With amputation at DIP- sutures still in place   Neurologic:  No gross focal motor or sensory abnormalities; CN 2-12 intact; Follows commands. Psychiatric:   appropriate and interactive.        Labs: Results:   Chemistry Recent Labs      18   0300  18   0231  18   0420  18   2330   GLU  200*  216*  104*  224*   NA  131*  128*  128*  123*   K  3.5  3.6  3.2*  3.3*   CL  93*  90*  89*  84*   CO2  30  27  29  28   BUN  14  19*  22*  26*   CREA  0.98  1.10  1.06  1.13   CA  8.4*  8.8  8.8  8.2*   AGAP  8  11  10  11   BUCR  14  17  21*  23*   AP   --   115  98  92   TP   --   5.8*  5.7*  5.4*   ALB   --   2.9*  2.6*  2.4*   GLOB   --   2.9  3.1  3.0   AGRAT   -- 1.0  0.8  0.8      CBC w/Diff Recent Labs      18   0231  18   0420   WBC  4.8  5.0   RBC  2.27*  2.20*   HGB  8.2*  8.0*   HCT  22.8*  21.1*   PLT  46*  38*   GRANS  63  68   LYMPH  22  15*   EOS  0  1        No results found for: SDES Lab Results   Component Value Date/Time    Culture result: (A) 2018 04:30 AM     MRSA target DNA is detected (presumptive positive for MRSA colonization). Culture result:  2018 04:30 AM     MRSA CALLED TO AND CORRECTLY REPEATED BY:  Fernando Stanton RN CCU 1009 TO I.T. Culture result: NO GROWTH 3 DAYS 2018 01:30 AM    Culture result: NO GROWTH 3 DAYS 2018 01:30 AM      Results for Mattie Chong (MRN [id number]) as of 2018 17:23   Ref. Range 2018 14:40   BODY FLUID TYPE Latest Units:   ASCITIC FLUID   FLUID APPEARANCE Latest Units:   HAZY   FLUID COLOR Latest Units:   YELLOW   FLUID RBC CT. Latest Ref Range: NRRE /cu mm 3366 (A)   FLUID NUCLEATED CELLS Latest Ref Range: NRRE /cu mm 151 (A)   FLD NEUTROPHILS Latest Units: % 11   FLD BANDS Latest Units: % 0   FLD LYMPHS Latest Units: % 8   FLD MONOCYTES Latest Units: % 8   FLD EOSINS Latest Units: % 0   MACROPHAGE Latest Units: % 73   Fluid Type: Latest Units:   ASCITIC FLUID   Protein total, body fld. Latest Units: g/dL 0.8   Fluid Type: Latest Units:   ASCITIC FLUID   Albumin, body fld. Latest Units: g/dL <0.6       Imagin/6 CXR: Moderate pulmonary hypoventilation.     Mild cardiomegaly or borderline cardiac size, with minimal pulmonary vascular  congestion, without pulmonary edema.     Mild streaky bibasilar atelectasis/fibrotic changes.     No evidence of confluent pneumonia or pleural effusion.

## 2018-06-08 NOTE — PROGRESS NOTES
RENAL DAILY PROGRESS NOTE            60y M with cirrhosis , following for hyponatremia   Subjective:     Complaint:    Overnight events noted  no nausea, vomiting, chest pain, short of breath, cough, seizure. BP remain lower side    IMPRESSION:   Hyponatremia , cirrhosis , acute on chronic   Chronic hyponatremia ( sodium was at 134 in jan'2018)  Hypophosphatemia   Hypokalemia   Hyperglycemia   Cirrhosis , h/o etoh abuse   Anemia/thrombocytopenia   Hypontension, on midodrine    PLAN:    He had very higher urine osmolarity , very low serum sodium on arrival. He had osmolar gap also on arrival. His sodium gradually improving . Suspect his hyponatremia from heavy etoh use and poor dieatry intake.   Continue current management   Agree with tara           Current Facility-Administered Medications   Medication Dose Route Frequency    potassium, sodium phosphates (NEUTRA-PHOS) packet 1 Packet  1 Packet Oral QID    folic acid (FOLVITE) 1 mg in 0.9% sodium chloride 50 mL ivpb   IntraVENous DAILY    sucralfate (CARAFATE) tablet 1 g  1 g Oral AC&HS    midodrine (PROAMITINE) tablet 2.5 mg  2.5 mg Oral TID WITH MEALS    levothyroxine (SYNTHROID) tablet 25 mcg  25 mcg Oral ACB    pantoprazole (PROTONIX) tablet 40 mg  40 mg Oral ACB&D    thiamine (B-1) 200 mg in 0.9% sodium chloride 50 mL IVPB  200 mg IntraVENous DAILY    glipiZIDE (GLUCOTROL) tablet 5 mg  5 mg Oral ACB    sodium chloride (NS) flush 5-10 mL  5-10 mL IntraVENous Q8H    sodium chloride (NS) flush 5-10 mL  5-10 mL IntraVENous PRN    LORazepam (ATIVAN) tablet 1 mg  1 mg Oral Q1H PRN    Or    LORazepam (ATIVAN) injection 1 mg  1 mg IntraVENous Q1H PRN    LORazepam (ATIVAN) tablet 2 mg  2 mg Oral Q1H PRN    Or    LORazepam (ATIVAN) injection 2 mg  2 mg IntraVENous Q1H PRN    LORazepam (ATIVAN) injection 3 mg  3 mg IntraVENous Q15MIN PRN    ondansetron (ZOFRAN) injection 4 mg  4 mg IntraVENous Q6H PRN    insulin lispro (HUMALOG) injection   SubCUTAneous Q6H    sodium chloride (NS) flush 5-10 mL  5-10 mL IntraVENous Q8H    sodium chloride (NS) flush 5-10 mL  5-10 mL IntraVENous PRN    rifAXIMin (XIFAXAN) tablet 550 mg  550 mg Oral BID    lactulose (CHRONULAC) solution 20 g  30 mL Oral BID    glucose chewable tablet 16 g  4 Tab Oral PRN    glucagon (GLUCAGEN) injection 1 mg  1 mg IntraMUSCular PRN    dextrose (D50W) injection syrg 12.5-25 g  25-50 mL IntraVENous PRN       Review of Symptoms: comprehensive ROS negative except above. Objective:   Patient Vitals for the past 24 hrs:   Temp Pulse Resp BP SpO2   06/08/18 0808 98 °F (36.7 °C) 90 18 94/60 98 %   06/08/18 0430 98.5 °F (36.9 °C) 97 17 102/64 96 %   06/08/18 0013 98.2 °F (36.8 °C) 97 16 103/65 96 %   06/07/18 2121 97.9 °F (36.6 °C) 93 14 94/58 91 %   06/07/18 2011 97.6 °F (36.4 °C) (!) 112 20 97/60 94 %   06/07/18 1541 98.5 °F (36.9 °C) 100 20 93/57 96 %   06/07/18 1457 97.6 °F (36.4 °C) 97 20 - 100 %   06/07/18 1453 - 98 19 102/59 (!) 88 %   06/07/18 1443 - 86 17 - 100 %   06/07/18 1433 - 89 20 (!) 87/63 100 %   06/07/18 1429 97.7 °F (36.5 °C) 91 20 (!) 87/57 94 %   06/07/18 1427 - 86 20 (!) 86/62 99 %   06/07/18 1424 - 87 22 - 97 %   06/07/18 1423 - - - (!) 87/57 97 %   06/07/18 1346 98.3 °F (36.8 °C) 95 18 104/56 97 %   06/07/18 1136 98.1 °F (36.7 °C) 97 20 99/54 97 %        Weight change: 3.221 kg (7 lb 1.6 oz)     06/06 1901 - 06/08 0700  In: 1100 [P.O.:600;  I.V.:500]  Out: 2075 [Urine:2075]    Intake/Output Summary (Last 24 hours) at 06/08/18 1056  Last data filed at 06/08/18 0659   Gross per 24 hour   Intake              980 ml   Output              900 ml   Net               80 ml     Physical Exam:   General: comfortable, no acute distress   HEENT  supple neck,  CVS: S1S2 heard,  no rub  RS: + air entry b/l,   Abd: Soft, Non tender,   Neuro:  awake, alert , CN II-XII are grossly intact  Extrm: + edema, no cyanosis, clubbing   Skin: no visible  Rash          Data Review:     LABS:   Hematology: Recent Labs      06/07/18   0231  06/06/18   0420   WBC  4.8  5.0   HGB  8.2*  8.0*   HCT  22.8*  21.1*     Chemistry: Recent Labs      06/08/18   0300  06/07/18   0231  06/06/18   0420  06/05/18   2330   BUN  14  19*  22*  26*   CREA  0.98  1.10  1.06  1.13   CA  8.4*  8.8  8.8  8.2*   ALB   --   2.9*  2.6*  2.4*   K  3.5  3.6  3.2*  3.3*   NA  131*  128*  128*  123*   CL  93*  90*  89*  84*   CO2  30  27  29  28   PHOS  1.2*  1.3*  0.7*   --    GLU  200*  216*  104*  224*            Procedures/imaging: see electronic medical records for all procedures, Xrays and details which were not copied into this note but were reviewed prior to creation of Plan          Assessment & Plan:     As above         William Pimentel MD  6/8/2018  10:56 AM

## 2018-06-08 NOTE — PROGRESS NOTES
Patient's sister brought in copy of patient's insurance card on her phone. This writer directed her to the billing office to change his records from self pay.

## 2018-06-09 LAB
ANION GAP SERPL CALC-SCNC: 6 MMOL/L (ref 3–18)
BUN SERPL-MCNC: 12 MG/DL (ref 7–18)
BUN/CREAT SERPL: 13 (ref 12–20)
CALCIUM SERPL-MCNC: 8.6 MG/DL (ref 8.5–10.1)
CHLORIDE SERPL-SCNC: 91 MMOL/L (ref 100–108)
CO2 SERPL-SCNC: 31 MMOL/L (ref 21–32)
CREAT SERPL-MCNC: 0.94 MG/DL (ref 0.6–1.3)
ERYTHROCYTE [DISTWIDTH] IN BLOOD BY AUTOMATED COUNT: 18.2 % (ref 11.6–14.5)
GLUCOSE BLD STRIP.AUTO-MCNC: 282 MG/DL (ref 70–110)
GLUCOSE BLD STRIP.AUTO-MCNC: 304 MG/DL (ref 70–110)
GLUCOSE BLD STRIP.AUTO-MCNC: 349 MG/DL (ref 70–110)
GLUCOSE BLD STRIP.AUTO-MCNC: 508 MG/DL (ref 70–110)
GLUCOSE SERPL-MCNC: 250 MG/DL (ref 74–99)
HCT VFR BLD AUTO: 24.2 % (ref 36–48)
HGB BLD-MCNC: 8.5 G/DL (ref 13–16)
INR PPP: 2.4 (ref 0.8–1.2)
MCH RBC QN AUTO: 35.9 PG (ref 24–34)
MCHC RBC AUTO-ENTMCNC: 35.1 G/DL (ref 31–37)
MCV RBC AUTO: 102.1 FL (ref 74–97)
PHOSPHATE SERPL-MCNC: 2.4 MG/DL (ref 2.5–4.9)
PLATELET # BLD AUTO: 45 K/UL (ref 135–420)
PMV BLD AUTO: 11.3 FL (ref 9.2–11.8)
POTASSIUM SERPL-SCNC: 3.9 MMOL/L (ref 3.5–5.5)
PROTHROMBIN TIME: 25.2 SEC (ref 11.5–15.2)
RBC # BLD AUTO: 2.37 M/UL (ref 4.7–5.5)
SODIUM SERPL-SCNC: 128 MMOL/L (ref 136–145)
WBC # BLD AUTO: 5.3 K/UL (ref 4.6–13.2)

## 2018-06-09 PROCEDURE — 74011000258 HC RX REV CODE- 258: Performed by: INTERNAL MEDICINE

## 2018-06-09 PROCEDURE — 85027 COMPLETE CBC AUTOMATED: CPT | Performed by: INTERNAL MEDICINE

## 2018-06-09 PROCEDURE — 74011250637 HC RX REV CODE- 250/637: Performed by: INTERNAL MEDICINE

## 2018-06-09 PROCEDURE — 74011250637 HC RX REV CODE- 250/637: Performed by: PHYSICIAN ASSISTANT

## 2018-06-09 PROCEDURE — 85610 PROTHROMBIN TIME: CPT | Performed by: INTERNAL MEDICINE

## 2018-06-09 PROCEDURE — 82962 GLUCOSE BLOOD TEST: CPT

## 2018-06-09 PROCEDURE — 65270000029 HC RM PRIVATE

## 2018-06-09 PROCEDURE — 82947 ASSAY GLUCOSE BLOOD QUANT: CPT

## 2018-06-09 PROCEDURE — 74011000250 HC RX REV CODE- 250: Performed by: INTERNAL MEDICINE

## 2018-06-09 PROCEDURE — 80048 BASIC METABOLIC PNL TOTAL CA: CPT | Performed by: INTERNAL MEDICINE

## 2018-06-09 PROCEDURE — 74011636637 HC RX REV CODE- 636/637: Performed by: INTERNAL MEDICINE

## 2018-06-09 PROCEDURE — 36592 COLLECT BLOOD FROM PICC: CPT

## 2018-06-09 PROCEDURE — 74011250636 HC RX REV CODE- 250/636: Performed by: INTERNAL MEDICINE

## 2018-06-09 PROCEDURE — 84100 ASSAY OF PHOSPHORUS: CPT | Performed by: INTERNAL MEDICINE

## 2018-06-09 RX ORDER — GLIPIZIDE 5 MG/1
7.5 TABLET ORAL
Status: DISCONTINUED | OUTPATIENT
Start: 2018-06-10 | End: 2018-06-10

## 2018-06-09 RX ORDER — SODIUM,POTASSIUM PHOSPHATES 280-250MG
1 POWDER IN PACKET (EA) ORAL 4 TIMES DAILY
Status: DISCONTINUED | OUTPATIENT
Start: 2018-06-09 | End: 2018-06-11

## 2018-06-09 RX ORDER — INSULIN GLARGINE 100 [IU]/ML
20 INJECTION, SOLUTION SUBCUTANEOUS
Status: DISCONTINUED | OUTPATIENT
Start: 2018-06-09 | End: 2018-06-11

## 2018-06-09 RX ADMIN — Medication 10 ML: at 07:03

## 2018-06-09 RX ADMIN — THIAMINE HYDROCHLORIDE 200 MG: 100 INJECTION, SOLUTION INTRAMUSCULAR; INTRAVENOUS at 09:10

## 2018-06-09 RX ADMIN — FOLIC ACID: 5 INJECTION, SOLUTION INTRAMUSCULAR; INTRAVENOUS; SUBCUTANEOUS at 09:06

## 2018-06-09 RX ADMIN — INSULIN LISPRO 12 UNITS: 100 INJECTION, SOLUTION INTRAVENOUS; SUBCUTANEOUS at 12:11

## 2018-06-09 RX ADMIN — MIDODRINE HYDROCHLORIDE 2.5 MG: 2.5 TABLET ORAL at 18:55

## 2018-06-09 RX ADMIN — SUCRALFATE 1 G: 1 TABLET ORAL at 21:26

## 2018-06-09 RX ADMIN — INSULIN LISPRO 12 UNITS: 100 INJECTION, SOLUTION INTRAVENOUS; SUBCUTANEOUS at 18:15

## 2018-06-09 RX ADMIN — POTASSIUM & SODIUM PHOSPHATES POWDER PACK 280-160-250 MG 1 PACKET: 280-160-250 PACK at 12:11

## 2018-06-09 RX ADMIN — SUCRALFATE 1 G: 1 TABLET ORAL at 08:28

## 2018-06-09 RX ADMIN — Medication 10 ML: at 13:18

## 2018-06-09 RX ADMIN — TRAMADOL HYDROCHLORIDE 50 MG: 50 TABLET, FILM COATED ORAL at 19:14

## 2018-06-09 RX ADMIN — SUCRALFATE 1 G: 1 TABLET ORAL at 18:55

## 2018-06-09 RX ADMIN — PANTOPRAZOLE SODIUM 40 MG: 40 TABLET, DELAYED RELEASE ORAL at 08:27

## 2018-06-09 RX ADMIN — RIFAXIMIN 550 MG: 550 TABLET ORAL at 18:57

## 2018-06-09 RX ADMIN — TRAMADOL HYDROCHLORIDE 50 MG: 50 TABLET, FILM COATED ORAL at 12:11

## 2018-06-09 RX ADMIN — GLIPIZIDE 5 MG: 5 TABLET ORAL at 08:27

## 2018-06-09 RX ADMIN — SUCRALFATE 1 G: 1 TABLET ORAL at 12:11

## 2018-06-09 RX ADMIN — INSULIN GLARGINE 20 UNITS: 100 INJECTION, SOLUTION SUBCUTANEOUS at 21:30

## 2018-06-09 RX ADMIN — PANTOPRAZOLE SODIUM 40 MG: 40 TABLET, DELAYED RELEASE ORAL at 18:54

## 2018-06-09 RX ADMIN — LACTULOSE 20 G: 20 SOLUTION ORAL at 08:27

## 2018-06-09 RX ADMIN — INSULIN LISPRO 9 UNITS: 100 INJECTION, SOLUTION INTRAVENOUS; SUBCUTANEOUS at 08:28

## 2018-06-09 RX ADMIN — LACTULOSE 20 G: 20 SOLUTION ORAL at 19:14

## 2018-06-09 RX ADMIN — RIFAXIMIN 550 MG: 550 TABLET ORAL at 08:28

## 2018-06-09 RX ADMIN — Medication 10 ML: at 21:31

## 2018-06-09 RX ADMIN — LEVOTHYROXINE SODIUM 25 MCG: 25 TABLET ORAL at 08:28

## 2018-06-09 RX ADMIN — INSULIN LISPRO 15 UNITS: 100 INJECTION, SOLUTION INTRAVENOUS; SUBCUTANEOUS at 21:27

## 2018-06-09 RX ADMIN — POTASSIUM & SODIUM PHOSPHATES POWDER PACK 280-160-250 MG 1 PACKET: 280-160-250 PACK at 21:26

## 2018-06-09 NOTE — PROGRESS NOTES
[facility]  Progress Note    Patient: Miguel Bonilla Age: 62 y.o. : 1960 MR#: [id number] SSN: [id number]  Date/Time: 2018     Subjective:     Review of systems  I am little weak to day   No CP   NO NVD  No SOB  NO Cough     Assessment/Plan:   1. Cirrhosis of Liver   2 Chronic ET OH abuse   3 ET OH liver disease   4 Anemia of chronic illness   5 hyponatremia - from ET OH   6 DM  7 Thrombocytopenia - secondary to Liver failure   8 Coagulopathy - secondary to Liver failure   9 H/o Hemochromatosis   10 Acute encephalopathy - due to Hepatic failure     PLAN   - Vitals are stable   - Blood glucose levels are high - continue SSI , Increase dose of Glipizide   - hyponatremia - Management per Renal   - continue other management - Lactulose and Xifaxan for hepatic encephalopathy   - increase dose of Glucotrol to 7.5 mg daily           Case discussed with:  [x]Patient  []Family  [x]Nursing  []Case Management  DVT Prophylaxis:  []Lovenox  []Hep SQ  [x]SCDs  []Coumadin   []On Heparin gtt          Objective:   VS:   Visit Vitals    BP 98/69 (BP 1 Location: Right arm, BP Patient Position: At rest)    Pulse 91    Temp 98.2 °F (36.8 °C)    Resp 19    Ht 5' 11\" (1.803 m)    Wt 90.8 kg (200 lb 3.2 oz)    SpO2 96%    BMI 27.92 kg/m2      Tmax/24hrs: Temp (24hrs), Av.9 °F (36.6 °C), Min:97.2 °F (36.2 °C), Max:98.2 °F (36.8 °C)  IOBRIEF    Intake/Output Summary (Last 24 hours) at 18 1141  Last data filed at 18 7943   Gross per 24 hour   Intake              520 ml   Output              900 ml   Net             -380 ml       General:  Alert, cooperative, no acute distress   HEENT:  NC, Atraumatic. PERRLA, anicteric sclerae. Pulmonary:  Bilateral air entry present . No Wheezing/Rhonchi/Rales. Cardiovascular: Regular rate and Rhythm. GI:  Obvious ascites , non tender   Extremities:  No edema, cyanosis, clubbing. No calf tenderness.    Psych:  Not anxious or agitated. Neurologic: Grossly - Motor and Sensory functions are intact .       Medications:   Current Facility-Administered Medications   Medication Dose Route Frequency    potassium, sodium phosphates (NEUTRA-PHOS) packet 1 Packet  1 Packet Oral QID    traMADol (ULTRAM) tablet 50 mg  50 mg Oral Q6H PRN    spironolactone (ALDACTONE) tablet 50 mg  50 mg Oral DAILY    insulin lispro (HUMALOG) injection   SubCUTAneous AC&HS    folic acid (FOLVITE) 1 mg in 0.9% sodium chloride 50 mL ivpb   IntraVENous DAILY    sucralfate (CARAFATE) tablet 1 g  1 g Oral AC&HS    midodrine (PROAMITINE) tablet 2.5 mg  2.5 mg Oral TID WITH MEALS    levothyroxine (SYNTHROID) tablet 25 mcg  25 mcg Oral ACB    pantoprazole (PROTONIX) tablet 40 mg  40 mg Oral ACB&D    thiamine (B-1) 200 mg in 0.9% sodium chloride 50 mL IVPB  200 mg IntraVENous DAILY    glipiZIDE (GLUCOTROL) tablet 5 mg  5 mg Oral ACB    sodium chloride (NS) flush 5-10 mL  5-10 mL IntraVENous Q8H    sodium chloride (NS) flush 5-10 mL  5-10 mL IntraVENous PRN    LORazepam (ATIVAN) tablet 1 mg  1 mg Oral Q1H PRN    Or    LORazepam (ATIVAN) injection 1 mg  1 mg IntraVENous Q1H PRN    LORazepam (ATIVAN) tablet 2 mg  2 mg Oral Q1H PRN    Or    LORazepam (ATIVAN) injection 2 mg  2 mg IntraVENous Q1H PRN    LORazepam (ATIVAN) injection 3 mg  3 mg IntraVENous Q15MIN PRN    ondansetron (ZOFRAN) injection 4 mg  4 mg IntraVENous Q6H PRN    sodium chloride (NS) flush 5-10 mL  5-10 mL IntraVENous Q8H    sodium chloride (NS) flush 5-10 mL  5-10 mL IntraVENous PRN    rifAXIMin (XIFAXAN) tablet 550 mg  550 mg Oral BID    lactulose (CHRONULAC) solution 20 g  30 mL Oral BID    glucose chewable tablet 16 g  4 Tab Oral PRN    glucagon (GLUCAGEN) injection 1 mg  1 mg IntraMUSCular PRN    dextrose (D50W) injection syrg 12.5-25 g  25-50 mL IntraVENous PRN       Labs:    Recent Labs      06/09/18   0442  06/07/18   0231   WBC  5.3  4.8   HGB  8.5*  8.2*   HCT  24.2*  22.8* PLT  45*  46*     Recent Labs      06/09/18   0442  06/08/18   0532  06/08/18   0300  06/07/18   0231   NA  128*   --   131*  128*   K  3.9   --   3.5  3.6   CL  91*   --   93*  90*   CO2  31   --   30  27   GLU  250*   --   200*  216*   BUN  12   --   14  19*   CREA  0.94   --   0.98  1.10   CA  8.6   --   8.4*  8.8   MG   --    --   2.2   --    PHOS  2.4*   --   1.2*  1.3*   ALB   --    --    --   2.9*   SGOT   --    --    --   159*   ALT   --    --    --   74*   INR  2.4*  2.3*   --   2.6*         Signed By: Jackie Walden MD     June 9, 2018

## 2018-06-09 NOTE — PROGRESS NOTES
[place]  [facility], [address]    Progress Note    Patient: Jeovany Jama MRN: [id number]  SSN: [id number]    YOB: 1960  Age: 62 y.o. Sex: male      Admit Date: 6/4/2018    LOS: 5 days     Chief complaint:cirrhosis    Impression:     1. Decompensated alcoholic cirrhosis  2. Melena resolved - hemorrhagic gastritis on EGD, no varices  3. Ascites s/p paracentesis, fluid negative, abdomen more enlarged, mild distention today  4. Hemochromatosis, no phlembotomy X 1 year, hct to low to treat   5. Anemia multi factorial   6. Hypo natremia aggravated by high blood sugar     Plan:     1. Continue present regimen   2. Correct hyperglycemia     Prognosis is poor. Will see on Monday unless there are new questions [facility] [phone]      Subjective: The patient problems have remained stable . No n/v or clinical gi bleeding . Confused and sleepy. Objective:     Vitals:    06/08/18 1526 06/08/18 1943 06/08/18 2351 06/09/18 0419   BP: 116/74 92/59 99/60 98/69   Pulse: 85 95 90 91   Resp: 18 20 19 19   Temp: 98.1 °F (36.7 °C) 98.1 °F (36.7 °C) 97.8 °F (36.6 °C) 98.2 °F (36.8 °C)   SpO2: 95% 93% 97% 96%   Weight:    90.8 kg (200 lb 3.2 oz)   Height:    5' 11\" (1.803 m)        Physical Exam:   GENERAL: fatigued, cooperative, no distress, appears older than stated age  ABDOMEN: soft, non-tender.  Bowel sounds normal. No masses,  no organomegaly, mild distention     Lab/Data Review:  BMP:   Lab Results   Component Value Date/Time     (L) 06/09/2018 04:42 AM    K 3.9 06/09/2018 04:42 AM    CL 91 (L) 06/09/2018 04:42 AM    CO2 31 06/09/2018 04:42 AM    AGAP 6 06/09/2018 04:42 AM     (H) 06/09/2018 04:42 AM    BUN 12 06/09/2018 04:42 AM    CREA 0.94 06/09/2018 04:42 AM    GFRAA >60 06/09/2018 04:42 AM    GFRNA >60 06/09/2018 04:42 AM     CMP:   Lab Results   Component Value Date/Time     (L) 06/09/2018 04:42 AM    K 3.9 06/09/2018 04:42 AM    CL 91 (L) 06/09/2018 04:42 AM    CO2 31 06/09/2018 04:42 AM    AGAP 6 06/09/2018 04:42 AM     (H) 06/09/2018 04:42 AM    BUN 12 06/09/2018 04:42 AM    CREA 0.94 06/09/2018 04:42 AM    GFRAA >60 06/09/2018 04:42 AM    GFRNA >60 06/09/2018 04:42 AM    CA 8.6 06/09/2018 04:42 AM    PHOS 2.4 (L) 06/09/2018 04:42 AM     CBC:   Lab Results   Component Value Date/Time    WBC 5.3 06/09/2018 04:42 AM    HGB 8.5 (L) 06/09/2018 04:42 AM    HCT 24.2 (L) 06/09/2018 04:42 AM    PLT 45 (L) 06/09/2018 04:42 AM     COAGS:   Lab Results   Component Value Date/Time    PTP 25.2 (H) 06/09/2018 04:42 AM    INR 2.4 (H) 06/09/2018 04:42 AM     Liver Panel: No results found for: ALB, CBIL, TBIL, TP, GLOB, AGRAT, SGOT, ASTPOC, ALTPOC, ALT, GPT, AP       Principal Problem:    Hyponatremia (6/5/2018)    Active Problems:    Type 2 diabetes mellitus with hyperosmolarity ([address] (6/9/3816)      Alcoholic cirrhosis ([address] (6/5/2018)      Alcohol withdrawal syndrome, with delirium ([address] (6/5/2018)      Nausea (6/5/2018)      Other hemochromatosis (6/5/2018)      Weakness generalized (6/5/2018)      Ascites due to alcoholic hepatitis (8/7/0618)      Thrombocytopenia ([address] (6/5/2018)          Signed By: Regina Hall MD     June 9, 2018

## 2018-06-09 NOTE — PROGRESS NOTES
Problem: Falls - Risk of  Goal: *Absence of Falls  Document Ulysses Fall Risk and appropriate interventions in the flowsheet. Outcome: Progressing Towards Goal  Fall Risk Interventions:  Mobility Interventions: Assess mobility with egress test, Bed/chair exit alarm, Communicate number of staff needed for ambulation/transfer, OT consult for ADLs, Patient to call before getting OOB, PT Consult for mobility concerns, PT Consult for assist device competence, Strengthening exercises (ROM-active/passive), Utilize walker, cane, or other assitive device    Mentation Interventions: Adequate sleep, hydration, pain control, Bed/chair exit alarm, Door open when patient unattended, Evaluate medications/consider consulting pharmacy, Familiar objects from home, Increase mobility, More frequent rounding, Reorient patient, Room close to nurse's station, Update white board, Toileting rounds    Medication Interventions: Assess postural VS orthostatic hypotension, Bed/chair exit alarm, Evaluate medications/consider consulting pharmacy, Patient to call before getting OOB, Teach patient to arise slowly    Elimination Interventions: Bed/chair exit alarm, Call light in reach, Elevated toilet seat, Patient to call for help with toileting needs, Toilet paper/wipes in reach, Toileting schedule/hourly rounds, Urinal in reach    History of Falls Interventions: Bed/chair exit alarm, Consult care management for discharge planning, Door open when patient unattended, Evaluate medications/consider consulting pharmacy, Investigate reason for fall, Room close to nurse's station        Problem: Pressure Injury - Risk of  Goal: *Prevention of pressure injury  Document Santiago Scale and appropriate interventions in the flowsheet.    Outcome: Progressing Towards Goal  Pressure Injury Interventions:  Sensory Interventions: Assess changes in LOC, Assess need for specialty bed, Chair cushion, Discuss PT/OT consult with provider, Keep linens dry and wrinkle-free, Maintain/enhance activity level, Minimize linen layers, Monitor skin under medical devices, Pad between skin to skin, Pressure redistribution bed/mattress (bed type), Sit a 90-degree angle/use footstool if needed, Use 30-degree side-lying position    Moisture Interventions: Absorbent underpads, Apply protective barrier, creams and emollients, Assess need for specialty bed, Check for incontinence Q2 hours and as needed, Limit adult briefs, Maintain skin hydration (lotion/cream), Minimize layers, Moisture barrier, Offer toileting Q_hr    Activity Interventions: Assess need for specialty bed, Chair cushion, Increase time out of bed, Pressure redistribution bed/mattress(bed type), PT/OT evaluation    Mobility Interventions: Assess need for specialty bed, Chair cushion, HOB 30 degrees or less, Pressure redistribution bed/mattress (bed type), PT/OT evaluation    Nutrition Interventions: Document food/fluid/supplement intake    Friction and Shear Interventions: Apply protective barrier, creams and emollients, Foam dressings/transparent film/skin sealants, HOB 30 degrees or less, Sit at 90-degree angle

## 2018-06-09 NOTE — PROGRESS NOTES
RENAL DAILY PROGRESS NOTE            60y M with cirrhosis , following for hyponatremia   Subjective:     Complaint:    Overnight events noted  Admit following fluid restriction very closely   BP remain lower side  Started on spiranolactone   Received toradol yesterday   no nausea, vomiting, chest pain, short of breath, cough, seizure. IMPRESSION:   Hyponatremia , cirrhosis , acute on chronic He had very higher urine osmolarity , very low serum sodium on arrival. He had osmolar gap also on arrival. Suspect his hyponatremia from heavy etoh use and poor dieatry intake. Chronic hyponatremia ( sodium was at 134 in jan'2018)  Hypophosphatemia   Hypokalemia   Hyperglycemia   Cirrhosis , h/o etoh abuse   Anemia/thrombocytopenia   Hypontension, on midodrine    PLAN:   His sodium is trending down today, received nsaids yesterday. Continue current fluid restriction, monitor response of spiranolactone. I have low suspicion of heparorenal syndrome given stability with his kidney function. Would suggest to increase midodine, to improve SBP. Avoid nsaids in future  ordered neturaphos for today. Discussed with Dr. Kareem Maldonado.          Current Facility-Administered Medications   Medication Dose Route Frequency    traMADol (ULTRAM) tablet 50 mg  50 mg Oral Q6H PRN    spironolactone (ALDACTONE) tablet 50 mg  50 mg Oral DAILY    insulin lispro (HUMALOG) injection   SubCUTAneous AC&HS    folic acid (FOLVITE) 1 mg in 0.9% sodium chloride 50 mL ivpb   IntraVENous DAILY    sucralfate (CARAFATE) tablet 1 g  1 g Oral AC&HS    midodrine (PROAMITINE) tablet 2.5 mg  2.5 mg Oral TID WITH MEALS    levothyroxine (SYNTHROID) tablet 25 mcg  25 mcg Oral ACB    pantoprazole (PROTONIX) tablet 40 mg  40 mg Oral ACB&D    thiamine (B-1) 200 mg in 0.9% sodium chloride 50 mL IVPB  200 mg IntraVENous DAILY    glipiZIDE (GLUCOTROL) tablet 5 mg  5 mg Oral ACB    sodium chloride (NS) flush 5-10 mL  5-10 mL IntraVENous Q8H    sodium chloride (NS) flush 5-10 mL  5-10 mL IntraVENous PRN    LORazepam (ATIVAN) tablet 1 mg  1 mg Oral Q1H PRN    Or    LORazepam (ATIVAN) injection 1 mg  1 mg IntraVENous Q1H PRN    LORazepam (ATIVAN) tablet 2 mg  2 mg Oral Q1H PRN    Or    LORazepam (ATIVAN) injection 2 mg  2 mg IntraVENous Q1H PRN    LORazepam (ATIVAN) injection 3 mg  3 mg IntraVENous Q15MIN PRN    ondansetron (ZOFRAN) injection 4 mg  4 mg IntraVENous Q6H PRN    sodium chloride (NS) flush 5-10 mL  5-10 mL IntraVENous Q8H    sodium chloride (NS) flush 5-10 mL  5-10 mL IntraVENous PRN    rifAXIMin (XIFAXAN) tablet 550 mg  550 mg Oral BID    lactulose (CHRONULAC) solution 20 g  30 mL Oral BID    glucose chewable tablet 16 g  4 Tab Oral PRN    glucagon (GLUCAGEN) injection 1 mg  1 mg IntraMUSCular PRN    dextrose (D50W) injection syrg 12.5-25 g  25-50 mL IntraVENous PRN       Review of Symptoms: comprehensive ROS negative except above. Objective:     Patient Vitals for the past 24 hrs:   Temp Pulse Resp BP SpO2   06/09/18 0419 98.2 °F (36.8 °C) 91 19 98/69 96 %   06/08/18 2351 97.8 °F (36.6 °C) 90 19 99/60 97 %   06/08/18 1943 98.1 °F (36.7 °C) 95 20 92/59 93 %   06/08/18 1526 98.1 °F (36.7 °C) 85 18 116/74 95 %   06/08/18 1144 97.2 °F (36.2 °C) 95 18 95/59 97 %        Weight change: 2.495 kg (5 lb 8 oz)     06/07 1901 - 06/09 0700  In: 1000 [P.O.:950;  I.V.:50]  Out: 1800 [Urine:1800]    Intake/Output Summary (Last 24 hours) at 06/09/18 1040  Last data filed at 06/09/18 0343   Gross per 24 hour   Intake              520 ml   Output              900 ml   Net             -380 ml     Physical Exam:   General: comfortable, no acute distress   HEENT  supple neck,  CVS: S1S2 heard,  no rub  RS: + air entry b/l,   Abd: Soft, Non tender,   Neuro:  awake, alert , CN II-XII are grossly intact  Extrm: + edema, no cyanosis, clubbing   Skin: no visible  Rash          Data Review:     LABS:   Hematology:   Recent Labs      06/09/18   0442 06/07/18   0231   WBC  5.3  4.8   HGB  8.5*  8.2*   HCT  24.2*  22.8*     Chemistry:   Recent Labs      06/09/18   0442  06/08/18   0300  06/07/18   0231   BUN  12  14  19*   CREA  0.94  0.98  1.10   CA  8.6  8.4*  8.8   ALB   --    --   2.9*   K  3.9  3.5  3.6   NA  128*  131*  128*   CL  91*  93*  90*   CO2  31  30  27   PHOS  2.4*  1.2*  1.3*   GLU  250*  200*  216*            Procedures/imaging: see electronic medical records for all procedures, Xrays and details which were not copied into this note but were reviewed prior to creation of Plan          Assessment & Plan:     As above         Gita Rothman MD  6/9/2018  10:56 AM

## 2018-06-09 NOTE — ROUTINE PROCESS
Received report from [facility]. Pt AAOx3, NAD, breathing non labored, on room air, HOB up. IV site clean, dry and intact. Bed at the lowest level on lock position, call bell w/i reach. Bedside and Verbal shift change report given to RODNEY Street (oncoming nurse) by me (offgoing nurse). Report included the following information SBAR, Kardex, Intake/Output, MAR, Accordion, Recent Results and Cardiac Rhythm not on heart monitor.

## 2018-06-10 LAB
ANION GAP SERPL CALC-SCNC: 6 MMOL/L (ref 3–18)
BUN SERPL-MCNC: 12 MG/DL (ref 7–18)
BUN/CREAT SERPL: 13 (ref 12–20)
CALCIUM SERPL-MCNC: 8.6 MG/DL (ref 8.5–10.1)
CHLORIDE SERPL-SCNC: 89 MMOL/L (ref 100–108)
CO2 SERPL-SCNC: 31 MMOL/L (ref 21–32)
CREAT SERPL-MCNC: 0.96 MG/DL (ref 0.6–1.3)
GLUCOSE BLD STRIP.AUTO-MCNC: 251 MG/DL (ref 70–110)
GLUCOSE BLD STRIP.AUTO-MCNC: 258 MG/DL (ref 70–110)
GLUCOSE BLD STRIP.AUTO-MCNC: 285 MG/DL (ref 70–110)
GLUCOSE BLD STRIP.AUTO-MCNC: 352 MG/DL (ref 70–110)
GLUCOSE BLD STRIP.AUTO-MCNC: 404 MG/DL (ref 70–110)
GLUCOSE SERPL-MCNC: 213 MG/DL (ref 74–99)
GLUCOSE SERPL-MCNC: 237 MG/DL (ref 74–99)
INR PPP: 2.3 (ref 0.8–1.2)
PHOSPHATE SERPL-MCNC: 1.9 MG/DL (ref 2.5–4.9)
POTASSIUM SERPL-SCNC: 4.2 MMOL/L (ref 3.5–5.5)
PROTHROMBIN TIME: 24.7 SEC (ref 11.5–15.2)
SODIUM SERPL-SCNC: 126 MMOL/L (ref 136–145)

## 2018-06-10 PROCEDURE — 74011250636 HC RX REV CODE- 250/636: Performed by: INTERNAL MEDICINE

## 2018-06-10 PROCEDURE — 74011250637 HC RX REV CODE- 250/637: Performed by: PHYSICIAN ASSISTANT

## 2018-06-10 PROCEDURE — 85610 PROTHROMBIN TIME: CPT | Performed by: INTERNAL MEDICINE

## 2018-06-10 PROCEDURE — 84100 ASSAY OF PHOSPHORUS: CPT | Performed by: INTERNAL MEDICINE

## 2018-06-10 PROCEDURE — 65270000029 HC RM PRIVATE

## 2018-06-10 PROCEDURE — 74011000258 HC RX REV CODE- 258: Performed by: INTERNAL MEDICINE

## 2018-06-10 PROCEDURE — 97165 OT EVAL LOW COMPLEX 30 MIN: CPT

## 2018-06-10 PROCEDURE — 74011636637 HC RX REV CODE- 636/637: Performed by: INTERNAL MEDICINE

## 2018-06-10 PROCEDURE — 80048 BASIC METABOLIC PNL TOTAL CA: CPT | Performed by: INTERNAL MEDICINE

## 2018-06-10 PROCEDURE — 74011250637 HC RX REV CODE- 250/637: Performed by: INTERNAL MEDICINE

## 2018-06-10 PROCEDURE — 74011000250 HC RX REV CODE- 250: Performed by: INTERNAL MEDICINE

## 2018-06-10 PROCEDURE — 36415 COLL VENOUS BLD VENIPUNCTURE: CPT | Performed by: INTERNAL MEDICINE

## 2018-06-10 RX ORDER — GLIPIZIDE 10 MG/1
10 TABLET ORAL
Status: DISCONTINUED | OUTPATIENT
Start: 2018-06-11 | End: 2018-06-11

## 2018-06-10 RX ADMIN — POTASSIUM & SODIUM PHOSPHATES POWDER PACK 280-160-250 MG 1 PACKET: 280-160-250 PACK at 21:22

## 2018-06-10 RX ADMIN — POTASSIUM & SODIUM PHOSPHATES POWDER PACK 280-160-250 MG 1 PACKET: 280-160-250 PACK at 18:04

## 2018-06-10 RX ADMIN — MIDODRINE HYDROCHLORIDE 2.5 MG: 2.5 TABLET ORAL at 09:47

## 2018-06-10 RX ADMIN — RIFAXIMIN 550 MG: 550 TABLET ORAL at 18:04

## 2018-06-10 RX ADMIN — INSULIN LISPRO 9 UNITS: 100 INJECTION, SOLUTION INTRAVENOUS; SUBCUTANEOUS at 18:07

## 2018-06-10 RX ADMIN — LEVOTHYROXINE SODIUM 25 MCG: 25 TABLET ORAL at 09:47

## 2018-06-10 RX ADMIN — LACTULOSE 20 G: 20 SOLUTION ORAL at 09:48

## 2018-06-10 RX ADMIN — SUCRALFATE 1 G: 1 TABLET ORAL at 14:12

## 2018-06-10 RX ADMIN — POTASSIUM & SODIUM PHOSPHATES POWDER PACK 280-160-250 MG 1 PACKET: 280-160-250 PACK at 09:48

## 2018-06-10 RX ADMIN — INSULIN LISPRO 15 UNITS: 100 INJECTION, SOLUTION INTRAVENOUS; SUBCUTANEOUS at 21:21

## 2018-06-10 RX ADMIN — FOLIC ACID: 5 INJECTION, SOLUTION INTRAMUSCULAR; INTRAVENOUS; SUBCUTANEOUS at 11:00

## 2018-06-10 RX ADMIN — GLIPIZIDE 7.5 MG: 5 TABLET ORAL at 09:47

## 2018-06-10 RX ADMIN — THIAMINE HYDROCHLORIDE 200 MG: 100 INJECTION, SOLUTION INTRAMUSCULAR; INTRAVENOUS at 11:00

## 2018-06-10 RX ADMIN — RIFAXIMIN 550 MG: 550 TABLET ORAL at 09:46

## 2018-06-10 RX ADMIN — SUCRALFATE 1 G: 1 TABLET ORAL at 21:22

## 2018-06-10 RX ADMIN — INSULIN LISPRO 9 UNITS: 100 INJECTION, SOLUTION INTRAVENOUS; SUBCUTANEOUS at 09:44

## 2018-06-10 RX ADMIN — MIDODRINE HYDROCHLORIDE 2.5 MG: 2.5 TABLET ORAL at 18:04

## 2018-06-10 RX ADMIN — TRAMADOL HYDROCHLORIDE 50 MG: 50 TABLET, FILM COATED ORAL at 14:51

## 2018-06-10 RX ADMIN — INSULIN LISPRO 9 UNITS: 100 INJECTION, SOLUTION INTRAVENOUS; SUBCUTANEOUS at 14:05

## 2018-06-10 RX ADMIN — Medication 10 ML: at 07:01

## 2018-06-10 RX ADMIN — PANTOPRAZOLE SODIUM 40 MG: 40 TABLET, DELAYED RELEASE ORAL at 18:04

## 2018-06-10 RX ADMIN — SPIRONOLACTONE 50 MG: 25 TABLET ORAL at 09:46

## 2018-06-10 RX ADMIN — PANTOPRAZOLE SODIUM 40 MG: 40 TABLET, DELAYED RELEASE ORAL at 09:46

## 2018-06-10 RX ADMIN — LACTULOSE 20 G: 20 SOLUTION ORAL at 18:04

## 2018-06-10 RX ADMIN — Medication 10 ML: at 07:02

## 2018-06-10 RX ADMIN — Medication 10 ML: at 21:21

## 2018-06-10 RX ADMIN — SUCRALFATE 1 G: 1 TABLET ORAL at 09:46

## 2018-06-10 RX ADMIN — Medication 10 ML: at 18:08

## 2018-06-10 RX ADMIN — TRAMADOL HYDROCHLORIDE 50 MG: 50 TABLET, FILM COATED ORAL at 21:22

## 2018-06-10 RX ADMIN — SUCRALFATE 1 G: 1 TABLET ORAL at 18:04

## 2018-06-10 RX ADMIN — INSULIN GLARGINE 20 UNITS: 100 INJECTION, SOLUTION SUBCUTANEOUS at 21:20

## 2018-06-10 RX ADMIN — MIDODRINE HYDROCHLORIDE 2.5 MG: 2.5 TABLET ORAL at 14:12

## 2018-06-10 RX ADMIN — POTASSIUM & SODIUM PHOSPHATES POWDER PACK 280-160-250 MG 1 PACKET: 280-160-250 PACK at 14:12

## 2018-06-10 NOTE — PROGRESS NOTES
RENAL DAILY PROGRESS NOTE            60y M with cirrhosis , following for hyponatremia   Subjective:     Complaint:    Overnight events noted  Looks comfortable    Phos remain low  Serum chemistry pending   no nausea, vomiting, chest pain, short of breath, cough, seizure. IMPRESSION:   Hyponatremia , cirrhosis , acute on chronic He had very higher urine osmolarity , very low serum sodium on arrival. He had osmolar gap also on arrival. Suspect his hyponatremia from heavy etoh use and poor dieatry intake. Chronic hyponatremia ( sodium was at 134 in jan'2018)  Hypophosphatemia   Hypokalemia   Hyperglycemia   Cirrhosis , h/o etoh abuse   Anemia/thrombocytopenia   Hypontension, on midodrine    PLAN:   Awaiting for lab today , .   Need better blood sugar control. Avoid nsaids   Continue  neturaphos for today. Discussed with Dr. Ponce Ryan.          Current Facility-Administered Medications   Medication Dose Route Frequency    potassium, sodium phosphates (NEUTRA-PHOS) packet 1 Packet  1 Packet Oral QID    glipiZIDE (GLUCOTROL) tablet 7.5 mg  7.5 mg Oral ACB    insulin glargine (LANTUS) injection 20 Units  20 Units SubCUTAneous QHS    traMADol (ULTRAM) tablet 50 mg  50 mg Oral Q6H PRN    spironolactone (ALDACTONE) tablet 50 mg  50 mg Oral DAILY    insulin lispro (HUMALOG) injection   SubCUTAneous AC&HS    folic acid (FOLVITE) 1 mg in 0.9% sodium chloride 50 mL ivpb   IntraVENous DAILY    sucralfate (CARAFATE) tablet 1 g  1 g Oral AC&HS    midodrine (PROAMITINE) tablet 2.5 mg  2.5 mg Oral TID WITH MEALS    levothyroxine (SYNTHROID) tablet 25 mcg  25 mcg Oral ACB    pantoprazole (PROTONIX) tablet 40 mg  40 mg Oral ACB&D    thiamine (B-1) 200 mg in 0.9% sodium chloride 50 mL IVPB  200 mg IntraVENous DAILY    sodium chloride (NS) flush 5-10 mL  5-10 mL IntraVENous Q8H    sodium chloride (NS) flush 5-10 mL  5-10 mL IntraVENous PRN    LORazepam (ATIVAN) tablet 1 mg  1 mg Oral Q1H PRN    Or    LORazepam (ATIVAN) injection 1 mg  1 mg IntraVENous Q1H PRN    LORazepam (ATIVAN) tablet 2 mg  2 mg Oral Q1H PRN    Or    LORazepam (ATIVAN) injection 2 mg  2 mg IntraVENous Q1H PRN    LORazepam (ATIVAN) injection 3 mg  3 mg IntraVENous Q15MIN PRN    ondansetron (ZOFRAN) injection 4 mg  4 mg IntraVENous Q6H PRN    sodium chloride (NS) flush 5-10 mL  5-10 mL IntraVENous Q8H    sodium chloride (NS) flush 5-10 mL  5-10 mL IntraVENous PRN    rifAXIMin (XIFAXAN) tablet 550 mg  550 mg Oral BID    lactulose (CHRONULAC) solution 20 g  30 mL Oral BID    glucose chewable tablet 16 g  4 Tab Oral PRN    glucagon (GLUCAGEN) injection 1 mg  1 mg IntraMUSCular PRN    dextrose (D50W) injection syrg 12.5-25 g  25-50 mL IntraVENous PRN       Review of Symptoms: comprehensive ROS negative except above.    Objective:     Patient Vitals for the past 24 hrs:   Temp Pulse Resp BP SpO2   06/10/18 0819 97.5 °F (36.4 °C) 86 19 100/62 97 %   06/10/18 0332 97.9 °F (36.6 °C) 85 18 93/63 98 %   06/09/18 2310 97.8 °F (36.6 °C) (!) 101 18 91/59 96 %   06/09/18 1947 97.7 °F (36.5 °C) 90 18 101/62 97 %   06/09/18 1642 98.1 °F (36.7 °C) 91 19 97/55 93 %        Weight change: -3.629 kg (-8 lb)     06/08 1901 - 06/10 0700  In: 600 [P.O.:600]  Out: 1726 [Urine:1725]    Intake/Output Summary (Last 24 hours) at 06/10/18 1126  Last data filed at 06/10/18 0433   Gross per 24 hour   Intake              480 ml   Output              826 ml   Net             -346 ml     Physical Exam:   General: comfortable, no acute distress   HEENT  supple neck,  CVS: S1S2 heard,  no rub  RS: + air entry b/l,   Abd: Soft, Non tender,   Neuro:  awake, alert , CN II-XII are grossly intact  Extrm: + edema, no cyanosis, clubbing   Skin: no visible  Rash          Data Review:     LABS:   Hematology:   Recent Labs      06/09/18   0442   WBC  5.3   HGB  8.5*   HCT  24.2*     Chemistry:   Recent Labs      06/10/18   0200  06/09/18   0442  06/08/18   0300   BUN   --   12  14 CREA   --   0.94  0.98   CA   --   8.6  8.4*   K   --   3.9  3.5   NA   --   128*  131*   CL   --   91*  93*   CO2   --   31  30   PHOS  1.9*  2.4*  1.2*   GLU  213*  250*  200*            Procedures/imaging: see electronic medical records for all procedures, Xrays and details which were not copied into this note but were reviewed prior to creation of Plan          Assessment & Plan:     As above         Ashutosh Mcmillan MD  6/10/2018  10:56 AM

## 2018-06-10 NOTE — CDMP QUERY
Acute encephalopathy - due to Hepatic failure \"  Is noted       Please clarify  If  Hepatic failure is       Acute     Subacute    Thank you,   iHma Trejo RN  CCDS   [phone]  \"

## 2018-06-10 NOTE — PROGRESS NOTES
Problem: Falls - Risk of  Goal: *Absence of Falls  Document Ulysses Fall Risk and appropriate interventions in the flowsheet. Outcome: Progressing Towards Goal  Fall Risk Interventions:  Mobility Interventions: Assess mobility with egress test, Bed/chair exit alarm, Communicate number of staff needed for ambulation/transfer, Patient to call before getting OOB, PT Consult for mobility concerns, PT Consult for assist device competence, Utilize walker, cane, or other assitive device, Strengthening exercises (ROM-active/passive)    Mentation Interventions: Adequate sleep, hydration, pain control, Bed/chair exit alarm, Door open when patient unattended, Evaluate medications/consider consulting pharmacy, Eyeglasses and hearing aids, Familiar objects from home, Increase mobility, More frequent rounding, Room close to nurse's station, Toileting rounds, Update white board    Medication Interventions: Assess postural VS orthostatic hypotension, Bed/chair exit alarm, Evaluate medications/consider consulting pharmacy, Patient to call before getting OOB, Teach patient to arise slowly    Elimination Interventions: Bed/chair exit alarm, Call light in reach, Elevated toilet seat, Patient to call for help with toileting needs, Toilet paper/wipes in reach, Toileting schedule/hourly rounds, Urinal in reach    History of Falls Interventions: Bed/chair exit alarm, Consult care management for discharge planning, Door open when patient unattended, Evaluate medications/consider consulting pharmacy, Investigate reason for fall        Problem: Pressure Injury - Risk of  Goal: *Prevention of pressure injury  Document Santiago Scale and appropriate interventions in the flowsheet.    Outcome: Progressing Towards Goal  Pressure Injury Interventions:  Sensory Interventions: Assess changes in LOC, Assess need for specialty bed, Chair cushion, Discuss PT/OT consult with provider, Keep linens dry and wrinkle-free, Maintain/enhance activity level, Minimize linen layers, Monitor skin under medical devices, Pad between skin to skin, Pressure redistribution bed/mattress (bed type), Sit a 90-degree angle/use footstool if needed, Turn and reposition approx.  every two hours (pillows and wedges if needed), Use 30-degree side-lying position    Moisture Interventions: Absorbent underpads, Apply protective barrier, creams and emollients, Assess need for specialty bed, Check for incontinence Q2 hours and as needed, Limit adult briefs, Maintain skin hydration (lotion/cream), Minimize layers, Offer toileting Q_hr    Activity Interventions: Assess need for specialty bed, Chair cushion, Increase time out of bed, Pressure redistribution bed/mattress(bed type), PT/OT evaluation    Mobility Interventions: Assess need for specialty bed, Chair cushion, HOB 30 degrees or less, Pressure redistribution bed/mattress (bed type), PT/OT evaluation    Nutrition Interventions: Document food/fluid/supplement intake, Discuss nutritional consult with provider, Offer support with meals,snacks and hydration    Friction and Shear Interventions: Apply protective barrier, creams and emollients, Foam dressings/transparent film/skin sealants, HOB 30 degrees or less, Minimize layers, Sit at 90-degree angle

## 2018-06-10 NOTE — PROGRESS NOTES
Received report on pt.from Larkin Heimlich off going RN. Resting quietly in bed on rounds. Denies c/o pain or SOB at this time. family at bedside. Call light at side. No acute distress noted. Will cont to monitor for any changes in status. [phone] assisted up into recliner chair for dinner. Tolerated fair with assist of walker and myself. 1800 assisted into BR. noted a small amt of blood on pad and assessed pt for source. Noted a scant amt of blood drops coming from penis. Will cont to monitor. 1900 assisted to BR and then back to bed. No blood from penis noted at this time. 1920 Bedside and Verbal shift change report given to Chito Catalan RN (oncoming nurse) by Sue Claros RN (offgoing nurse). Report given with Luis E WADE and MAR.

## 2018-06-10 NOTE — PROGRESS NOTES
Problem: Self Care Deficits Care Plan (Adult)  Goal: *Acute Goals and Plan of Care (Insert Text)  Occupational Therapy Goals  Initiated 6/10/2018 within 7 day(s). 1.  Patient will perform lower body dressing with modified independence   2. Patient will perform toileting with modified independence. 3.  Patient will perform toilet transfer with modified independence. 4.  Patient will participate in upper extremity therapeutic exercise/activities with supervision/set-up for 8 minutes. 5.  Patient will utilize energy conservation techniques during functional activities with verbal cues. Outcome: Progressing Towards Goal  Occupational Therapy EVALUATION    Patient: Chuy Bush (58 y.o. male)  Date: 6/10/2018  Primary Diagnosis: symptomatic ascites  DKA (diabetic ketoacidoses) (HCC)  dx  Procedure(s) (LRB):  ENDOSCOPY WITH POSSIBLE with gold probe (N/A) 3 Days Post-Op   Precautions:  Fall    ASSESSMENT :  Pt presents with generalized weakness/deconditioning bialt UE 4-/5 and overall low endurance in ADL activity. Per his report, he presents below his baseline of MOD (I) SPC for ADL. Today required CGA sit>stand and transfer to chair in simulation of toilet transfer ([address]). Pt is able to complete seated ADL with set up, though lack of safe balance with bialt UE off walker in standing results in diminished independence in standing ADL . Two family members present and supportive. Pt maintains flat affect though verbally pleasant and thanks therapist.      Patient will benefit from skilled intervention to address the above impairments.   Patients rehabilitation potential is considered to be Good  Factors which may influence rehabilitation potential include:   [x]             None noted  []             Mental ability/status  []             Medical condition  []             Home/family situation and support systems  []             Safety awareness  []             Pain tolerance/management  []             Other: PLAN :  Recommendations and Planned Interventions:  [x]               Self Care Training                  [x]        Therapeutic Activities  [x]               Functional Mobility Training    []        Cognitive Retraining  [x]               Therapeutic Exercises           [x]        Endurance Activities  [x]               Balance Training                   [x]        Neuromuscular Re-Education  []               Visual/Perceptual Training     [x]   Home Safety Training  [x]               Patient Education                 [x]        Family Training/Education  []               Other (comment):    Frequency/Duration: Patient will be followed by occupational therapy 1-2 times per day/4-7 days per week to address goals. Discharge Recommendations: Home Health initial increased supervision  Further Equipment Recommendations for Discharge: tub shower bench. Walk in is too small for shower chair. Barriers to Learning/Limitations: None  Compensate with: visual, verbal, tactile, kinesthetic cues/model     PATIENT COMPLEXITY      Eval Complexity: History: LOW Complexity : Brief history review ; Examination: LOW Complexity : 1-3 performance deficits relating to physical, cognitive , or psychosocial skils that result in activity limitations and / or participation restrictions ; Decision Making:LOW Complexity : No comorbidities that affect functional and no verbal or physical assistance needed to complete eval tasks  Assessment: low Complexity     G-CODES:     Self Care  Current  CJ= 20-39%   Goal  CI= 1-19%. The severity rating is based on the Level of Assistance required for Functional Mobility and ADLs. SUBJECTIVE:   Patient stated I cant use my tub.     OBJECTIVE DATA SUMMARY:     Past Medical History:   Diagnosis Date    Alcohol abuse     Ascites     Hemochromatosis     Liver cirrhosis ([facility]     Seizures ([facility]     Thrombocytopenia ([facility]    History reviewed. No pertinent surgical history.   Prior Level of Function/Home Situation: independent/ mod (I) with SPC PRN  Home Situation  Home Environment: Private residence  # Steps to Enter: 5  One/Two Story Residence: Two story  # of Interior Steps: 13  Height of Each Step (in): 5 inches  Interior Rails: Left  Lift Chair Available: No  Living Alone: No  Support Systems: Family member(s)  Patient Expects to be Discharged to[de-identified] Private residence  Current DME Used/Available at Home: Cane, straight  Tub or Shower Type: Shower  []  Right hand dominant   []  Left hand dominant  Cognitive/Behavioral Status:  Neurologic State: Alert  Orientation Level: Oriented X4  Cognition: Follows commands       Skin: no noted concern    Edema: no noted conern    Vision/Perceptual:    Tracking:  (states he is seeing MD for cateract dx)                                Coordination:  Coordination: Within functional limits (BUE)            Balance:   fair, with fww (CGA)    Strength:    Strength: Generally decreased, functional (BUE)                Tone & Sensation:    Tone: Normal (BUE)  Sensation: Intact (BUE)                      Range of Motion:    AROM: Within functional limits (BUE)                         Functional Mobility and Transfers for ADLs:  Bed Mobility:     Supine to Sit: Contact guard assistance  Sit to Supine: Contact guard assistance     Transfers:  Sit to Stand: Contact guard assistance                Toilet Transfer : Contact guard assistance (simualtion commode transfer)                ADL Assessment:  Feeding: Setup    Oral Facial Hygiene/Grooming: Setup    Bathing: Minimum assistance    Upper Body Dressing: Setup    Lower Body Dressing: Contact guard assistance    Toileting: Contact guard assistance            Pain:  Pt reports achy pain/ discomfort prior to treatment.  chronic R hip/shoulder.  unscaled  Pt reports Pt reports achy pain/ discomfort prior to treatment.  chronic. unscaled    Activity Tolerance:   fair    Please refer to the flowsheet for vital signs taken during this treatment. After treatment:   [x] Patient left in no apparent distress sitting up in chair  [] Patient left in no apparent distress in bed  [x] Call bell left within reach  [] Nursing notified  [] Caregiver present  [] Bed alarm activated    COMMUNICATION/EDUCATION:   [] Home safety education was provided and the patient/caregiver indicated understanding. [x] Patient/family have participated as able in goal setting and plan of care. [] Patient/family agree to work toward stated goals and plan of care. [] Patient understands intent and goals of therapy, but is neutral about his/her participation. [] Patient is unable to participate in goal setting and plan of care.     Thank you for this referral.  Bridget Mccoy OT  Time Calculation: 18 mins

## 2018-06-10 NOTE — PROGRESS NOTES
[facility]  [facility], [address]    Progress Note    Patient: Freida Sanders MRN: [id number]  SSN: [id number]    YOB: 1960  Age: 62 y.o. Sex: male      Admit Date: 6/4/2018    LOS: 6 days     Chief complaint:my liver    Impression:     1. Decompensated alcoholic cirrhosis  2. Melena resolved - hemorrhagic gastritis on EGD, no varices  3. Ascites s/p paracentesis, fluid negative, abdomen more enlarged, mild distention today  4. Hemochromatosis, no phlembotomy X 1 year, hct to low to treat   5. Anemia multi factorial   6. Hypo natremia aggravated by high blood sugar   7. Hepatic encephalopathy improved     Plan:     1. Continue present liver management   2. Correct hyperglycemia       Subjective: The patient problems have improved. More alert sitting up and eating breakfast with no confusion abd pain or abd distention , no bleeding. Objective:     Vitals:    06/09/18 1947 06/09/18 2310 06/10/18 0332 06/10/18 0819   BP: 101/62 91/59 93/63 100/62   Pulse: 90 (!) 101 85 86   Resp: 18 18 18 19   Temp: 97.7 °F (36.5 °C) 97.8 °F (36.6 °C) 97.9 °F (36.6 °C) 97.5 °F (36.4 °C)   SpO2: 97% 96% 98% 97%   Weight:   87.2 kg (192 lb 3.2 oz)    Height:            Physical Exam:   GENERAL: alert, cooperative, no distress, appears stated age  ABDOMEN: soft, non-tender.  Bowel sounds normal. No masses,  no organomegaly, minimal distention   NEUROLOGIC: negative findings: alert, oriented x3    Lab/Data Review:  BMP:   Lab Results   Component Value Date/Time     (H) 06/10/2018 02:00 AM     CMP:   Lab Results   Component Value Date/Time     (H) 06/10/2018 02:00 AM    PHOS 1.9 (L) 06/10/2018 02:00 AM     CBC: No results found for: WBC, HGB, HCT, PLT, HGBEXT, HCTEXT, PLTEXT  COAGS:   Lab Results   Component Value Date/Time    PTP 24.7 (H) 06/10/2018 02:00 AM    INR 2.3 (H) 06/10/2018 02:00 AM     Liver Panel: No results found for: ALB, CBIL, TBIL, TP, GLOB, AGRAT, SGOT, ASTPOC, ALTPOC, ALT, GPT, AP       Principal Problem:    Hyponatremia (6/5/2018)    Active Problems:    Type 2 diabetes mellitus with hyperosmolarity ([address] (2/1/8897)      Alcoholic cirrhosis ([address] (6/5/2018)      Alcohol withdrawal syndrome, with delirium ([address] (6/5/2018)      Nausea (6/5/2018)      Other hemochromatosis (6/5/2018)      Weakness generalized (6/5/2018)      Ascites due to alcoholic hepatitis (1/6/4353)      Thrombocytopenia ([address] (6/5/2018)          Signed By: María Mas MD     Gladis 10, 2018

## 2018-06-10 NOTE — PROGRESS NOTES
[facility]  Progress Note    Patient: Cristiana Herrera Age: 62 y.o. : 1960 MR#: [id number] SSN: [id number]  Date/Time: 6/10/2018     Subjective:     Review of systems  Sitting in chair , appears coherant but according to patient's sister - patient is confused off and on . No CP   NO NVD  No SOB  NO Cough     Assessment/Plan:   1. Cirrhosis of Liver   2 Chronic ET OH abuse   3 ET OH liver disease   4 Anemia of chronic illness   5 hyponatremia - from ET OH   6 DM  7 Thrombocytopenia - secondary to Liver failure   8 Coagulopathy - secondary to Liver failure   9 H/o Hemochromatosis   10 Acute encephalopathy - due to Hepatic failure     PLAN   - Continues to be hyponatremic , asymptomatic   - Follow with renal   - For coagulopathy - Vit K   - Increase Glucotrol dose   - D/w patient sister and POA - Patient has in past decided to be DNR/DNI , and his wishes are still same   Will order DNR/DNI           Case discussed with:  [x]Patient  []Family  [x]Nursing  []Case Management  DVT Prophylaxis:  []Lovenox  []Hep SQ  [x]SCDs  []Coumadin   []On Heparin gtt          Objective:   VS:   Visit Vitals    /67 (BP 1 Location: Right arm, BP Patient Position: At rest)    Pulse 88    Temp 97.7 °F (36.5 °C)    Resp 20    Ht 5' 11\" (1.803 m)    Wt 87.2 kg (192 lb 3.2 oz)    SpO2 97%    BMI 26.81 kg/m2      Tmax/24hrs: Temp (24hrs), Av.8 °F (36.6 °C), Min:97.5 °F (36.4 °C), Max:98.1 °F (36.7 °C)  IOBRIEF    Intake/Output Summary (Last 24 hours) at 06/10/18 1228  Last data filed at 06/10/18 0433   Gross per 24 hour   Intake              480 ml   Output              651 ml   Net             -171 ml       General:  Alert, cooperative, no acute distress  ? Orientation   HEENT:  NC, Atraumatic. PERRLA, anicteric sclerae. Pulmonary:  Bilateral air entry present . No Wheezing/Rhonchi/Rales. Cardiovascular: Regular rate and Rhythm.   GI:  Obvious ascites , non tender   Extremities: No edema, cyanosis, clubbing. No calf tenderness. Psych:  Not anxious or agitated. Neurologic: Grossly - Motor and Sensory functions are intact .       Medications:   Current Facility-Administered Medications   Medication Dose Route Frequency    [START ON 6/11/2018] phytonadione (vitamin K1) (MEPHYTON) tablet 10 mg  10 mg Oral DAILY    [START ON 6/11/2018] glipiZIDE (GLUCOTROL) tablet 10 mg  10 mg Oral ACB    potassium, sodium phosphates (NEUTRA-PHOS) packet 1 Packet  1 Packet Oral QID    insulin glargine (LANTUS) injection 20 Units  20 Units SubCUTAneous QHS    traMADol (ULTRAM) tablet 50 mg  50 mg Oral Q6H PRN    spironolactone (ALDACTONE) tablet 50 mg  50 mg Oral DAILY    insulin lispro (HUMALOG) injection   SubCUTAneous AC&HS    folic acid (FOLVITE) 1 mg in 0.9% sodium chloride 50 mL ivpb   IntraVENous DAILY    sucralfate (CARAFATE) tablet 1 g  1 g Oral AC&HS    midodrine (PROAMITINE) tablet 2.5 mg  2.5 mg Oral TID WITH MEALS    levothyroxine (SYNTHROID) tablet 25 mcg  25 mcg Oral ACB    pantoprazole (PROTONIX) tablet 40 mg  40 mg Oral ACB&D    thiamine (B-1) 200 mg in 0.9% sodium chloride 50 mL IVPB  200 mg IntraVENous DAILY    sodium chloride (NS) flush 5-10 mL  5-10 mL IntraVENous Q8H    sodium chloride (NS) flush 5-10 mL  5-10 mL IntraVENous PRN    LORazepam (ATIVAN) tablet 1 mg  1 mg Oral Q1H PRN    Or    LORazepam (ATIVAN) injection 1 mg  1 mg IntraVENous Q1H PRN    LORazepam (ATIVAN) tablet 2 mg  2 mg Oral Q1H PRN    Or    LORazepam (ATIVAN) injection 2 mg  2 mg IntraVENous Q1H PRN    LORazepam (ATIVAN) injection 3 mg  3 mg IntraVENous Q15MIN PRN    ondansetron (ZOFRAN) injection 4 mg  4 mg IntraVENous Q6H PRN    sodium chloride (NS) flush 5-10 mL  5-10 mL IntraVENous Q8H    sodium chloride (NS) flush 5-10 mL  5-10 mL IntraVENous PRN    rifAXIMin (XIFAXAN) tablet 550 mg  550 mg Oral BID    lactulose (CHRONULAC) solution 20 g  30 mL Oral BID    glucose chewable tablet 16 g  4 Tab Oral PRN    glucagon (GLUCAGEN) injection 1 mg  1 mg IntraMUSCular PRN    dextrose (D50W) injection syrg 12.5-25 g  25-50 mL IntraVENous PRN       Labs:    Recent Labs      06/09/18   0442   WBC  5.3   HGB  8.5*   HCT  24.2*   PLT  45*     Recent Labs      06/10/18   1130  06/10/18   0200  06/09/18   0442  06/08/18   0532  06/08/18   0300   NA  126*   --   128*   --   131*   K  4.2   --   3.9   --   3.5   CL  89*   --   91*   --   93*   CO2  31   --   31   --   30   GLU  237*  213*  250*   --   200*   BUN  12   --   12   --   14   CREA  0.96   --   0.94   --   0.98   CA  8.6   --   8.6   --   8.4*   MG   --    --    --    --   2.2   PHOS   --   1.9*  2.4*   --   1.2*   INR   --   2.3*  2.4*  2.3*   --          Signed By: Robin Huynh MD     Gladis 10, 2018

## 2018-06-10 NOTE — PROGRESS NOTES
Received report on pt.from off going RN. Resting quietly in bed on rounds. Denies c/o pain or SOB at this time. No acute distress noted. Call bell at side. Will cont to monitor for any changes in status. 1500 pt was assisted into BR and full bath given. Hair washed. desirae well. Assisted back into bed to rest prior to dinner. 1700 assisted up into chair for dinner. desirae well. 1800 remains in chair. Family at bedside. 1910 Bedside and Verbal shift change report given to [facility] (oncoming nurse) by Gideon Dia RN (offgoing nurse). Report given with Luis E WADE and MAR.

## 2018-06-11 LAB
ANION GAP SERPL CALC-SCNC: 5 MMOL/L (ref 3–18)
BACTERIA SPEC CULT: NORMAL
BACTERIA SPEC CULT: NORMAL
BUN SERPL-MCNC: 13 MG/DL (ref 7–18)
BUN/CREAT SERPL: 14 (ref 12–20)
CALCIUM SERPL-MCNC: 8.3 MG/DL (ref 8.5–10.1)
CHLORIDE SERPL-SCNC: 90 MMOL/L (ref 100–108)
CO2 SERPL-SCNC: 32 MMOL/L (ref 21–32)
CREAT SERPL-MCNC: 0.96 MG/DL (ref 0.6–1.3)
GLUCOSE BLD STRIP.AUTO-MCNC: 197 MG/DL (ref 70–110)
GLUCOSE BLD STRIP.AUTO-MCNC: 277 MG/DL (ref 70–110)
GLUCOSE BLD STRIP.AUTO-MCNC: 291 MG/DL (ref 70–110)
GLUCOSE BLD STRIP.AUTO-MCNC: 334 MG/DL (ref 70–110)
GLUCOSE SERPL-MCNC: 173 MG/DL (ref 74–99)
INR PPP: 2.2 (ref 0.8–1.2)
PHOSPHATE SERPL-MCNC: 2.3 MG/DL (ref 2.5–4.9)
POTASSIUM SERPL-SCNC: 4.4 MMOL/L (ref 3.5–5.5)
PROTHROMBIN TIME: 23.4 SEC (ref 11.5–15.2)
SERVICE CMNT-IMP: NORMAL
SERVICE CMNT-IMP: NORMAL
SODIUM SERPL-SCNC: 127 MMOL/L (ref 136–145)

## 2018-06-11 PROCEDURE — 74011250637 HC RX REV CODE- 250/637: Performed by: INTERNAL MEDICINE

## 2018-06-11 PROCEDURE — 82962 GLUCOSE BLOOD TEST: CPT

## 2018-06-11 PROCEDURE — 97530 THERAPEUTIC ACTIVITIES: CPT

## 2018-06-11 PROCEDURE — 85610 PROTHROMBIN TIME: CPT | Performed by: INTERNAL MEDICINE

## 2018-06-11 PROCEDURE — 65270000029 HC RM PRIVATE

## 2018-06-11 PROCEDURE — 74011250637 HC RX REV CODE- 250/637: Performed by: PHYSICIAN ASSISTANT

## 2018-06-11 PROCEDURE — 74011000258 HC RX REV CODE- 258: Performed by: INTERNAL MEDICINE

## 2018-06-11 PROCEDURE — 80048 BASIC METABOLIC PNL TOTAL CA: CPT | Performed by: INTERNAL MEDICINE

## 2018-06-11 PROCEDURE — 97116 GAIT TRAINING THERAPY: CPT

## 2018-06-11 PROCEDURE — 36592 COLLECT BLOOD FROM PICC: CPT

## 2018-06-11 PROCEDURE — 84100 ASSAY OF PHOSPHORUS: CPT | Performed by: INTERNAL MEDICINE

## 2018-06-11 PROCEDURE — 74011636637 HC RX REV CODE- 636/637: Performed by: INTERNAL MEDICINE

## 2018-06-11 PROCEDURE — 74011250636 HC RX REV CODE- 250/636: Performed by: INTERNAL MEDICINE

## 2018-06-11 PROCEDURE — 74011000250 HC RX REV CODE- 250: Performed by: INTERNAL MEDICINE

## 2018-06-11 PROCEDURE — 97110 THERAPEUTIC EXERCISES: CPT

## 2018-06-11 PROCEDURE — 97535 SELF CARE MNGMENT TRAINING: CPT

## 2018-06-11 RX ORDER — FUROSEMIDE 20 MG/1
20 TABLET ORAL DAILY
Status: DISCONTINUED | OUTPATIENT
Start: 2018-06-11 | End: 2018-06-12

## 2018-06-11 RX ORDER — LANOLIN ALCOHOL/MO/W.PET/CERES
100 CREAM (GRAM) TOPICAL DAILY
Status: DISCONTINUED | OUTPATIENT
Start: 2018-06-12 | End: 2018-06-13 | Stop reason: HOSPADM

## 2018-06-11 RX ORDER — FOLIC ACID 1 MG/1
1 TABLET ORAL DAILY
Status: DISCONTINUED | OUTPATIENT
Start: 2018-06-12 | End: 2018-06-13 | Stop reason: HOSPADM

## 2018-06-11 RX ORDER — SODIUM,POTASSIUM PHOSPHATES 280-250MG
1 POWDER IN PACKET (EA) ORAL 2 TIMES DAILY
Status: DISCONTINUED | OUTPATIENT
Start: 2018-06-11 | End: 2018-06-12

## 2018-06-11 RX ORDER — GLIPIZIDE 10 MG/1
10 TABLET ORAL
Status: DISCONTINUED | OUTPATIENT
Start: 2018-06-11 | End: 2018-06-13 | Stop reason: HOSPADM

## 2018-06-11 RX ORDER — TRAMADOL HYDROCHLORIDE 50 MG/1
50 TABLET ORAL
Status: DISCONTINUED | OUTPATIENT
Start: 2018-06-11 | End: 2018-06-13 | Stop reason: HOSPADM

## 2018-06-11 RX ORDER — MIDODRINE HYDROCHLORIDE 2.5 MG/1
5 TABLET ORAL 2 TIMES DAILY WITH MEALS
Status: DISCONTINUED | OUTPATIENT
Start: 2018-06-11 | End: 2018-06-12

## 2018-06-11 RX ADMIN — THIAMINE HYDROCHLORIDE 200 MG: 100 INJECTION, SOLUTION INTRAMUSCULAR; INTRAVENOUS at 13:31

## 2018-06-11 RX ADMIN — POTASSIUM & SODIUM PHOSPHATES POWDER PACK 280-160-250 MG 1 PACKET: 280-160-250 PACK at 09:48

## 2018-06-11 RX ADMIN — Medication 10 ML: at 21:47

## 2018-06-11 RX ADMIN — PANTOPRAZOLE SODIUM 40 MG: 40 TABLET, DELAYED RELEASE ORAL at 19:38

## 2018-06-11 RX ADMIN — Medication 10 ML: at 05:56

## 2018-06-11 RX ADMIN — MIDODRINE HYDROCHLORIDE 2.5 MG: 2.5 TABLET ORAL at 09:48

## 2018-06-11 RX ADMIN — INSULIN LISPRO 9 UNITS: 100 INJECTION, SOLUTION INTRAVENOUS; SUBCUTANEOUS at 13:26

## 2018-06-11 RX ADMIN — SUCRALFATE 1 G: 1 TABLET ORAL at 09:48

## 2018-06-11 RX ADMIN — TRAMADOL HYDROCHLORIDE 50 MG: 50 TABLET, FILM COATED ORAL at 09:48

## 2018-06-11 RX ADMIN — INSULIN LISPRO 12 UNITS: 100 INJECTION, SOLUTION INTRAVENOUS; SUBCUTANEOUS at 21:46

## 2018-06-11 RX ADMIN — MIDODRINE HYDROCHLORIDE 2.5 MG: 2.5 TABLET ORAL at 13:22

## 2018-06-11 RX ADMIN — GLIPIZIDE 10 MG: 10 TABLET ORAL at 09:48

## 2018-06-11 RX ADMIN — MIDODRINE HYDROCHLORIDE 5 MG: 2.5 TABLET ORAL at 19:37

## 2018-06-11 RX ADMIN — PHYTONADIONE 10 MG: 10 INJECTION, EMULSION INTRAMUSCULAR; INTRAVENOUS; SUBCUTANEOUS at 13:34

## 2018-06-11 RX ADMIN — RIFAXIMIN 550 MG: 550 TABLET ORAL at 09:48

## 2018-06-11 RX ADMIN — POTASSIUM & SODIUM PHOSPHATES POWDER PACK 280-160-250 MG 1 PACKET: 280-160-250 PACK at 19:37

## 2018-06-11 RX ADMIN — PANTOPRAZOLE SODIUM 40 MG: 40 TABLET, DELAYED RELEASE ORAL at 09:48

## 2018-06-11 RX ADMIN — SUCRALFATE 1 G: 1 TABLET ORAL at 13:22

## 2018-06-11 RX ADMIN — LACTULOSE 20 G: 20 SOLUTION ORAL at 09:47

## 2018-06-11 RX ADMIN — FOLIC ACID: 5 INJECTION, SOLUTION INTRAMUSCULAR; INTRAVENOUS; SUBCUTANEOUS at 13:33

## 2018-06-11 RX ADMIN — INSULIN LISPRO 9 UNITS: 100 INJECTION, SOLUTION INTRAVENOUS; SUBCUTANEOUS at 17:40

## 2018-06-11 RX ADMIN — INSULIN LISPRO 3 UNITS: 100 INJECTION, SOLUTION INTRAVENOUS; SUBCUTANEOUS at 09:49

## 2018-06-11 RX ADMIN — FUROSEMIDE 20 MG: 20 TABLET ORAL at 19:37

## 2018-06-11 RX ADMIN — SPIRONOLACTONE 50 MG: 25 TABLET ORAL at 09:48

## 2018-06-11 RX ADMIN — LEVOTHYROXINE SODIUM 25 MCG: 25 TABLET ORAL at 09:48

## 2018-06-11 RX ADMIN — RIFAXIMIN 550 MG: 550 TABLET ORAL at 19:37

## 2018-06-11 RX ADMIN — POTASSIUM & SODIUM PHOSPHATES POWDER PACK 280-160-250 MG 1 PACKET: 280-160-250 PACK at 13:22

## 2018-06-11 RX ADMIN — LACTULOSE 20 G: 20 SOLUTION ORAL at 19:38

## 2018-06-11 RX ADMIN — GLIPIZIDE 10 MG: 10 TABLET ORAL at 19:37

## 2018-06-11 NOTE — PROGRESS NOTES
Problem: Falls - Risk of  Goal: *Absence of Falls  Document Ulysses Fall Risk and appropriate interventions in the flowsheet. Outcome: Progressing Towards Goal  Fall Risk Interventions:  Mobility Interventions: Bed/chair exit alarm, Patient to call before getting OOB, Utilize walker, cane, or other assitive device    Mentation Interventions: Adequate sleep, hydration, pain control, Bed/chair exit alarm, Increase mobility, Update white board    Medication Interventions: Patient to call before getting OOB    Elimination Interventions: Bed/chair exit alarm, Call light in reach, Patient to call for help with toileting needs, Urinal in reach    History of Falls Interventions: Bed/chair exit alarm, Door open when patient unattended        Problem: Pressure Injury - Risk of  Goal: *Prevention of pressure injury  Document Santiago Scale and appropriate interventions in the flowsheet. Outcome: Progressing Towards Goal  Pressure Injury Interventions:  Sensory Interventions: Assess changes in LOC, Assess need for specialty bed, Chair cushion, Discuss PT/OT consult with provider, Keep linens dry and wrinkle-free, Maintain/enhance activity level, Minimize linen layers, Monitor skin under medical devices, Pad between skin to skin, Pressure redistribution bed/mattress (bed type), Sit a 90-degree angle/use footstool if needed, Turn and reposition approx.  every two hours (pillows and wedges if needed), Use 30-degree side-lying position    Moisture Interventions: Absorbent underpads    Activity Interventions: Increase time out of bed, Pressure redistribution bed/mattress(bed type)    Mobility Interventions: Pressure redistribution bed/mattress (bed type)    Nutrition Interventions: Document food/fluid/supplement intake, Offer support with meals,snacks and hydration    Friction and Shear Interventions: Apply protective barrier, creams and emollients, HOB 30 degrees or less

## 2018-06-11 NOTE — PROGRESS NOTES
[facility] Hospitalist Group  Progress Note    Patient: Lynnette People Age: 62 y.o. : 1960 MR#: [id number] SSN: [id number]  Date/Time: 2018     Subjective:     Feeling better. No chest or abdominal pain. Fatigue improving. No SOB or cough. Walked with me and PT in hallway and took some steps. Family is at bedside - feel uncomfortable to take him and wants him to go to SNF. Assessment:     1. Cirrhosis of Liver due to # 2  2. Chronic alcohol use  3. Generalized weakness, improving  4. Anemia of chronic illness, stable   5. Hyponatremia in setting of chronic hyponatremia due to liver cirrhosis and beer potomania  6. DM with A1c of 7.2  7. Thrombocytopenia secondary to Liver failure   8. Elevated bilirubin/ jaundice, thrombocytopenia, coagulopathy in setting of ETOH cirrhosis. 9. H/o hemochromatosis. 10. Ascites s/p paracentesis  11. Hx osteomyelitis, right 3rd toe  12. Hypothyroidism     PLAN     Cont current management  Change IV meds to PO medications  Discharge plan - can be discharged to [facility] vs SNF tomorrow     Case discussed with:  [x]Patient  [x]Family  [x]Nursing  [x]Case Management  DVT Prophylaxis:  []Lovenox  []Hep SQ  [x]SCDs  []Coumadin   []On Heparin gtt      Objective:     VS:   Visit Vitals    BP 95/60 (BP 1 Location: Right arm, BP Patient Position: At rest)    Pulse 95    Temp 97.4 °F (36.3 °C)    Resp 16    Ht 5' 11\" (1.803 m)    Wt 87.6 kg (193 lb 1.6 oz)    SpO2 95%    BMI 26.93 kg/m2      Tmax/24hrs: Temp (24hrs), Av.8 °F (36.6 °C), Min:97.4 °F (36.3 °C), Max:98.1 °F (36.7 °C)  IOBRIEF    Intake/Output Summary (Last 24 hours) at 18 1537  Last data filed at 18 0406   Gross per 24 hour   Intake                0 ml   Output              801 ml   Net             -801 ml       General:  Alert, cooperative, no acute distress    Pulmonary:  Bilateral air entry present . No Wheezes  Cardiovascular: Regular rate and Rhythm.   GI:  Obvious ascites , non tender, BS +  Extremities:  No PEDAL edema. Neurologic: Grossly - Motor and Sensory functions are intact .       Labs:    Recent Labs      06/09/18   0442   WBC  5.3   HGB  8.5*   HCT  24.2*   PLT  45*     Recent Labs      06/11/18   0410  06/10/18   1130  06/10/18   0200  06/09/18   0442   NA  127*  126*   --   128*   K  4.4  4.2   --   3.9   CL  90*  89*   --   91*   CO2  32  31   --   31   GLU  173*  237*  213*  250*   BUN  13  12   --   12   CREA  0.96  0.96   --   0.94   CA  8.3*  8.6   --   8.6   PHOS  2.3*   --   1.9*  2.4*   INR  2.2*   --   2.3*  2.4*         Signed By: Scott Valenzuela MD     June 11, 2018

## 2018-06-11 NOTE — PROGRESS NOTES
RENAL DAILY PROGRESS NOTE            60y M with cirrhosis , following for hyponatremia   Subjective:     Complaint:    Overnight events noted  Looks comfortable    Phos remain low    no nausea, vomiting, chest pain, short of breath, cough, seizure. IMPRESSION:   Hyponatremia , cirrhosis , acute on chronic He had very higher urine osmolarity , very low serum sodium on arrival. He had osmolar gap also on arrival. Suspect his hyponatremia from heavy etoh use and poor dieatry intake. Chronic hyponatremia ( sodium was at 134 in jan'2018)  Hypophosphatemia   Hypokalemia   Hyperglycemia   Cirrhosis , h/o etoh abuse   Anemia/thrombocytopenia   Hypontension, on midodrine    PLAN:   Improving sodium , given his history of cirrhosis, okay to discharge with current sodium. Discussed risk of fall. Will be available if any question or concern. Avoid nsaids   Continue  neturaphos for today.            Current Facility-Administered Medications   Medication Dose Route Frequency    midodrine (PROAMITINE) tablet 5 mg  5 mg Oral BID WITH MEALS    glipiZIDE (GLUCOTROL) tablet 10 mg  10 mg Oral ACB&D    [START ON 6/12/2018] phytonadione (VITAMIN K) 1 mg/mL oral solution 10 mg  10 mg Oral ONCE    [START ON 6/12/2018] thiamine (B-1) tablet 100 mg  100 mg Oral DAILY    furosemide (LASIX) tablet 20 mg  20 mg Oral DAILY    [START ON 1/01/0941] folic acid (FOLVITE) tablet 1 mg  1 mg Oral DAILY    traMADol (ULTRAM) tablet 50 mg  50 mg Oral BID PRN    potassium, sodium phosphates (NEUTRA-PHOS) packet 1 Packet  1 Packet Oral BID    spironolactone (ALDACTONE) tablet 50 mg  50 mg Oral DAILY    insulin lispro (HUMALOG) injection   SubCUTAneous AC&HS    levothyroxine (SYNTHROID) tablet 25 mcg  25 mcg Oral ACB    pantoprazole (PROTONIX) tablet 40 mg  40 mg Oral ACB&D    sodium chloride (NS) flush 5-10 mL  5-10 mL IntraVENous Q8H    sodium chloride (NS) flush 5-10 mL  5-10 mL IntraVENous PRN    ondansetron (ZOFRAN) injection 4 mg  4 mg IntraVENous Q6H PRN    sodium chloride (NS) flush 5-10 mL  5-10 mL IntraVENous Q8H    sodium chloride (NS) flush 5-10 mL  5-10 mL IntraVENous PRN    rifAXIMin (XIFAXAN) tablet 550 mg  550 mg Oral BID    lactulose (CHRONULAC) solution 20 g  30 mL Oral BID    glucose chewable tablet 16 g  4 Tab Oral PRN    glucagon (GLUCAGEN) injection 1 mg  1 mg IntraMUSCular PRN    dextrose (D50W) injection syrg 12.5-25 g  25-50 mL IntraVENous PRN       Review of Symptoms: comprehensive ROS negative except above. Objective:     Patient Vitals for the past 24 hrs:   Temp Pulse Resp BP SpO2   06/11/18 0834 97.4 °F (36.3 °C) 95 16 95/60 95 %   06/11/18 0322 98.1 °F (36.7 °C) 90 20 105/58 95 %   06/10/18 2346 98.1 °F (36.7 °C) 89 20 101/58 98 %   06/10/18 1920 97.6 °F (36.4 °C) 97 20 103/65 95 %        Weight change: 0.408 kg (14.4 oz)     06/09 1901 - 06/11 0700  In: 170 [P.O.:120;  I.V.:50]  Out: 1601 [Urine:1600]    Intake/Output Summary (Last 24 hours) at 06/11/18 1559  Last data filed at 06/11/18 0406   Gross per 24 hour   Intake                0 ml   Output              700 ml   Net             -700 ml     Physical Exam:   General: comfortable, no acute distress   HEENT  supple neck,  CVS: S1S2 heard,  no rub  RS: + air entry b/l,   Abd: Soft, Non tender,   Neuro:  awake, alert , CN II-XII are grossly intact  Extrm: + edema, no cyanosis, clubbing   Skin: no visible  Rash          Data Review:     LABS:   Hematology:   Recent Labs      06/09/18   0442   WBC  5.3   HGB  8.5*   HCT  24.2*     Chemistry:   Recent Labs      06/11/18   0410  06/10/18   1130  06/10/18   0200  06/09/18   0442   BUN  13  12   --   12   CREA  0.96  0.96   --   0.94   CA  8.3*  8.6   --   8.6   K  4.4  4.2   --   3.9   NA  127*  126*   --   128*   CL  90*  89*   --   91*   CO2  32  31   --   31   PHOS  2.3*   --   1.9*  2.4*   GLU  173*  237*  213*  250*            Procedures/imaging: see electronic medical records for all procedures, Xrays and details which were not copied into this note but were reviewed prior to creation of Plan          Assessment & Plan:     As above         Chantal Javier MD  6/11/2018  10:56 AM

## 2018-06-11 NOTE — PROGRESS NOTES
conducted a Follow up consultation and Spiritual Assessment for Leida Bella, who is a 62 y. o.,male. The  provided the following Interventions:  Continued the relationship of care and support. Listened empathically. Chart reviewed. The following outcomes were achieved:  Family expressed gratitude for 's visit. Assessment:  There are no further spiritual or Restorationist issues which require Spiritual Care Services interventions at this time. Plan:  Chaplains will continue to follow and will provide pastoral care on an as needed/requested basis.  recommends bedside caregivers page  on duty if patient shows signs of acute spiritual or emotional distress.      [facility] Certified [address] [place] [address]   [phone]

## 2018-06-11 NOTE — PROGRESS NOTES
Problem: Mobility Impaired (Adult and Pediatric)  Goal: *Acute Goals and Plan of Care (Insert Text)  Physical Therapy Goals  Initiated 6/8/2018 and to be accomplished within 7 day(s)  1. Patient will move from supine to sit and sit to supine , scoot up and down and roll side to side in bed with modified independence. 2.  Patient will transfer from bed to chair and chair to bed with modified independence using the least restrictive device. 3.  Patient will perform sit to stand with modified independence. 4.  Patient will ambulate with supervision/set-up for >150 feet with the least restrictive device. 5.  Patient will ascend/descend 7 stairs with 1 handrail(s) with supervision/set-up. Outcome: Progressing Towards Goal  physical Therapy TREATMENT    Patient: Lynnette Ariza (10 y.o. male)  Date: 6/11/2018  Diagnosis: symptomatic ascites  DKA (diabetic ketoacidoses) (HCC)  dx Hyponatremia  Procedure(s) (LRB):  ENDOSCOPY WITH POSSIBLE with gold probe (N/A) 4 Days Post-Op  Precautions: Fall   Chart, physical therapy assessment, plan of care and goals were reviewed. ASSESSMENT:  Patient is cleared by EMR review for PT, and patient consents to therapy. Pt in chair mode in bed. Pt performed supine to sit CGA with cues for log rolling to decrease pain in stomach and back. Sit to stands min A for balance and cues for hand placement. Gait 60 feet with RW with very short step length and shuffling steps. Cues during gait for larger and slower steps with CGA for safety. Stair training 7 steps with B railings initially ascending then 1 railing for 2-3 steps with min A. Descending stairs with B railings cues for only one rail with pt unable to perform today and required mod A for descending stairs. Educated pt on safety and needs assistance with gait and stairs at this time. Educated family on assistance needed with gait and stairs. Pt ended therapy sitting in recliner with all needs met.  Educated OOB 3-5 times a day with nursing assistance and therex during the day. Progression toward goals:  [x]      Improving appropriately and progressing toward goals  []      Improving slowly and progressing toward goals  []      Not making progress toward goals and plan of care will be adjusted     PLAN:  Patient continues to benefit from skilled intervention to address the above impairments to increase functional independence. Continue treatment per established plan of care. Discharge Recommendations:  SNF at this time due to safety especially with gait and stairs  Further Equipment Recommendations for Discharge:  RW and shower chair     SUBJECTIVE:   Patient stated I've been up with help.      OBJECTIVE DATA SUMMARY:   Critical Behavior:  Neurologic State: Alert  Orientation Level: Oriented X4  Cognition: Follows commands  Functional Mobility Training:  Bed Mobility:  Supine to Sit: Contact guard assistance  Scooting: Contact guard assistance  Transfers:  Sit to Stand: Minimum assistance  Bed to Chair: Contact guard assistance (w/RW)  Balance:  Sitting: Intact  Standing: Impaired; With support  Standing - Static: Fair  Standing - Dynamic : Fair  Ambulation/Gait Training:  Distance (ft): 60 Feet (ft)  Assistive Device: Walker, rolling  Ambulation - Level of Assistance: Contact guard assistance  Gait Abnormalities: Decreased step clearance;Shuffling gait  Base of Support: Narrowed; Center of gravity altered  Speed/Mai: Fluctuations; Shuffled  Step Length: Left shortened;Right shortened  Stairs:  Rail Use: Both (7 steps with step to pattern, min/mod A)  Therapeutic Exercises:   Seated B LE x 10 reps ankle pumps, LAQ, marching, and hip abd/adduction. Pain:  Pre: 5/10 stomach and lower back  Post: 5/10 stomach and lower back  Activity Tolerance:   good  Please refer to the flowsheet for vital signs taken during this treatment.   After treatment:   [x] Patient left in no apparent distress sitting up in chair  [] Patient left in no apparent distress in bed  [x] Call bell left within reach  [] Nursing notified  [x] Caregiver present  [] Bed alarm activated  [x] Personal items in reach      Yoav Garay, PT, DPT   Time Calculation: 38 mins    Mobility  Current  CK= 40-59%   Goal  CI= 1-19%. The severity rating is based on the Level of Assistance required for Functional Mobility and ADLs.

## 2018-06-11 NOTE — PROGRESS NOTES
Problem: Self Care Deficits Care Plan (Adult)  Goal: *Acute Goals and Plan of Care (Insert Text)  Occupational Therapy Goals  Initiated 6/10/2018 within 7 day(s). 1.  Patient will perform lower body dressing with modified independence   2. Patient will perform toileting with modified independence. 3.  Patient will perform toilet transfer with modified independence. 4.  Patient will participate in upper extremity therapeutic exercise/activities with supervision/set-up for 8 minutes. 5.  Patient will utilize energy conservation techniques during functional activities with verbal cues. Outcome: Progressing Towards Goal  Occupational Therapy TREATMENT    Patient: Anand Gaines (66 y.o. male)  Date: 6/11/2018  Diagnosis: symptomatic ascites  DKA (diabetic ketoacidoses) (HCC)  dx Hyponatremia  Procedure(s) (LRB):  ENDOSCOPY WITH POSSIBLE with gold probe (N/A) 4 Days Post-Op  Precautions: Fall  Chart, occupational therapy assessment, plan of care, and goals were reviewed. PLOF: Independent    ASSESSMENT:  Pt c/o abdominal pain, but agreeable to OOB activity. Pt requires increase time to maneuver to EOB and vc's for pacing w/functional transfer to standing w/RW. Pt requires increase time w/functional mobility to bathroom and vc's for safety w/RW. Decrease dynamic standing balance requires Mod Assist w/clothing mgt and toileting ADL. Pt poor activity tolerance requires energy conservation techniques w/ADL grooming tasks at sinkside. Pt left in chair w/all needs within reach. Pt agreeable to short SNF stay upon discharge.   EDUCATION Pt educated on importance OOB and encouraged OOB for all meals  Progression toward goals:  []          Improving appropriately and progressing toward goals  [x]          Improving slowly and progressing toward goals  []          Not making progress toward goals and plan of care will be adjusted     PLAN:  Patient continues to benefit from skilled intervention to address the above impairments. Continue treatment per established plan of care. Discharge Recommendations:  Keagan Serra  Further Equipment Recommendations for Discharge:  shower chair and rolling walker     SUBJECTIVE:   Patient stated It's hard to believe that you can get that weak that fast.    OBJECTIVE DATA SUMMARY:       Cognitive/Behavioral Status:  Neurologic State: Alert  Orientation Level: Oriented X4  Cognition: Follows commands     Functional Mobility and Transfers for ADLs:   Bed Mobility:  Supine to Sit: Contact guard assistance   Transfers:  Sit to Stand: Minimum assistance  Bed to Chair: Contact guard assistance (w/RW)   Toilet Transfer : Contact guard assistance (w/grab bar)   Bathroom Mobility: Contact guard assistance (w/RW)   Balance:  Sitting: Intact  Standing: Impaired; With support  Standing - Static: Fair  Standing - Dynamic : Fair  ADL Intervention:  Grooming  Washing Face: Supervision/set-up  Washing Hands: Supervision/set-up  Brushing Teeth: Supervision/set-up  Brushing/Combing Hair: Supervision/set-up    Upper Body [address]: Supervision/ set-up    Toileting  Toileting Assistance: Moderate assistance  Bladder Hygiene: Supervision/set-up (seated)  Clothing Management: Moderate assistance    Pain:  Pre Treatment:7  Post Treatment:7  Pain Scale 1: Numeric (0 - 10)  Pain Intensity 1: 7  Pain Location 1: Abdomen  Pain Orientation 1: Right  Pain Intervention(s) 1: Repositioned    Activity Tolerance:    Fair, pt fatigues easily    Please refer to the flowsheet for vital signs taken during this treatment.   After treatment:   [x]  Patient left in no apparent distress sitting up in chair  []  Patient left in no apparent distress in bed  [x]  Call bell left within reach  []  Nursing notified  [x]  Family, brother-in-law, present  []  Bed alarm activated    LETICIA Emanuel  Time Calculation: 25 mins

## 2018-06-11 NOTE — PROGRESS NOTES
Received report on pt.from off going RN. Resting quietly in bed on rounds. Denies c/o pain or SOB at this time. No acute distress noted. Call bell at side. Will cont to monitor for any changes in status. 1945  Bedside and Verbal shift change report given to [facility] 72. (oncoming nurse) by Gideon Dia RN (offgoing nurse). Report given with Luis E WADE and MAR.

## 2018-06-11 NOTE — PROGRESS NOTES
New PT orders received. Pt already on caseload and PT will follow as appropriate. Acknowledged new orders.      Thank you,   Estelle Streeter, PT, DPT

## 2018-06-11 NOTE — PROGRESS NOTES
[email or web address]  [phone]    Gastroenterology follow up-Progress note    Impression:  1. Decompensated alcoholic cirrhosis  2. Melena resolved - hemorrhagic gastritis on EGD, no varices  3. Ascites s/p paracentesis, fluid negative, abdomen more enlarged, mild distention today  4. Hemochromatosis, no phlembotomy X 1 year, hct to low to treat   5. Anemia multi factorial   6. Hyponatremia aggravated by high blood sugar   7. Hepatic encephalopathy, acute, improved     Plan:  1. Continue present liver management   2. Follow up as outpatient   3. Will sign off-Thank you for this consultation and the opportunity to participate in the care of this patient. Please do not hesitate to call with any questions or concerns, or should event occur that may necessitate additional GI evaluation. Chief Complaint: abdominal pain and distention, liver disease       Subjective:  Anemia improving, No confusion or abdominal pain, abdominal distention mild and stable, no bleeding    ROS: Denies any fevers, chills, rash.      Eyes: conjunctiva normal, EOM normal   Neck: ROM normal, supple and trachea normal   Cardiovascular: heart normal, intact distal pulses, normal rate and regular rhythm   Pulmonary/Chest Wall: breath sounds normal and effort normal   Abdominal: appearance normal, bowel sounds normal and soft, non-acute, non-tender, minimal distension     Patient Active Problem List   Diagnosis Code    Type 2 diabetes mellitus with hyperosmolarity (HCC) S41.05    Alcoholic cirrhosis (HCC) R08.17    Alcohol withdrawal syndrome, with delirium (HCC) F10.231    Hyponatremia E87.1    Nausea R11.0    Other hemochromatosis E83.118    Weakness generalized R53.1    Ascites due to alcoholic hepatitis V09.00    Thrombocytopenia (HCC) D69.6         Visit Vitals    BP 95/60 (BP 1 Location: Right arm, BP Patient Position: At rest)    Pulse 95    Temp 97.4 °F (36.3 °C)    Resp 16    Ht 5' 11\" (1.803 m)    Wt 87.6 kg (193 lb 1.6 oz)    SpO2 95%    BMI 26.93 kg/m2           Intake/Output Summary (Last 24 hours) at 06/11/18 1222  Last data filed at 06/11/18 0406   Gross per 24 hour   Intake                0 ml   Output              951 ml   Net             -951 ml       CBC w/Diff    Lab Results   Component Value Date/Time    WBC 5.3 06/09/2018 04:42 AM    RBC 2.37 (L) 06/09/2018 04:42 AM    HGB 8.5 (L) 06/09/2018 04:42 AM    HCT 24.2 (L) 06/09/2018 04:42 AM    .1 (H) 06/09/2018 04:42 AM    MCH 35.9 (H) 06/09/2018 04:42 AM    MCHC 35.1 06/09/2018 04:42 AM    RDW 18.2 (H) 06/09/2018 04:42 AM    PLT 45 (L) 06/09/2018 04:42 AM    Lab Results   Component Value Date/Time    GRANS 63 06/07/2018 02:31 AM    LYMPH 22 06/07/2018 02:31 AM    EOS 0 06/07/2018 02:31 AM    BANDS 10 (H) 06/06/2018 04:20 AM    BASOS 0 06/07/2018 02:31 AM    MYELO 1 (H) 06/05/2018 01:30 AM      Basic Metabolic Profile   Recent Labs      06/11/18   0410   NA  127*   K  4.4   CL  90*   CO2  32   BUN  13   CA  8.3*   PHOS  2.3*        Hepatic Function    Lab Results   Component Value Date/Time    ALB 2.9 (L) 06/07/2018 02:31 AM    TP 5.8 (L) 06/07/2018 02:31 AM     06/07/2018 02:31 AM    Lab Results   Component Value Date/Time    SGOT 159 (H) 06/07/2018 02:31 AM          Coags   Recent Labs      06/11/18   0410  06/10/18   0200   PTP  23.4*  24.7*   INR  2.2*  2.3*               Tessa Horton NP    [facility]. [email or web address]  Phone: [phone]  Pager: [phone]

## 2018-06-11 NOTE — ROUTINE PROCESS
Bedside and Verbal shift change report given to Arleen Stiles RN   (oncoming nurse) by Michael Marcano RN (offgoing nurse). Report included the following information SBAR, Kardex, Intake/Output, MAR and Recent Results. Resting in bed. Call bell in reach. 0730 Bedside and Verbal shift change report given to Andrei Celaya RN (oncoming nurse) by Arleen Stiles RN   (offgoing nurse). Report included the following information SBAR, Kardex, Intake/Output, MAR and Recent Results.

## 2018-06-12 LAB
GLUCOSE BLD STRIP.AUTO-MCNC: 191 MG/DL (ref 70–110)
GLUCOSE BLD STRIP.AUTO-MCNC: 218 MG/DL (ref 70–110)
GLUCOSE BLD STRIP.AUTO-MCNC: 298 MG/DL (ref 70–110)
GLUCOSE BLD STRIP.AUTO-MCNC: 335 MG/DL (ref 70–110)
PHOSPHATE SERPL-MCNC: 2.6 MG/DL (ref 2.5–4.9)

## 2018-06-12 PROCEDURE — 97530 THERAPEUTIC ACTIVITIES: CPT

## 2018-06-12 PROCEDURE — 74011250637 HC RX REV CODE- 250/637: Performed by: INTERNAL MEDICINE

## 2018-06-12 PROCEDURE — 74011636637 HC RX REV CODE- 636/637: Performed by: INTERNAL MEDICINE

## 2018-06-12 PROCEDURE — 84100 ASSAY OF PHOSPHORUS: CPT | Performed by: INTERNAL MEDICINE

## 2018-06-12 PROCEDURE — 97535 SELF CARE MNGMENT TRAINING: CPT

## 2018-06-12 PROCEDURE — 74011250637 HC RX REV CODE- 250/637: Performed by: PHYSICIAN ASSISTANT

## 2018-06-12 PROCEDURE — 65270000029 HC RM PRIVATE

## 2018-06-12 PROCEDURE — 82962 GLUCOSE BLOOD TEST: CPT

## 2018-06-12 RX ORDER — THERA TABS 400 MCG
1 TAB ORAL DAILY
Status: DISCONTINUED | OUTPATIENT
Start: 2018-06-13 | End: 2018-06-13 | Stop reason: HOSPADM

## 2018-06-12 RX ORDER — SPIRONOLACTONE 25 MG/1
12.5 TABLET ORAL DAILY
Status: DISCONTINUED | OUTPATIENT
Start: 2018-06-13 | End: 2018-06-13 | Stop reason: HOSPADM

## 2018-06-12 RX ORDER — INSULIN GLARGINE 100 [IU]/ML
10 INJECTION, SOLUTION SUBCUTANEOUS ONCE
Status: COMPLETED | OUTPATIENT
Start: 2018-06-12 | End: 2018-06-12

## 2018-06-12 RX ORDER — MIDODRINE HYDROCHLORIDE 2.5 MG/1
2.5 TABLET ORAL 2 TIMES DAILY WITH MEALS
Status: DISCONTINUED | OUTPATIENT
Start: 2018-06-12 | End: 2018-06-12

## 2018-06-12 RX ORDER — MIDODRINE HYDROCHLORIDE 2.5 MG/1
2.5 TABLET ORAL 2 TIMES DAILY WITH MEALS
Status: DISCONTINUED | OUTPATIENT
Start: 2018-06-12 | End: 2018-06-13 | Stop reason: HOSPADM

## 2018-06-12 RX ADMIN — RIFAXIMIN 550 MG: 550 TABLET ORAL at 09:23

## 2018-06-12 RX ADMIN — GLIPIZIDE 10 MG: 10 TABLET ORAL at 17:49

## 2018-06-12 RX ADMIN — INSULIN LISPRO 3 UNITS: 100 INJECTION, SOLUTION INTRAVENOUS; SUBCUTANEOUS at 21:42

## 2018-06-12 RX ADMIN — LEVOTHYROXINE SODIUM 25 MCG: 25 TABLET ORAL at 09:24

## 2018-06-12 RX ADMIN — LACTULOSE 20 G: 20 SOLUTION ORAL at 17:49

## 2018-06-12 RX ADMIN — INSULIN LISPRO 6 UNITS: 100 INJECTION, SOLUTION INTRAVENOUS; SUBCUTANEOUS at 17:49

## 2018-06-12 RX ADMIN — SPIRONOLACTONE 50 MG: 25 TABLET ORAL at 09:24

## 2018-06-12 RX ADMIN — INSULIN GLARGINE 10 UNITS: 100 INJECTION, SOLUTION SUBCUTANEOUS at 11:55

## 2018-06-12 RX ADMIN — MIDODRINE HYDROCHLORIDE 5 MG: 2.5 TABLET ORAL at 09:23

## 2018-06-12 RX ADMIN — LACTULOSE 20 G: 20 SOLUTION ORAL at 09:24

## 2018-06-12 RX ADMIN — PHYTONADIONE 10 MG: 10 INJECTION, EMULSION INTRAMUSCULAR; INTRAVENOUS; SUBCUTANEOUS at 05:14

## 2018-06-12 RX ADMIN — PANTOPRAZOLE SODIUM 40 MG: 40 TABLET, DELAYED RELEASE ORAL at 17:49

## 2018-06-12 RX ADMIN — INSULIN LISPRO 12 UNITS: 100 INJECTION, SOLUTION INTRAVENOUS; SUBCUTANEOUS at 09:24

## 2018-06-12 RX ADMIN — Medication 10 ML: at 05:14

## 2018-06-12 RX ADMIN — SITAGLIPTIN 50 MG: 50 TABLET, FILM COATED ORAL at 11:54

## 2018-06-12 RX ADMIN — MIDODRINE HYDROCHLORIDE 2.5 MG: 2.5 TABLET ORAL at 11:54

## 2018-06-12 RX ADMIN — POTASSIUM & SODIUM PHOSPHATES POWDER PACK 280-160-250 MG 1 PACKET: 280-160-250 PACK at 09:23

## 2018-06-12 RX ADMIN — Medication 10 ML: at 13:39

## 2018-06-12 RX ADMIN — FOLIC ACID 1 MG: 1 TABLET ORAL at 09:23

## 2018-06-12 RX ADMIN — FUROSEMIDE 20 MG: 20 TABLET ORAL at 09:24

## 2018-06-12 RX ADMIN — GLIPIZIDE 10 MG: 10 TABLET ORAL at 09:23

## 2018-06-12 RX ADMIN — MIDODRINE HYDROCHLORIDE 2.5 MG: 2.5 TABLET ORAL at 17:49

## 2018-06-12 RX ADMIN — PANTOPRAZOLE SODIUM 40 MG: 40 TABLET, DELAYED RELEASE ORAL at 09:23

## 2018-06-12 RX ADMIN — INSULIN LISPRO 9 UNITS: 100 INJECTION, SOLUTION INTRAVENOUS; SUBCUTANEOUS at 11:54

## 2018-06-12 RX ADMIN — RIFAXIMIN 550 MG: 550 TABLET ORAL at 17:49

## 2018-06-12 RX ADMIN — Medication 10 ML: at 21:42

## 2018-06-12 RX ADMIN — Medication 100 MG: at 09:23

## 2018-06-12 NOTE — PROGRESS NOTES
Called patient's room and spoke with patient's sister, Betina Boyce ([phone]), received verbal FOC for [employer] [facility] ([address] ([phone]), sent referral in [place]. Informed Rita (CM) referral has been sent.

## 2018-06-12 NOTE — PROGRESS NOTES
NUTRITION    Nutrition Consult: General Nutrition Management & Supplements     RECOMMENDATIONS / PLAN:     - Add no concentrated sweets to diet order.  - Add daily multivitamin.  - Clarify supplement flavor preference. - Continue RD inpatient monitoring and evaluation. NUTRITION INTERVENTIONS & DIAGNOSIS:     [x] Meals/snacks: modified composition  [x] Medical food supplement therapy: Glucerna Shake TID. [x] Collaboration and referral of nutrition care: Discussed fluid restriction and no concentrated sweets restriction with Dr. Craig Oates     Nutrition Diagnosis: Inadequate oral intake related to decreased appetite as evidenced by pt consuming 50% or less of recent meals. Altered nutrition related laboratory values related to DM as evidenced by with with elevated BG levels and HbA1c of 7.2%. ASSESSMENT:     6/12: Pt reports appetite is slowly improving. Meal intake 25%-40%. Consuming 100% of supplements. Tolerating diet. Hyponatremia remains due to liver cirrhosis and beer potomania per MD note. Per discussion with Dr. Craig Oates, 800 mL fluid restriction for water only, and 1.5-2 L fluid restriction for all fluids. BG levels elevated, plan to add no concentrates sweets. 6/6: Pt with poor appetite and poor meal intake. S/p paracentesis yesterday, removed 1 L. Lactulose started, pt now with melena. Fluid restriction for hyponatremia, history of alcohol abuse.      Average po intake adequate to meet patients estimated nutritional needs:   [] Yes     [x] No   [] Unable to determine at this time    Diet: DIET NUTRITIONAL SUPPLEMENTS All Meals; [address] CARB Regular; 2 GM NA (House Low NA); FR 800ML      Food Allergies: NKFA  Current Appetite:   [] Good     [x] Fair     [] Poor     [] Other:  Appetite/meal intake prior to admission:   [] Good     [] Fair     [] Poor     [x] Other: 3 meals/day  Feeding Limitations:  [] Swallowing difficulty    [] Chewing difficulty    [] Other:  Current Meal Intake:   Patient Vitals for the past 100 hrs:   % Diet Eaten   06/11/18 1700 40 %   06/11/18 1230 30 %   06/11/18 0834 25 %   06/10/18 1730 25 %   06/10/18 1230 25 %   06/10/18 0805 30 %   06/09/18 1800 50 %     BM: 6/11, loose    Skin Integrity: abrasions to bilateral arms and knee   Edema: ascites; non-pitting genital, 1+ pitting LEs  Pertinent Medications: Reviewed: folic acid, glipizide, lantus, SSI, thiamine, lactulose, zofran    Recent Labs      06/12/18   0340  06/11/18   0410  06/10/18   1130  06/10/18   0200   NA   --   127*  126*   --    K   --   4.4  4.2   --    CL   --   90*  89*   --    CO2   --   32  31   --    GLU   --   173*  237*  213*   BUN   --   13  12   --    CREA   --   0.96  0.96   --    CA   --   8.3*  8.6   --    PHOS  2.6  2.3*   --   1.9*       Intake/Output Summary (Last 24 hours) at 06/12/18 1106  Last data filed at 06/12/18 0513   Gross per 24 hour   Intake              240 ml   Output              650 ml   Net             -410 ml       Anthropometrics:  Ht Readings from Last 1 Encounters:   06/09/18 5' 11\" (1.803 m)     Last 3 Recorded Weights in this Encounter    06/10/18 0332 06/11/18 0322 06/12/18 0400   Weight: 87.2 kg (192 lb 3.2 oz) 87.6 kg (193 lb 1.6 oz) 87.8 kg (193 lb 8 oz)     Body mass index is 26.99 kg/(m^2).           Weight History:   Weight Metrics 6/12/2018 6/4/2018   Weight 193 lb 8 oz -   BMI - 26.99 kg/m2        Admitting Diagnosis: symptomatic ascites  DKA (diabetic ketoacidoses) (HCC)  dx  Pertinent PMHx: alcohol abuse, liver cirrhosis, ascites, hemochromatosis, seizures     Education Needs:        [x] None identified  [] Identified - Not appropriate at this time  []  Identified and addressed - refer to education log  Learning Limitations:   [x] None identified  [] Identified    Cultural, Rastafari & ethnic food preferences:  [x] None identified    [] Identified and addressed     ESTIMATED NUTRITION NEEDS:     Calories: 0120-3483 kcal (MSJx1.2-1.3) based on [x] Actual BW 89 kg     [] IBW   Protein:  gm (0.8-1.2 gm/kg) based on  [x] Actual BW      [] IBW   Fluid: 1 mL/kcal     MONITORING & EVALUATION:     Nutrition Goal(s):   1. Po intake of meals will meet >75% of patient estimated nutritional needs within the next 7 days.   Outcome:  [] Met/Ongoing    [x]  Not Met/Progressing    [] New/Initial Goal     Monitoring:   [x] Food and beverage intake   [x] Diet order   [x] Nutrition-focused physical findings   [x] Treatment/therapy   [] Weight   [] Enteral nutrition intake        Previous Recommendations (for follow-up assessments only):     [x]   Implemented       []   Not Implemented (RD to address)      [] No Longer Appropriate     [] No Recommendation Made     Discharge Planning: diabetic diet   [x] Participated in care planning, discharge planning, & interdisciplinary rounds as appropriate      Jonny Reyes RD  Pager: [phone]

## 2018-06-12 NOTE — PROGRESS NOTES
Problem: Self Care Deficits Care Plan (Adult)  Goal: *Acute Goals and Plan of Care (Insert Text)  Occupational Therapy Goals  Initiated 6/10/2018 within 7 day(s). 1.  Patient will perform lower body dressing with modified independence   2. Patient will perform toileting with modified independence. 3.  Patient will perform toilet transfer with modified independence. 4.  Patient will participate in upper extremity therapeutic exercise/activities with supervision/set-up for 8 minutes. 5.  Patient will utilize energy conservation techniques during functional activities with verbal cues. Outcome: Progressing Towards Goal  Occupational Therapy TREATMENT    Patient: Lynnette Ariza (56 y.o. male)  Date: 6/12/2018  Diagnosis: symptomatic ascites  DKA (diabetic ketoacidoses) (HCC)  dx Hyponatremia  Procedure(s) (LRB):  ENDOSCOPY WITH POSSIBLE with gold probe (N/A) 5 Days Post-Op  Precautions: Fall  Chart, occupational therapy assessment, plan of care, and goals were reviewed. ASSESSMENT:  Pt required some encouragement to participate in OT. Pt agreed to maneuver to the BR for ADLs. Pt required SBA to maneuver to the BR w/FWW, performed functional txfr to the toilet and toileting hygiene w/SBA for safety and mult VCs for use of grab bars. Following toileting pt performed ADL grooming task std sinkside w/VCs for safe sequencing 2/2 pt's impulsivity. Pt maneuvered to the chair, required Set-up for LB dressing. Pt was educated on BUE TherEx in mult planes to perform to improve endurance and overall activity tolerance for carryover w/ADLs. Pt verbalized and demonstrated understanding. Progression toward goals:  [x]          Improving appropriately and progressing toward goals  []          Improving slowly and progressing toward goals  []          Not making progress toward goals and plan of care will be adjusted     PLAN:  Patient continues to benefit from skilled intervention to address the above impairments.   Continue treatment per established plan of care. Discharge Recommendations:  Home Health w/Supervision vs [place]  Further Equipment Recommendations for Discharge:  Shower chair      G-CODES:     Self Care  Current  CI= 1-19%   Goal  CI= 1-19%. The severity rating is based on the Level of Assistance required for Functional Mobility and ADLs. SUBJECTIVE:   Patient stated I am tired.     OBJECTIVE DATA SUMMARY:   Cognitive/Behavioral Status:  Neurologic State: Alert  Orientation Level: Oriented X4  Cognition: Follows commands       Functional Mobility and Transfers for ADLs:   Bed Mobility:     Supine to Sit: Supervision     Scooting: Supervision   Transfers:  Sit to Stand: Stand-by assistance    Toilet Transfer : Stand-by assistance (w/FWW)    Balance:  Sitting: Intact  Standing: Impaired; With support  Standing - Static: Fair  Standing - Dynamic : Fair    ADL Intervention:  Grooming  Grooming Assistance: Stand-by assistance (std sinkside)  Washing Face: Stand-by assistance  Washing Hands: Stand-by assistance    Lower Body Dressing Assistance  Socks: Supervision/set-up    Toileting  Bladder Hygiene: Supervision/set-up  Bowel Hygiene: Supervision/set-up  Therapeutic Exercises:   See above    Pain:  Pt reports 0/10 pain or discomfort prior to treatment.    Pt reports 0/10 pain or discomfort post treatment. Activity Tolerance:    Fair    Please refer to the flowsheet for vital signs taken during this treatment.   After treatment:   [x]  Patient left in no apparent distress sitting up in chair  []  Patient left in no apparent distress in bed  [x]  Call bell left within reach  [x]  Nursing notified  [x]  Family present  []  Bed alarm activated    ANA Vanegas  Time Calculation: 24 mins

## 2018-06-12 NOTE — PROGRESS NOTES
Called [facility] and spoke with Rafael Ramirez (ext. [phone]) informed her the referral was sent on [address], she stated she will review the information and call back. She also asked if the Rita (MACKENZIE) can give her a call, asked Jimy Crawford to give Rafael Ramirez a call.

## 2018-06-12 NOTE — PROGRESS NOTES
[facility] Hospitalist Group  Progress Note    Patient: Sandoval Dolan Age: 62 y.o. : 1960 MR#: [id number] SSN: [id number]  Date/Time: 2018     Subjective:     Feeling better. Sitting up in chair. Some lower abdominal discomfort while in bed but goes away once he starts getting out of bed. No chest pain or SOB or cough. Had a BM. No nausea or vomiting. Family is at bedside. Assessment:     1. Cirrhosis of Liver due to # 2  2. Chronic alcohol use  3. Generalized weakness, improving  4. Anemia of chronic illness, stable   5. Hyponatremia in setting of chronic hyponatremia due to liver cirrhosis and beer potomania  6. DM with A1c of 7.2  7. Thrombocytopenia secondary to Liver failure   8. Elevated bilirubin/ jaundice, thrombocytopenia, coagulopathy in setting of ETOH cirrhosis. 9. H/o hemochromatosis. 10. Ascites s/p paracentesis  11. Hx osteomyelitis, right 3rd toe  12. Hypothyroidism   13. Asymptomatic hypotension     PLAN     Cont current management  D/c lasix, reduce aldactone to avoid hypotension   Discharge plan - can be discharged to SNF once it is found close to his house.      Case discussed with:  [x]Patient  [x]Family  [x]Nursing  [x]Case Management  DVT Prophylaxis:  []Lovenox  []Hep SQ  [x]SCDs  []Coumadin   []On Heparin gtt      Objective:     VS:   Visit Vitals    BP 98/63 (BP 1 Location: Right arm, BP Patient Position: At rest)    Pulse 100    Temp 98.7 °F (37.1 °C)    Resp 18    Ht 5' 11\" (1.803 m)    Wt 87.8 kg (193 lb 8 oz)    SpO2 95%    BMI 26.99 kg/m2      Tmax/24hrs: Temp (24hrs), Av.6 °F (36.4 °C), Min:97 °F (36.1 °C), Max:98.7 °F (37.1 °C)  IOBRIEF    Intake/Output Summary (Last 24 hours) at 18 1102  Last data filed at 18 0513   Gross per 24 hour   Intake              240 ml   Output              650 ml   Net             -410 ml       General:  Alert, cooperative, no acute distress    Pulmonary:  Bilateral air entry present . No Wheezes  Cardiovascular: Regular rate and Rhythm. GI:  soft , non tender, BS +  Extremities:  Non pitting pedal edema. Neurologic: Grossly - Motor and Sensory functions are intact . Labs:    No results for input(s): WBC, HGB, HCT, PLT, HGBEXT, HCTEXT, PLTEXT, HGBEXT, HCTEXT, PLTEXT in the last 72 hours.   Recent Labs      06/12/18   0340  06/11/18   0410  06/10/18   1130  06/10/18   0200   NA   --   127*  126*   --    K   --   4.4  4.2   --    CL   --   90*  89*   --    CO2   --   32  31   --    GLU   --   173*  237*  213*   BUN   --   13  12   --    CREA   --   0.96  0.96   --    CA   --   8.3*  8.6   --    PHOS  2.6  2.3*   --   1.9*   INR   --   2.2*   --   2.3*         Signed By: Priscila Terry MD     June 12, 2018

## 2018-06-12 NOTE — ROUTINE PROCESS
Bedside and Verbal shift change report given to Jarrod Doss RN   (oncoming nurse) by Koby Cunningham RN (offgoing nurse). Report included the following information SBAR, Kardex, Intake/Output, MAR and Recent Results. Bedside and Verbal shift change report given to Albino Owen RN (oncoming nurse) by Jarrod Doss RN   (offgoing nurse). Report included the following information SBAR, Kardex, Intake/Output, MAR and Recent Results.

## 2018-06-13 VITALS
BODY MASS INDEX: 28.31 KG/M2 | OXYGEN SATURATION: 93 % | HEART RATE: 97 BPM | RESPIRATION RATE: 20 BRPM | SYSTOLIC BLOOD PRESSURE: 97 MMHG | DIASTOLIC BLOOD PRESSURE: 65 MMHG | WEIGHT: 202.2 LBS | HEIGHT: 71 IN | TEMPERATURE: 97.8 F

## 2018-06-13 LAB
ALBUMIN SERPL-MCNC: 2.1 G/DL (ref 3.4–5)
ALBUMIN/GLOB SERPL: 0.7 {RATIO} (ref 0.8–1.7)
ALP SERPL-CCNC: 157 U/L (ref 45–117)
ALT SERPL-CCNC: 57 U/L (ref 16–61)
ANION GAP SERPL CALC-SCNC: 7 MMOL/L (ref 3–18)
AST SERPL-CCNC: 93 U/L (ref 15–37)
BILIRUB SERPL-MCNC: 6.9 MG/DL (ref 0.2–1)
BUN SERPL-MCNC: 13 MG/DL (ref 7–18)
BUN/CREAT SERPL: 14 (ref 12–20)
CALCIUM SERPL-MCNC: 8.5 MG/DL (ref 8.5–10.1)
CHLORIDE SERPL-SCNC: 90 MMOL/L (ref 100–108)
CO2 SERPL-SCNC: 31 MMOL/L (ref 21–32)
CREAT SERPL-MCNC: 0.93 MG/DL (ref 0.6–1.3)
ERYTHROCYTE [DISTWIDTH] IN BLOOD BY AUTOMATED COUNT: 17.4 % (ref 11.6–14.5)
GLOBULIN SER CALC-MCNC: 3.2 G/DL (ref 2–4)
GLUCOSE BLD STRIP.AUTO-MCNC: 179 MG/DL (ref 70–110)
GLUCOSE BLD STRIP.AUTO-MCNC: 256 MG/DL (ref 70–110)
GLUCOSE SERPL-MCNC: 169 MG/DL (ref 74–99)
HCT VFR BLD AUTO: 24.9 % (ref 36–48)
HCT VFR BLD AUTO: 27.1 % (ref 36–48)
HGB BLD-MCNC: 8.8 G/DL (ref 13–16)
HGB BLD-MCNC: 9.5 G/DL (ref 13–16)
MCH RBC QN AUTO: 35.8 PG (ref 24–34)
MCHC RBC AUTO-ENTMCNC: 35.3 G/DL (ref 31–37)
MCV RBC AUTO: 101.2 FL (ref 74–97)
PHOSPHATE SERPL-MCNC: 2.7 MG/DL (ref 2.5–4.9)
PLATELET # BLD AUTO: 64 K/UL (ref 135–420)
PMV BLD AUTO: 11.8 FL (ref 9.2–11.8)
POTASSIUM SERPL-SCNC: 4.4 MMOL/L (ref 3.5–5.5)
PROT SERPL-MCNC: 5.3 G/DL (ref 6.4–8.2)
RBC # BLD AUTO: 2.46 M/UL (ref 4.7–5.5)
SODIUM SERPL-SCNC: 128 MMOL/L (ref 136–145)
WBC # BLD AUTO: 6.6 K/UL (ref 4.6–13.2)

## 2018-06-13 PROCEDURE — 84100 ASSAY OF PHOSPHORUS: CPT | Performed by: INTERNAL MEDICINE

## 2018-06-13 PROCEDURE — 74011250637 HC RX REV CODE- 250/637: Performed by: INTERNAL MEDICINE

## 2018-06-13 PROCEDURE — 97116 GAIT TRAINING THERAPY: CPT

## 2018-06-13 PROCEDURE — 80053 COMPREHEN METABOLIC PANEL: CPT | Performed by: INTERNAL MEDICINE

## 2018-06-13 PROCEDURE — 97530 THERAPEUTIC ACTIVITIES: CPT

## 2018-06-13 PROCEDURE — 36415 COLL VENOUS BLD VENIPUNCTURE: CPT | Performed by: INTERNAL MEDICINE

## 2018-06-13 PROCEDURE — 82962 GLUCOSE BLOOD TEST: CPT

## 2018-06-13 PROCEDURE — 74011636637 HC RX REV CODE- 636/637: Performed by: INTERNAL MEDICINE

## 2018-06-13 PROCEDURE — 85027 COMPLETE CBC AUTOMATED: CPT | Performed by: INTERNAL MEDICINE

## 2018-06-13 PROCEDURE — 85014 HEMATOCRIT: CPT | Performed by: INTERNAL MEDICINE

## 2018-06-13 PROCEDURE — 74011250637 HC RX REV CODE- 250/637: Performed by: PHYSICIAN ASSISTANT

## 2018-06-13 PROCEDURE — 77010033678 HC OXYGEN DAILY

## 2018-06-13 RX ORDER — LANOLIN ALCOHOL/MO/W.PET/CERES
100 CREAM (GRAM) TOPICAL DAILY
Qty: 30 TAB | Refills: 0 | Status: SHIPPED | OUTPATIENT
Start: 2018-06-14

## 2018-06-13 RX ORDER — PANTOPRAZOLE SODIUM 40 MG/1
40 TABLET, DELAYED RELEASE ORAL
Qty: 60 TAB | Refills: 0 | Status: SHIPPED | OUTPATIENT
Start: 2018-06-13

## 2018-06-13 RX ORDER — SPIRONOLACTONE 25 MG/1
12.5 TABLET ORAL DAILY
Qty: 15 TAB | Refills: 0 | Status: SHIPPED | OUTPATIENT
Start: 2018-06-14

## 2018-06-13 RX ORDER — THERA TABS 400 MCG
1 TAB ORAL DAILY
Qty: 30 TAB | Refills: 0 | Status: SHIPPED | OUTPATIENT
Start: 2018-06-14

## 2018-06-13 RX ORDER — GLIPIZIDE 10 MG/1
10 TABLET ORAL
Qty: 60 TAB | Refills: 0 | Status: SHIPPED | OUTPATIENT
Start: 2018-06-13

## 2018-06-13 RX ORDER — MIDODRINE HYDROCHLORIDE 5 MG/1
5 TABLET ORAL 2 TIMES DAILY WITH MEALS
Qty: 60 TAB | Refills: 0 | Status: SHIPPED | OUTPATIENT
Start: 2018-06-13 | End: 2018-07-13

## 2018-06-13 RX ORDER — FOLIC ACID 1 MG/1
1 TABLET ORAL DAILY
Qty: 30 TAB | Refills: 0 | Status: SHIPPED | OUTPATIENT
Start: 2018-06-14

## 2018-06-13 RX ORDER — LEVOTHYROXINE SODIUM 25 UG/1
25 TABLET ORAL
Qty: 30 TAB | Refills: 0 | Status: SHIPPED | OUTPATIENT
Start: 2018-06-14

## 2018-06-13 RX ORDER — TRAMADOL HYDROCHLORIDE 50 MG/1
50 TABLET ORAL
Qty: 10 TAB | Refills: 0 | Status: SHIPPED | OUTPATIENT
Start: 2018-06-13

## 2018-06-13 RX ORDER — METFORMIN HYDROCHLORIDE 500 MG/1
500 TABLET ORAL 2 TIMES DAILY WITH MEALS
Qty: 60 TAB | Refills: 0 | Status: SHIPPED | OUTPATIENT
Start: 2018-06-13

## 2018-06-13 RX ADMIN — SPIRONOLACTONE 12.5 MG: 25 TABLET ORAL at 10:01

## 2018-06-13 RX ADMIN — Medication 100 MG: at 10:01

## 2018-06-13 RX ADMIN — FOLIC ACID 1 MG: 1 TABLET ORAL at 10:02

## 2018-06-13 RX ADMIN — RIFAXIMIN 550 MG: 550 TABLET ORAL at 10:00

## 2018-06-13 RX ADMIN — GLIPIZIDE 10 MG: 10 TABLET ORAL at 10:02

## 2018-06-13 RX ADMIN — MIDODRINE HYDROCHLORIDE 2.5 MG: 2.5 TABLET ORAL at 10:01

## 2018-06-13 RX ADMIN — INSULIN LISPRO 9 UNITS: 100 INJECTION, SOLUTION INTRAVENOUS; SUBCUTANEOUS at 12:58

## 2018-06-13 RX ADMIN — Medication 10 ML: at 06:49

## 2018-06-13 RX ADMIN — SITAGLIPTIN 50 MG: 50 TABLET, FILM COATED ORAL at 10:00

## 2018-06-13 RX ADMIN — LACTULOSE 20 G: 20 SOLUTION ORAL at 10:01

## 2018-06-13 RX ADMIN — LEVOTHYROXINE SODIUM 25 MCG: 25 TABLET ORAL at 10:01

## 2018-06-13 RX ADMIN — THERA TABS 1 TABLET: TAB at 10:02

## 2018-06-13 RX ADMIN — INSULIN LISPRO 3 UNITS: 100 INJECTION, SOLUTION INTRAVENOUS; SUBCUTANEOUS at 09:59

## 2018-06-13 RX ADMIN — PANTOPRAZOLE SODIUM 40 MG: 40 TABLET, DELAYED RELEASE ORAL at 10:02

## 2018-06-13 NOTE — ROUTINE PROCESS
Bedside and Verbal shift change report given to Norm Faust (oncoming nurse) by Gris Esposito RN (offgoing nurse). Report included the following information SBAR, Kardex, Intake/Output, MAR and Recent Results.

## 2018-06-13 NOTE — PROGRESS NOTES
Pt was referred to [facility]. This writer spoke with Connie who agreed to accept this pt. Pt's family will transport this pt home. Pt will be provided a walker prior to this pt's discharge.

## 2018-06-13 NOTE — PROGRESS NOTES
Problem: Falls - Risk of  Goal: *Absence of Falls  Document Ulysses Fall Risk and appropriate interventions in the flowsheet. Outcome: Progressing Towards Goal  Fall Risk Interventions:  Mobility Interventions: Bed/chair exit alarm, Patient to call before getting OOB, PT Consult for mobility concerns, Strengthening exercises (ROM-active/passive), Utilize walker, cane, or other assitive device    Mentation Interventions: Adequate sleep, hydration, pain control, Bed/chair exit alarm, Increase mobility, Update white board    Medication Interventions: Bed/chair exit alarm, Patient to call before getting OOB    Elimination Interventions: Call light in reach, Bed/chair exit alarm, Patient to call for help with toileting needs, Urinal in reach    History of Falls Interventions: Bed/chair exit alarm        Problem: Pressure Injury - Risk of  Goal: *Prevention of pressure injury  Document Santiago Scale and appropriate interventions in the flowsheet.    Outcome: Progressing Towards Goal  Pressure Injury Interventions:  Sensory Interventions: Assess changes in LOC, Avoid rigorous massage over bony prominences, Maintain/enhance activity level    Moisture Interventions: Absorbent underpads, Minimize layers, Moisture barrier    Activity Interventions: Increase time out of bed, Pressure redistribution bed/mattress(bed type), PT/OT evaluation    Mobility Interventions: HOB 30 degrees or less, Pressure redistribution bed/mattress (bed type), PT/OT evaluation    Nutrition Interventions: Document food/fluid/supplement intake, Offer support with meals,snacks and hydration    Friction and Shear Interventions: Apply protective barrier, creams and emollients, HOB 30 degrees or less

## 2018-06-13 NOTE — DISCHARGE SUMMARY
PATIENT DISCHARGE INSTRUCTIONS      PATIENT DISCHARGE INSTRUCTIONS    Leida Bella / [id number] : 1960    Admitted 2018 Discharged: 2018     Dictated # [id number]    · It is important that you take the medication exactly as they are prescribed. · Keep your medication in the bottles provided by the pharmacist and keep a list of the medication names, dosages, and times to be taken in your wallet. · Do not take other medications without consulting your doctor. What to do at Home    Recommended Diet: Cardiac/ADA Diet    Recommended Activity: PT/OT per Home Health    Current Discharge Medication List      START taking these medications    Details   folic acid (FOLVITE) 1 mg tablet Take 1 Tab by mouth daily. Qty: 30 Tab, Refills: 0      glipiZIDE (GLUCOTROL) 10 mg tablet Take 1 Tab by mouth Before breakfast and dinner. Qty: 60 Tab, Refills: 0      lactulose (CHRONULAC) 10 gram/15 mL solution Take 30 mL by mouth two (2) times a day. Indications: Hepatic Encephalopathy, hold it if he has more than 6 BMs a day  Qty: 2000 mL, Refills: 0      levothyroxine (SYNTHROID) 25 mcg tablet Take 1 Tab by mouth Daily (before breakfast). Qty: 30 Tab, Refills: 0      midodrine (PROAMITINE) 5 mg tablet Take 1 Tab by mouth two (2) times daily (with meals) for 30 days. Qty: 60 Tab, Refills: 0      pantoprazole (PROTONIX) 40 mg tablet Take 1 Tab by mouth Before breakfast and dinner. Qty: 60 Tab, Refills: 0      rifAXIMin (XIFAXAN) 550 mg tablet Take 1 Tab by mouth two (2) times a day. Indications: Hepatic Encephalopathy  Qty: 60 Tab, Refills: 0      SITagliptin (JANUVIA) 50 mg tablet Take 1 Tab by mouth daily. Qty: 30 Tab, Refills: 0      therapeutic multivitamin (THERAGRAN) tablet Take 1 Tab by mouth daily. Qty: 30 Tab, Refills: 0      traMADol (ULTRAM) 50 mg tablet Take 1 Tab by mouth two (2) times daily as needed. Max Daily Amount: 100 mg.  Indications: Pain  Qty: 10 Tab, Refills: 0    Associated Diagnoses: Ascites due to alcoholic hepatitis      thiamine (B-1) 100 mg tablet Take 1 Tab by mouth daily. Qty: 30 Tab, Refills: 0      spironolactone (ALDACTONE) 25 mg tablet Take 0.5 Tabs by mouth daily.   Qty: 15 Tab, Refills: 0               Signed By: Mu Restrepo MD     June 13, 2018

## 2018-06-13 NOTE — ROUTINE PROCESS
Bedside and Verbal shift change report given to Jerome Escamilla RN   (oncoming nurse) by Sabrina Darby RN (offgoing nurse). Report included the following information SBAR, Kardex, Intake/Output, MAR and Recent Results.

## 2018-06-13 NOTE — DISCHARGE INSTRUCTIONS
Diabetic Ketoacidosis (DKA): Care Instructions  Your Care Instructions  Diabetic ketoacidosis (DKA) happens when the body does not have enough insulin and can't get the sugar it needs for energy. When the body can't use sugar for energy, it starts to use fat for energy. This process makes fatty acids called ketones. The ketones build up in the blood and change the chemical balance in your body. This problem can be very dangerous and needs to be treated. Without treatment, it can lead to a coma or death. DKA occurs most often in people with type 1 diabetes. But people with type 2 diabetes also can get it. DKA can be caused by many things. It can happen if you don't take enough insulin. It can also happen if you have an infection or illness like the flu. Sometimes it happens if you are very dehydrated. DKA can only be treated with insulin and fluids. These are often given in a vein (IV). Follow-up care is a key part of your treatment and safety. Be sure to make and go to all appointments, and call your doctor if you are having problems. It's also a good idea to know your test results and keep a list of the medicines you take. How can you care for yourself at home? To reduce your chance of ketoacidosis:  Take your insulin and other diabetes medicines on time and in the right dose. If an infection caused your DKA and your doctor prescribed antibiotics, take them as directed. Do not stop taking them just because you feel better. You need to take the full course of antibiotics. Test your blood sugar before meals and at bedtime or as often as your doctor advises. This is the best way to know when your blood sugar is high so you can treat it early. Watching for symptoms is not as helpful. This is because you may not have symptoms until your blood sugar is very high. Or you may not notice them. Teach others at work and at home how to check your blood sugar.  Make sure that someone else knows how do it in case you can't. Wear or carry medical identification at all times. This is very important in case you are too sick or injured to speak for yourself. Talk to your doctor about when you can start to exercise again. Eat regular meals that spread your calories and carbohydrate throughout the day. This will help keep your blood sugar steady. When you are sick:  Take your insulin and diabetes medicines. This is important even if you are vomiting and having trouble eating or drinking. Your blood sugar may go up because you are sick. If you are eating less than normal, you may need to change your dose of insulin. Talk with your doctor about a plan when you are well. Then you will know what to do when you are sick. Drink extra fluids to prevent dehydration. These include water, broth, and sugar-free drinks. If you don't drink enough, the insulin from your shot may not get into your blood. So your blood sugar may go up. Try to eat as you normally do, with a focus on healthy food choices. Check your blood sugar at least every 3 to 4 hours. Check it more often if it's rising fast. If your doctor has told you to take an extra insulin dose for high blood sugar levels (for example, above 240 mg/dL) be sure to take the right amount. If you're not sure how much to take, call your doctor. Check your temperature and pulse often. If your temperature goes up, call your doctor. You may be getting worse. If you take insulin, check your urine or blood for ketones, especially when you have high blood sugar (for example, above 240 mg/dL). Call your doctor if your ketone level is moderate or high. If you know your blood sugar is high, treat it before it gets worse. If you missed your usual dose of insulin or other diabetes medicine, take the missed dose or take the amount your doctor told you to take if this happens. If you and your doctor decide on a dose of extra-fast-acting insulin, give yourself the right dose.  If you take insulin and your doctor has not told you how much fast-acting insulin to take based on your blood sugar level, call your doctor. Drink extra water or sugar-free drinks to prevent dehydration. Wait 30 minutes after you take extra insulin or missed medicines. Then check your blood sugar again. If symptoms of high blood sugar get worse or your blood sugar level keeps rising, call your doctor. If you start to feel sleepy or confused, call 911. When should you call for help? Call 911 anytime you think you may need emergency care. For example, call if:  You passed out (lost consciousness). You are confused or cannot think clearly. Your blood sugar is very high or very low. Watch closely for changes in your health, and be sure to contact your doctor if:  Your blood sugar stays outside the level your doctor set for you. You have any problems. Where can you learn more? Go to http://carissaPllop.itthierry.info/. Karina Cheatham in the search box to learn more about \"Diabetic Ketoacidosis (DKA): Care Instructions. \"  Current as of: March 13, 2017  Content Version: 11.4  © 3822-4541 [employer]. Care instructions adapted under license by [employer] (which disclaims liability or warranty for this information). If you have questions about a medical condition or this instruction, always ask your healthcare professional. [place] [address] any warranty or liability for your use of this information. Gastrointestinal Bleeding: Care Instructions  Your Care Instructions    The digestive or gastrointestinal tract goes from the mouth to the anus. It is often called the GI tract. Bleeding can happen anywhere in the GI tract. It may be caused by an ulcer, an infection, or cancer. It may also be caused by medicines such as aspirin or ibuprofen. Light bleeding may not cause any symptoms at first. But if you continue to bleed for a while, you may feel very weak or tired.   Sudden, heavy bleeding means you need to see a doctor right away. This kind of bleeding can be very dangerous. But it can usually be cured or controlled. The doctor may do some tests to find the cause of your bleeding. Follow-up care is a key part of your treatment and safety. Be sure to make and go to all appointments, and call your doctor if you are having problems. It's also a good idea to know your test results and keep a list of the medicines you take. How can you care for yourself at home? · Be safe with medicines. Take your medicines exactly as prescribed. Call your doctor if you think you are having a problem with your medicine. You will get more details on the specific medicines your doctor prescribes. · Do not take aspirin or other anti-inflammatory medicines, such as naproxen (Aleve) or ibuprofen (Advil, Motrin), without talking to your doctor first. Ask your doctor if it is okay to use acetaminophen (Tylenol). · Do not drink alcohol. · The bleeding may make you lose iron. So it's important to eat foods that have a lot of iron. These include red meat, shellfish, poultry, and eggs. They also include beans, raisins, whole-grain breads, and leafy green vegetables. If you want help planning meals, you can make an appointment with a dietitian. When should you call for help? Call 911 anytime you think you may need emergency care. For example, call if:  ? · You have sudden, severe belly pain. ? · You vomit blood or what looks like coffee grounds. ? · You passed out (lost consciousness). ? · Your stools are maroon or very bloody. ?Call your doctor now or seek immediate medical care if:  ? · You are dizzy or lightheaded, or you feel like you may faint. ? · Your stools are black and look like tar, or they have streaks of blood. ? · You have belly pain. ? · You vomit or have nausea. ? · You have trouble swallowing, or it hurts when you swallow. ? Watch closely for changes in your health, and be sure to contact your doctor if:  ? · You do not get better as expected. Where can you learn more? Go to http://carissa-thierry.info/. Enter P698 in the search box to learn more about \"Gastrointestinal Bleeding: Care Instructions. \"  Current as of: March 20, 2017  Content Version: 11.4  © 5999-5118 [employer]. Care instructions adapted under license by [employer] (which disclaims liability or warranty for this information). If you have questions about a medical condition or this instruction, always ask your healthcare professional. [address] any warranty or liability for your use of this information.

## 2018-06-13 NOTE — PROGRESS NOTES
[facility] Hospitalist Group  Progress Note    Patient: Darian Gavin Age: 62 y.o. : 1960 MR#: [id number] SSN: [id number]  Date/Time: 2018     Subjective:     Feeling better. Wants to go home. No chest and abdominal pain. No nausea or vomiting. Family is at bedside. Assessment:     1. Cirrhosis of Liver due to # 2  2. Chronic alcohol use  3. Generalized weakness, improving  4. Anemia of chronic illness, stable   5. Hyponatremia in setting of chronic hyponatremia due to liver cirrhosis and beer potomania  6. DM with A1c of 7.2  7. Thrombocytopenia secondary to Liver failure   8. Elevated bilirubin/ jaundice, thrombocytopenia, coagulopathy in setting of ETOH cirrhosis. 9. H/o hemochromatosis. 10. Ascites s/p paracentesis  11. Hx osteomyelitis, right 3rd toe  12. Hypothyroidism   13. Asymptomatic hypotension     PLAN     Cont current management  Dis[place] [id number] today     Case discussed with:  [x]Patient  [x]Family  [x]Nursing  [x]Case Management  DVT Prophylaxis:  []Lovenox  []Hep SQ  [x]SCDs  []Coumadin   []On Heparin gtt      Objective:     VS:   Visit Vitals    BP 97/65 (BP 1 Location: Right arm, BP Patient Position: At rest)    Pulse 97    Temp 97.8 °F (36.6 °C)    Resp 20    Ht 5' 11\" (1.803 m)    Wt 91.7 kg (202 lb 3.2 oz)    SpO2 93%    BMI 28.2 kg/m2      Tmax/24hrs: Temp (24hrs), Av.8 °F (36.6 °C), Min:97.1 °F (36.2 °C), Max:98.2 °F (36.8 °C)  IOBRIEF    Intake/Output Summary (Last 24 hours) at 18 1139  Last data filed at 18 0043   Gross per 24 hour   Intake              410 ml   Output              500 ml   Net              -90 ml       General:  Alert, cooperative, no acute distress    Pulmonary:  Bilateral air entry present . No Wheezes  Cardiovascular: Regular rate and Rhythm. GI:  soft , non tender, BS +. Abdominal wall edema present. Extremities:  Non pitting pedal edema.   Neurologic: Grossly - Motor and Sensory functions are intact . Labs:    Recent Labs      06/13/18   0530   WBC  6.6   HGB  8.8*   HCT  24.9*   PLT  64*     Recent Labs      06/13/18   0530  06/12/18   0340  06/11/18   0410   NA  128*   --   127*   K  4.4   --   4.4   CL  90*   --   90*   CO2  31   --   32   GLU  169*   --   173*   BUN  13   --   13   CREA  0.93   --   0.96   CA  8.5   --   8.3*   PHOS  2.7  2.6  2.3*   ALB  2.1*   --    --    SGOT  93*   --    --    ALT  57   --    --    INR   --    --   2.2*     Current Discharge Medication List      START taking these medications    Details   folic acid (FOLVITE) 1 mg tablet Take 1 Tab by mouth daily. Qty: 30 Tab, Refills: 0      glipiZIDE (GLUCOTROL) 10 mg tablet Take 1 Tab by mouth Before breakfast and dinner. Qty: 60 Tab, Refills: 0      lactulose (CHRONULAC) 10 gram/15 mL solution Take 30 mL by mouth two (2) times a day. Indications: Hepatic Encephalopathy, hold it if he has more than 6 BMs a day  Qty: 2000 mL, Refills: 0      levothyroxine (SYNTHROID) 25 mcg tablet Take 1 Tab by mouth Daily (before breakfast). Qty: 30 Tab, Refills: 0      midodrine (PROAMITINE) 5 mg tablet Take 1 Tab by mouth two (2) times daily (with meals) for 30 days. Qty: 60 Tab, Refills: 0      pantoprazole (PROTONIX) 40 mg tablet Take 1 Tab by mouth Before breakfast and dinner. Qty: 60 Tab, Refills: 0      rifAXIMin (XIFAXAN) 550 mg tablet Take 1 Tab by mouth two (2) times a day. Indications: Hepatic Encephalopathy  Qty: 60 Tab, Refills: 0      SITagliptin (JANUVIA) 50 mg tablet Take 1 Tab by mouth daily. Qty: 30 Tab, Refills: 0      therapeutic multivitamin (THERAGRAN) tablet Take 1 Tab by mouth daily. Qty: 30 Tab, Refills: 0      traMADol (ULTRAM) 50 mg tablet Take 1 Tab by mouth two (2) times daily as needed. Max Daily Amount: 100 mg. Indications: Pain  Qty: 10 Tab, Refills: 0    Associated Diagnoses: Ascites due to alcoholic hepatitis      thiamine (B-1) 100 mg tablet Take 1 Tab by mouth daily.   Qty: 30 Tab, Refills: 0      spironolactone (ALDACTONE) 25 mg tablet Take 0.5 Tabs by mouth daily. Qty: 15 Tab, Refills: 0      metFORMIN (GLUCOPHAGE) 500 mg tablet Take 1 Tab by mouth two (2) times daily (with meals).   Qty: 60 Tab, Refills: 0             Signed By: Carol Ann Diane MD     June 13, 2018

## 2018-06-13 NOTE — PROGRESS NOTES
Problem: Mobility Impaired (Adult and Pediatric)  Goal: *Acute Goals and Plan of Care (Insert Text)  Physical Therapy Goals  Initiated 6/8/2018 and to be accomplished within 7 day(s)  1. Patient will move from supine to sit and sit to supine , scoot up and down and roll side to side in bed with modified independence. 2.  Patient will transfer from bed to chair and chair to bed with modified independence using the least restrictive device. 3.  Patient will perform sit to stand with modified independence. 4.  Patient will ambulate with supervision/set-up for >150 feet with the least restrictive device. 5.  Patient will ascend/descend 7 stairs with 1 handrail(s) with supervision/set-up. Outcome: Progressing Towards Goal  physical Therapy TREATMENT    Patient: Thom Ramirez (17 y.o. male)  Date: 6/13/2018  Diagnosis: symptomatic ascites  DKA (diabetic ketoacidoses) (HCC)  dx Hyponatremia  Procedure(s) (LRB):  ENDOSCOPY WITH POSSIBLE with gold probe (N/A) 6 Days Post-Op  Precautions: Fall   Chart, physical therapy assessment, plan of care and goals were reviewed. OBJECTIVE/ ASSESSMENT:  Patient found supine in bed with family present and willing to work with PT. Pt transferred from supine to sitting on EOB with supervision. Pt sat on EOB and performed seated therex (see below) prior to gait training. Pt stood from bed to RW with CGA for safety. Pt and caregivers educated on proper height adjustments for RW upon standing. Pt ambulated in room for 10ft x2. Pt demonstrated a shuffling gate pattern with a narrowed center of gravity during ambulation. Pt returned to supine in bed. Unable to practice stair training at this time due to contact precautions. Educated pt and caregivers on proper sequencing for stair training and safest position for caregivers to provide assistance on stairs once pt returns home. Pt stated he felt comfortable negotiating stairs with assistance from his brother in law.  Pt left supine in bed with all needs in reach and family present. Education: Gait training, sequencing for stair training, therex, bed mobility  Progression toward goals:  [x]      Improving appropriately and progressing toward goals  []      Improving slowly and progressing toward goals  []      Not making progress toward goals and plan of care will be adjusted     PLAN:  Patient continues to benefit from skilled intervention to address the above impairments. Continue treatment per established plan of care. Discharge Recommendations:  Rehab  Further Equipment Recommendations for Discharge: Rolling walker     SUBJECTIVE:   Patient stated I feel comfortable with it\" (when asked if he felt comfortable with negotiating stairs with assistance from brother in law    OBJECTIVE DATA SUMMARY:   Functional Mobility Training:  Supine to Sit: Supervision  Sit to Supine: Supervision  Scooting: Supervision  Transfers:  Sit to Stand: Contact guard assistance  Stand to Sit: Contact guard assistance  Balance:  Sitting: Intact  Sitting - Static: Fair (occasional); Supported sitting  Sitting - Dynamic: Fair (occasional)  Standing: Impaired; With support  Standing - Static: Fair  Standing - Dynamic : Fair  Ambulation/Gait Training:  Distance (ft): 20 Feet (ft)  Assistive Device: Walker, rolling  Ambulation - Level of Assistance: Contact guard assistance  Gait Abnormalities: Decreased step clearance  Base of Support: Narrowed; Center of gravity altered  Speed/Mai: Fluctuations; Shuffled  Step Length: Left shortened;Right shortened  Therapeutic Exercises:       EXERCISE   Sets   Reps   Active Active Assist   Passive Self- assited ROM   Comments   Ankle Pumps 1 10  [x] [] [] [] bilaterally   Quad Sets/Glut Sets   [] [] [] []    Seated Knee Flexion   [] [] [] []    Short Arc Quads   [] [] [] []    Heel Slides   [] [] [] []    Straight Leg Raises   [] [] [] []    Hip Abd/Add   [] [] [] []    Long Arc Quads 1 10 [x] [] [] [] bilaterally   Seated Marching   [] [] [] []    Standing Marching   [] [] [] []    Heel/toe touches 1 10 [x] [] [] [] bilaterally     Pain:  Pre tx pain: 0/10  Post tx pain: not rated  Activity Tolerance:   fair  Please refer to the flowsheet for vital signs taken during this treatment. After treatment:   [] Patient left in no apparent distress sitting up in chair  [x] Patient left in no apparent distress in bed  [x] Call bell left within reach  [x] Nursing notified  [] Brother in law and sister present  [] Bed alarm activated  [] SCDs applied  [] Ice applied      Lauren E D'Amico, PTA   Time Calculation: 24 mins    Mobility  Current  CI= 1-19%. The severity rating is based on the Level of Assistance required for Functional Mobility and ADLs. Mobility   Goal  CI= 1-19%. The severity rating is based on the Level of Assistance required for Functional Mobility and ADLs.

## 2018-06-13 NOTE — PROGRESS NOTES
[email or web address]  [phone]    Gastroenterology follow up-Progress note    Impression:  1. Decompensated alcoholic cirrhosis  2. Melena - hemorrhagic gastritis on EGD, no varices, single episodes of BRBPR this AM, H&H stable  3. Ascites s/p paracentesis, fluid negative, abdomen more enlarged, mild distention today  4. Hemochromatosis, no phlembotomy X 1 year, hct to low to treat   5. Anemia multi factorial   6. Hyponatremia aggravated by high blood sugar   7. Hepatic encephalopathy, acute, improved     Plan:  1. Continue present liver management   2. Follow up as outpatient within the next 2 weeks for liver management and outpatient colonoscopy. Chief Complaint: bright red rectal bleeding    Subjective:  Patient reports about 2 Tbsp bright red blood in stool this AM, denies abdominal pain, active bleeding, nausea, vomiting, diarrhea, and anemia is improved. Last colonoscopy over 3 years ago with polyps and diverticulosis per pt. No family history of colon cancer. Continues with mild abdominal distension and no confusion at this time. ROS: Denies any fevers, chills, rash.      Eyes: conjunctiva normal, EOM normal   Neck: ROM normal, supple and trachea normal   Cardiovascular: heart normal, intact distal pulses, normal rate and regular rhythm   Pulmonary/Chest Wall: breath sounds normal and effort normal   Abdominal: appearance normal, bowel sounds normal and soft, non-acute, non-tender     Patient Active Problem List   Diagnosis Code    Type 2 diabetes mellitus with hyperosmolarity (HCC) O40.53    Alcoholic cirrhosis (HCC) I22.42    Alcohol withdrawal syndrome, with delirium ([facility] [address] F10.231    Hyponatremia E87.1    Nausea R11.0    Other hemochromatosis E83.118    Weakness generalized R53.1    Ascites due to alcoholic hepatitis V09.10    Thrombocytopenia (HCC) D69.6         Visit Vitals    BP 97/65 (BP 1 Location: Right arm, BP Patient Position: At rest)    Pulse 97    Temp 97.8 °F (36.6 °C)    Resp 20    Ht 5' 11\" (1.803 m)    Wt 91.7 kg (202 lb 3.2 oz)    SpO2 93%    BMI 28.2 kg/m2           Intake/Output Summary (Last 24 hours) at 06/13/18 1331  Last data filed at 06/13/18 0043   Gross per 24 hour   Intake              260 ml   Output              400 ml   Net             -140 ml       CBC w/Diff    Lab Results   Component Value Date/Time    WBC 6.6 06/13/2018 05:30 AM    RBC 2.46 (L) 06/13/2018 05:30 AM    HGB 9.5 (L) 06/13/2018 12:20 PM    HCT 27.1 (L) 06/13/2018 12:20 PM    .2 (H) 06/13/2018 05:30 AM    MCH 35.8 (H) 06/13/2018 05:30 AM    MCHC 35.3 06/13/2018 05:30 AM    RDW 17.4 (H) 06/13/2018 05:30 AM    PLT 64 (L) 06/13/2018 05:30 AM    Lab Results   Component Value Date/Time    GRANS 63 06/07/2018 02:31 AM    LYMPH 22 06/07/2018 02:31 AM    EOS 0 06/07/2018 02:31 AM    BANDS 10 (H) 06/06/2018 04:20 AM    BASOS 0 06/07/2018 02:31 AM    MYELO 1 (H) 06/05/2018 01:30 AM      Basic Metabolic Profile   Recent Labs      06/13/18   0530   NA  128*   K  4.4   CL  90*   CO2  31   BUN  13   CA  8.5   PHOS  2.7        Hepatic Function    Lab Results   Component Value Date/Time    ALB 2.1 (L) 06/13/2018 05:30 AM    TP 5.3 (L) 06/13/2018 05:30 AM     (H) 06/13/2018 05:30 AM    Lab Results   Component Value Date/Time    SGOT 93 (H) 06/13/2018 05:30 AM          Coags   Recent Labs      06/11/18   0410   PTP  23.4*   INR  2.2*           Juan Francisco Holloway NP    [facility]. [email or web address]  Phone: [phone]  Pager: [phone]

## 2018-06-14 NOTE — DISCHARGE SUMMARY
[address] [place] [address] Dr    DISCHARGE SUMMARY    Venkatesh Giron  MR#: [id number]  : 1960  ACCOUNT #: [de-identified]   ADMIT DATE: 2018  DISCHARGE DATE: 2018    DISPOSITION:  Discharged to home with home health care for PT, OT, DME as needed, skilled nursing for disease medication management as well as personal aid. DISCHARGE CONDITION:  Stable. DISCHARGE DIAGNOSES:  1. Liver cirrhosis due to #2. 2.  Chronic alcohol use. 3.  Severe hyponatremia, much better, due to beer potomania and cirrhosis. 3.  Hypothyroidism, new diagnosis. 4.  Generalized weakness, improving. 5.  Anemia of chronic kidney disease, stable. 6.  History of hematochromatosis. 7.  Ascites, status post paracentesis. 8.  Recent right third toe osteomyelitis. 9.  Hyperbilirubinemia, thrombocytosis and coagulopathy due to alcoholic cirrhosis, stable. 10.  Diabetes mellitus with A1c of 7.2. 11.  Asymptomatic hypotension. DISCHARGE MEDICATIONS:  1. Folvite 1 mg daily. 2.  Glipizide 10 mg b.i.d.  3.  Metformin 500 mg b.i.d.  4.  Januvia 50 mg daily. 5.  Synthroid 25 mcg daily. 6.  Lactulose 30 mL twice a day. 7.  Protonix 40 mg b.i.d.  8.  Xifaxan 550 mg twice a day. 9.  Multivitamin 1 tablet daily. 10.  Tramadol 50 mg twice a day as needed. 11.  Thiamine 100 mg daily. 12.  Aldactone 12.5 mg daily. IMAGING AND PROCEDURES:  1. Ultrasound of the liver was done, reported increased echogenicity of the hepatic parenchyma suggestive of liver cirrhosis and portal hypertension. 2.  Ultrasound-guided paracentesis was done and 1 liter of fluid was taken out. 3.  Chest x-ray was done, showed moderate pulmonary hypoventilation. 4.  Pleural fluid was just showing some RBC and nucleated WBC. 5.  Endoscopy was done, showed multiple areas of hemorrhagic gastritis, especially in the atrium.     HOSPITAL COURSE:  The patient was admitted to the hospital on 2018 from [facility] with complaint of nausea and generalized weakness. Please refer to hospital admission H and P for further detail. The patient had altered mental status, hyponatremia, nausea, as well as elevated blood sugar. He was getting a diagnosis of hematochromatosis and was getting phlebotomy as an outpatient. As per family, he was taking lots of alcohol. He was diagnosed with hyponatremia with a sodium of 121; however, it remained stable and on the day of discharge, is 128. It looks like patient had acute on chronic hyponatremia due to beer potomania. Patient also had elevated bilirubin of around 7.6-8. His liver function test showed elevated AST, ALT, remained stable. His INR was initially 3.5, then got back to about 2.2. His hemoglobin remained stable, did not require any blood transfusion. He underwent endoscopy, which showed above-mentioned findings. GI was following this patient. The patient also underwent ultrasound-guided thoracentesis and fluid was just transudative. Blood culture x 2 remain negative. Patient was seen by PT, OT, who recommended initially skilled nursing facility, which was not covered by his payer source. He remained some few more days and started getting better. He was also found out to have new onset of hypothyroidism based on his TSH of 8.76 and started on Synthroid. GI also put him on a small dose of Aldactone; however, he was kept on midodrine because of his symptomatic hypotension. He was continued on lactulose and Xifaxan to prevent encephalopathy. He had an A1c of 7.2. Initially, he required IV insulin, then subcu insulin Lantus and then was changed to glipizide, Januvia and metformin. Patient was tolerating diet and medications very well. PT, OT recommended home health care versus skilled nursing facility. Patient wanted to go home. He will have home health care set up.   He had a small amount of blood in the stool, was seen by GI, did not recommend any further intervention as hemoglobin was going up. It looks like might have hemorrhoidal bleed. Patient was tolerating diet and medication very well. He will be discharged home with the above-mentioned medications. DISCHARGE INSTRUCTIONS:    1. Diet:  ADA cardiac diet. 2.  Activity:  PT, OT to follow him. 3.  Follow up with primary care physician or Urgent Care in [facility] in 5 days. 4.  Follow up with Dr. Pleasant Closs or GI group in [facility] in 4 weeks. 5.  Patient was advised to go to Urgent Care in the next 3 days to have another CBC, BMP done, which is discussed with the patient's sister. They verbalized understanding. Total time greater than 35 minutes.       MD WILY Buitrago/DARNELL  D: 06/13/2018 16:09     T: 06/13/2018 17:40  JOB #: [id number]  CC: Ponce Leigh MD

## 2018-06-19 NOTE — ADT AUTH CERT NOTES
Utilization Review           Gastroenterology [facility] - Care Day 5 (6/8/2018) by Bruce Campo RN        Review Status Review Entered       Completed 6/13/2018       Details              Care Day: 5 Care Date: 6/8/2018 Level of Care: Inpatient Floor       Guideline Day 1        Clinical Status       (X) * Clinical Indications met [C]       6/13/2018 1:13 PM EDT by Natacha Arzate         95/59 95 97.2 18 97% ra  dry flaky skin scleral icturus + fluid wave abd soft protruberant amp with dip sutures still in place  contact isol bp remains low 94/60 95/59 116/74 92/59  abd pain 8/10  inr 2.3                     Interventions       (X) Inpatient interventions as needed       6/13/2018 1:13 PM EDT by Natacha Arzate         gi and nephrology consulting iv toradol 15 mg given x1 folic acid 1 mg iv daily iv thiamine 200 mg daily                     6/13/2018 1:13 PM EDT by Natacha Arzate       Subject: Additional Clinical Information       gluc 212 241 262 238 6 units ssi given x1 ssi given 12 units x1 6 units x2 9 units x1 lactulose 20 g 2 x daily neutra phos packets 4 x daily per nephrology he had very high urine osmolarity very low derum sodium on arrival he had osmolar gap also on arrival his sodium gradually improving suspect his hyponatremia from heavy etoh use and poor dietary cont current management per attending cirrhosis with ascites d/t to alcohol and hemochromatosis s/p paracentesis no evidence of peritonitis at this time hep a/b/c panel neg renal function currently normal inr of 3.5-2.6 ammonia 47 h pylori pending free fluid restriction of 800 ml/day cont carafate rifaximin protonix lactulose thiamin folate supplements ssi awaiting pt ot evals to help determine placement follow cbc cmp will remove sutures from toe ultram prn pain 50 mg q6h mod pain given x1 per gi repeat lfts start spirolactone 50 mg once daily cont xioifaxin and lactulose consider repeat paracentesis w fluid analysis cont ppi and carafate if lfts and H&H stabe will be ok for dc and op f/u pending pt ot luis                                    * Milestone                  Gastroenterology [facility] - Clinical Indications for Admission to Inpatient Care by Maria L Bob RN        Review Status Review Entered       Completed 6/13/2018       Details              Clinical Indications for Admission to Inpatient Care       Most Recent : Ambrose Arias Most Recent Date: 6/13/2018 1:02 PM EDT       (X) Hospital admission is needed for appropriate care of the patient because of 1 or more of the        following :          (X) Gastroenterology condition, symptom, or finding for which emergency and observation care have           failed or are not considered appropriate.  See General Criteria: Observation Care, General           Admission Criteria, or Pediatric General Admission Criteria guideline as appropriate.          6/13/2018 1:02 PM EDT by Ambrose Arias            decompensated alcoholic cirrhosis ascites

## 2018-06-25 NOTE — ADT AUTH CERT NOTES
Utilization Review           Gastroenterology [facility] - Care Day 9 (6/12/2018) by Eric Jordan RN        Review Status Review Entered       Completed 6/22/2018       Details              Care Day: 9 Care Date: 6/12/2018 Level of Care: Inpatient Floor       Guideline Day 1        Clinical Status       (X) * Clinical Indications met [C]              Interventions       (X) Inpatient interventions as needed              6/22/2018 6:09 PM EDT by Chai Turner       Subject: Additional Clinical Information                ã         IM NOTE 6/12         due to # 2         2. ãChronic alcohol use         3. Generalized weakness, improving         4. ãAnemia of chronic illness, stableã         5. Hyponatremia in setting of chronic hyponatremia due to liver cirrhosis and beer potomania         6. ãDM with A1c of 7.2         Cont current management         Change IV meds to PO medications         Discharge plan - can be discharged to [facility] vs SNF tomorrow                  NO LABS/RESULTS                  98/63         98.7F         100HR         18RR         95%                  NO IV MEDS                                          * Milestone                  Gastroenterology [facility] - Care Day 8 (6/11/2018) by Eric Jordan RN        Review Status Review Entered       Completed 6/22/2018       Details              Care Day: 8 Care Date: 6/11/2018 Level of Care: Inpatient Floor       Guideline Day 1        Clinical Status       (X) * Clinical Indications met [C]              Interventions       (X) Inpatient interventions as needed              6/22/2018 6:00 PM EDT by Chai Turner       Subject: Additional Clinical Information       IM NOTE 6/11         Feeling better. ãNo chest or abdominal pain. ãFatigue improving. ãNo SOB or cough. ãWalked with me and PT in hallway and took some steps.  ãFamily is at bedside - feel uncomfortable to take him and wants him to go to SNF         Cirrhosis of Liver due to # 2         2.ãChronic alcohol use         3. Generalized weakness, improving         4. ãAnemia of chronic illness, stableã         5. Hyponatremia in setting of chronic hyponatremia due to liver cirrhosis and beer potomania         6. ãDM with A1c of 7.2         Cont current management         Change IV meds to PO medications         Discharge plan - can be discharged to [facility] vs SNF tomorrow                                    95/60         97.4F         95HR         16RR         95%RA         THIAMINE 200MG IV X 1         FOLIC ACID 1MG IV X 1                                                   * Milestone                  Gastroenterology [facility] - Care Day 7 (6/10/2018) by Julio Kruse RN        Review Status Review Entered       Completed 6/22/2018       Details              Care Day: 7 Care Date: 6/10/2018 Level of Care: Inpatient Floor       Guideline Day 1        Clinical Status       (X) * Clinical Indications met [C]              Interventions       (X) Inpatient interventions as needed              6/22/2018 5:57 PM EDT by Cuong Mejia       Subject: Additional Clinical Information       IM NOTE 6/10         Sitting in chair , appears coherant but according to patient's sister - patient is confused off and on .         Continues to be hyponatremic , asymptomatic         - Follow with renal         - For coagulopathy - Vit K         - Increase Glucotrol dose         - D/w patient sister and POA - Patient has in past decided to be DNR/DNI , and his wishes are still same                                    101/58         98.1F         89HR         20RR         98%RA                  FOLIC ACID 1MG IV X 1         THIAMINE 200MG IV X 1                                          * Milestone

## 2021-09-28 NOTE — PROGRESS NOTES
Pt info has been uploaded via [employer] to [facility] for placement. Called Ray Archer of [facility] and left a message. [phone]:  Called Dmitriy Ventura and she states [employer] did not approve for placement because pt's policy does not cover for SNF. Dr. Laurie Ascencio informed. Pt's family informed. Epidermal Sutures: 5-0 Chromic Gut
